# Patient Record
Sex: FEMALE | Race: WHITE | Employment: OTHER | URBAN - METROPOLITAN AREA
[De-identification: names, ages, dates, MRNs, and addresses within clinical notes are randomized per-mention and may not be internally consistent; named-entity substitution may affect disease eponyms.]

---

## 2017-01-12 RX ORDER — ALPRAZOLAM 0.5 MG/1
TABLET ORAL
Qty: 30 TABLET | Refills: 0 | Status: SHIPPED | OUTPATIENT
Start: 2017-01-12 | End: 2017-06-15

## 2017-01-12 NOTE — TELEPHONE ENCOUNTER
ALPRAZOLAM 0.5MG     The patient has an appt on 01/18/17.     Rx is pending for your approval.    Please print & sign, thank you

## 2017-01-18 ENCOUNTER — OFFICE VISIT (OUTPATIENT)
Dept: SURGERY | Facility: CLINIC | Age: 47
End: 2017-01-18

## 2017-01-18 VITALS
SYSTOLIC BLOOD PRESSURE: 98 MMHG | TEMPERATURE: 97 F | RESPIRATION RATE: 16 BRPM | WEIGHT: 122 LBS | DIASTOLIC BLOOD PRESSURE: 72 MMHG | HEART RATE: 64 BPM | BODY MASS INDEX: 20 KG/M2

## 2017-01-18 DIAGNOSIS — D05.11 DUCTAL CARCINOMA IN SITU (DCIS) OF RIGHT BREAST: Primary | ICD-10-CM

## 2017-01-18 DIAGNOSIS — D50.0 IRON DEFICIENCY ANEMIA DUE TO CHRONIC BLOOD LOSS: ICD-10-CM

## 2017-01-18 DIAGNOSIS — R53.83 FATIGUE, UNSPECIFIED TYPE: ICD-10-CM

## 2017-01-18 PROCEDURE — 99213 OFFICE O/P EST LOW 20 MIN: CPT | Performed by: SURGERY

## 2017-01-18 RX ORDER — FLUTICASONE FUROATE AND VILANTEROL TRIFENATATE 100; 25 UG/1; UG/1
POWDER RESPIRATORY (INHALATION)
Refills: 12 | COMMUNITY
Start: 2016-10-27 | End: 2017-01-18

## 2017-01-18 RX ORDER — ELECTROLYTES/DEXTROSE
SOLUTION, ORAL ORAL
COMMUNITY

## 2017-01-18 NOTE — PROGRESS NOTES
Follow Up Visit Note       Active Problems      1. Ductal carcinoma in situ (DCIS) of right breast    2. Iron deficiency anemia due to chronic blood loss    3.  Fatigue, unspecified type          Chief Complaint   Follow - Up    History of Present Illness The family history and social history have been reviewed by me today.     Family History   Problem Relation Age of Onset   • acute venous thrombosis of the deep vessels of the lower extremity[other] [OTHER] Mother    • Cancer Maternal Grandfather stool, abdominal distention and anal bleeding. Genitourinary: Negative for dysuria, urgency, frequency and difficulty urinating. Musculoskeletal: Negative for myalgias and arthralgias. Skin: Negative for color change and rash.    Neurological: Negativ to chronic blood loss  Fatigue, unspecified type    Plan   · Regarding her DCIS, I have asked her to undergo a bilateral mammogram.  I will call her with the results.   · She is interested in pursuing plastic surgery to correct the cosmetic defect of her ri

## 2017-01-25 ENCOUNTER — LAB ENCOUNTER (OUTPATIENT)
Dept: LAB | Facility: HOSPITAL | Age: 47
End: 2017-01-25
Attending: SURGERY
Payer: MEDICAID

## 2017-01-25 ENCOUNTER — HOSPITAL ENCOUNTER (OUTPATIENT)
Dept: MAMMOGRAPHY | Facility: HOSPITAL | Age: 47
Discharge: HOME | End: 2017-01-25
Attending: SURGERY
Payer: MEDICAID

## 2017-01-25 DIAGNOSIS — R53.83 FATIGUE, UNSPECIFIED TYPE: ICD-10-CM

## 2017-01-25 DIAGNOSIS — D05.11 DUCTAL CARCINOMA IN SITU (DCIS) OF RIGHT BREAST: Primary | ICD-10-CM

## 2017-01-25 DIAGNOSIS — D50.0 IRON DEFICIENCY ANEMIA DUE TO CHRONIC BLOOD LOSS: ICD-10-CM

## 2017-01-25 DIAGNOSIS — D05.11 DUCTAL CARCINOMA IN SITU (DCIS) OF RIGHT BREAST: ICD-10-CM

## 2017-01-25 LAB
ALBUMIN SERPL-MCNC: 3.9 G/DL (ref 3.5–4.8)
ALP LIVER SERPL-CCNC: 56 U/L (ref 37–98)
ALT SERPL-CCNC: 26 U/L (ref 14–54)
AST SERPL-CCNC: 22 U/L (ref 15–41)
BASOPHILS # BLD AUTO: 0.03 X10(3) UL (ref 0–0.1)
BASOPHILS NFR BLD AUTO: 0.6 %
BILIRUB SERPL-MCNC: 0.4 MG/DL (ref 0.1–2)
BUN BLD-MCNC: 12 MG/DL (ref 8–20)
CALCIUM BLD-MCNC: 8.6 MG/DL (ref 8.3–10.3)
CHLORIDE: 106 MMOL/L (ref 101–111)
CO2: 25 MMOL/L (ref 22–32)
CREAT BLD-MCNC: 0.64 MG/DL (ref 0.55–1.02)
EOSINOPHIL # BLD AUTO: 0.07 X10(3) UL (ref 0–0.3)
EOSINOPHIL NFR BLD AUTO: 1.5 %
ERYTHROCYTE [DISTWIDTH] IN BLOOD BY AUTOMATED COUNT: 13.2 % (ref 11.5–16)
GLUCOSE BLD-MCNC: 94 MG/DL (ref 70–99)
HCT VFR BLD AUTO: 36.6 % (ref 34–50)
HGB BLD-MCNC: 11.7 G/DL (ref 12–16)
IMMATURE GRANULOCYTE COUNT: 0 X10(3) UL (ref 0–1)
IMMATURE GRANULOCYTE RATIO %: 0 %
LYMPHOCYTES # BLD AUTO: 1.52 X10(3) UL (ref 0.9–4)
LYMPHOCYTES NFR BLD AUTO: 32.7 %
M PROTEIN MFR SERPL ELPH: 6.7 G/DL (ref 6.1–8.3)
MCH RBC QN AUTO: 26.5 PG (ref 27–33.2)
MCHC RBC AUTO-ENTMCNC: 32 G/DL (ref 31–37)
MCV RBC AUTO: 82.8 FL (ref 81–100)
MONOCYTES # BLD AUTO: 0.49 X10(3) UL (ref 0.1–0.6)
MONOCYTES NFR BLD AUTO: 10.5 %
NEUTROPHIL ABS PRELIM: 2.54 X10 (3) UL (ref 1.3–6.7)
NEUTROPHILS # BLD AUTO: 2.54 X10(3) UL (ref 1.3–6.7)
NEUTROPHILS NFR BLD AUTO: 54.7 %
PLATELET # BLD AUTO: 275 10(3)UL (ref 150–450)
POTASSIUM SERPL-SCNC: 3.9 MMOL/L (ref 3.6–5.1)
RBC # BLD AUTO: 4.42 X10(6)UL (ref 3.8–5.1)
RED CELL DISTRIBUTION WIDTH-SD: 40 FL (ref 35.1–46.3)
SODIUM SERPL-SCNC: 139 MMOL/L (ref 136–144)
WBC # BLD AUTO: 4.7 X10(3) UL (ref 4–13)

## 2017-01-25 PROCEDURE — 77062 BREAST TOMOSYNTHESIS BI: CPT

## 2017-01-25 PROCEDURE — 77066 DX MAMMO INCL CAD BI: CPT

## 2017-01-25 PROCEDURE — 85025 COMPLETE CBC W/AUTO DIFF WBC: CPT

## 2017-01-25 PROCEDURE — 80053 COMPREHEN METABOLIC PANEL: CPT

## 2017-01-25 PROCEDURE — 36415 COLL VENOUS BLD VENIPUNCTURE: CPT

## 2017-01-25 NOTE — PROGRESS NOTES
Quick Note:    OK to call patient with result. (normal) No explanation for her fatigue.  She should follow up with her PCP.  ______

## 2017-01-25 NOTE — PROGRESS NOTES
Quick Note:    OK to call patient with result. (normal) She will need a 6 month R diagnostic mammogram and follow up exam. Please put her in the recall system.   ______

## 2017-01-26 DIAGNOSIS — D05.11 DUCTAL CARCINOMA IN SITU (DCIS) OF RIGHT BREAST: Primary | ICD-10-CM

## 2017-01-26 NOTE — PROGRESS NOTES
Quick Note:    Notified  of test results and recall in 6 months. Order for mammogram and recall put in system.   ______

## 2017-01-26 NOTE — PROGRESS NOTES
Quick Note:    Notified  of test results. Advised to follow up with PCP.   voiced understanding.  ______

## 2017-02-14 ENCOUNTER — OFFICE VISIT (OUTPATIENT)
Dept: SURGERY | Facility: CLINIC | Age: 47
End: 2017-02-14

## 2017-02-14 VITALS
SYSTOLIC BLOOD PRESSURE: 105 MMHG | BODY MASS INDEX: 20.26 KG/M2 | HEART RATE: 90 BPM | TEMPERATURE: 99 F | HEIGHT: 65 IN | DIASTOLIC BLOOD PRESSURE: 66 MMHG | WEIGHT: 121.63 LBS

## 2017-02-14 DIAGNOSIS — D05.11 DUCTAL CARCINOMA IN SITU (DCIS) OF RIGHT BREAST: Primary | ICD-10-CM

## 2017-02-14 PROCEDURE — 99202 OFFICE O/P NEW SF 15 MIN: CPT | Performed by: SURGERY

## 2017-02-14 NOTE — CONSULTS
New Patient Consultation    This is the first visit for this 42year old who presents for evaluation of breast deformity following lumpectomy. History of Present Illness:    The patient is a 42year old referred by Dr. Lorenzo Robert for evaluation of her breast Vitamins-Minerals (MULTIVITAMIN ADULT) Oral Tab Take by mouth.  Disp:  Rfl:    ALPRAZOLAM 0.5 MG Oral Tab TAKE 1 TABLET BY MOUTH DAILY AS NEEDED, Disp: 30 tablet Rfl: 0   Venlafaxine HCl ER 75 MG Oral Capsule SR 24 Hr Take 1 capsule (75 mg total) by mouth d vomiting, dark/ bloody stools, diarrhea, constipation,  change in bowel habits, or abdominal pain.      Genitourinary:  The patient denies frequent urination, needing to get up at night to urinate, urinary hesitancy or retaining urine, painful urination, ur fold scar. Mild descent of the left breast is noted. A mild waterfall deformity is noted. There are no palpable left breast masses. There is no left axillary lymphadenopathy.   Left breast measurements are sternal notch nipple of 22 cm, base diameter of

## 2017-02-28 ENCOUNTER — OFFICE VISIT (OUTPATIENT)
Dept: SURGERY | Facility: CLINIC | Age: 47
End: 2017-02-28

## 2017-02-28 VITALS
HEIGHT: 65 IN | WEIGHT: 121.63 LBS | DIASTOLIC BLOOD PRESSURE: 73 MMHG | HEART RATE: 91 BPM | TEMPERATURE: 99 F | SYSTOLIC BLOOD PRESSURE: 105 MMHG | BODY MASS INDEX: 20.26 KG/M2

## 2017-02-28 DIAGNOSIS — D05.11 DUCTAL CARCINOMA IN SITU (DCIS) OF RIGHT BREAST: Primary | ICD-10-CM

## 2017-02-28 PROCEDURE — 99214 OFFICE O/P EST MOD 30 MIN: CPT | Performed by: SURGERY

## 2017-02-28 NOTE — PROGRESS NOTES
Carly Banks is a 42year old female who presents today for a follow-up. Since her last visit, the patient's old medical records were reviewed.   They revealed that subpectoral breast augmentation was performed with smooth round moderate plus profile sa Representative illustrations of photographs are demonstrated. Tissue expanders were demonstrated to the patient. We discussed the process has been office expansion as well as need for drains, activity limitation, and compression.   We discussed the need f

## 2017-03-21 ENCOUNTER — TELEPHONE (OUTPATIENT)
Dept: FAMILY MEDICINE CLINIC | Facility: CLINIC | Age: 47
End: 2017-03-21

## 2017-03-21 DIAGNOSIS — D05.11 DUCTAL CARCINOMA IN SITU (DCIS) OF RIGHT BREAST: Primary | ICD-10-CM

## 2017-03-21 NOTE — TELEPHONE ENCOUNTER
Message received from [facility] today: Please see message below and advise if okay to refer.     Reason for the order/referral:   PCP:  Dr Alex Donahue   Refer to Provider (first and last name):Dionisio Carty   Specialty: Surgeon   Patient I

## 2017-04-10 ENCOUNTER — OFFICE VISIT (OUTPATIENT)
Dept: SURGERY | Facility: CLINIC | Age: 47
End: 2017-04-10

## 2017-04-10 VITALS — HEIGHT: 64 IN | BODY MASS INDEX: 19.29 KG/M2 | WEIGHT: 113 LBS

## 2017-04-10 DIAGNOSIS — D05.11 DUCTAL CARCINOMA IN SITU (DCIS) OF RIGHT BREAST: Primary | ICD-10-CM

## 2017-04-10 DIAGNOSIS — Z85.3 HISTORY OF BREAST CANCER: ICD-10-CM

## 2017-04-10 PROCEDURE — 99212 OFFICE O/P EST SF 10 MIN: CPT | Performed by: SURGERY

## 2017-04-10 PROCEDURE — 99245 OFF/OP CONSLTJ NEW/EST HI 55: CPT | Performed by: SURGERY

## 2017-04-11 NOTE — H&P
History and Physical      Lexy Wilks is a 42year old female. HPI   Patient presents with:  Breast Follow-up: Pt states here for 2nd opinion. Pt states she had right breast lumpectomy and cont. to have 3 month f/u w/ Dr. Francia Girard.   Pt does SBE and • Ductal carcinoma in situ of breast 2015   • Depression        SURGICAL HISTORY      Past Surgical History    APPENDECTOMY  1989    ENLARGE BREAST WITH IMPLANT  12/04/06    D & C  2009    Comment SAB    BIOPSY OF BREAST, NEEDLE CORE Right 7/8/15    RPEM of systems was completed. Pertinent positives and negatives noted in the the HPI. PHYSICAL EXAM   Body mass index is 19.39 kg/(m^2). Patient's last menstrual period was 04/03/2017.   Constitutional: appears well hydrated alert and responsive no acu 18% 5 year and 27% 10 year rates by [facility] nomogram, [employer], 2010). I encouraged her to revisit this discussion with medical and radiation oncology specialists.   Further reconstructive surgery does offer another opportunity to r/o persistent or recurrent dz,

## 2017-04-21 ENCOUNTER — TELEPHONE (OUTPATIENT)
Dept: SURGERY | Facility: CLINIC | Age: 47
End: 2017-04-21

## 2017-04-21 NOTE — TELEPHONE ENCOUNTER
States orders for MRI was written wrong. Order state its a MRI w/ IV, should states MRI Bilateral w and w/o contrast. Please advise. Thank you.

## 2017-04-28 ENCOUNTER — TELEPHONE (OUTPATIENT)
Dept: SURGERY | Facility: CLINIC | Age: 47
End: 2017-04-28

## 2017-04-28 NOTE — TELEPHONE ENCOUNTER
[address] [facility] Ins[employer] Verifier calling to inform that prior Terryann Rinks is needed for MRI Breast sched on Mon 5/1. [facility].

## 2017-04-28 NOTE — TELEPHONE ENCOUNTER
Started request for prior authorization for MRI breast scheduled 05/01/2017 via [facility]. Tracking number [id number]. Request was forwarded to clinical review for further evaluation, awaiting further notification/instruction from Jemal Shore  In

## 2017-05-02 NOTE — TELEPHONE ENCOUNTER
Received authorization number via fax from [employer]. Authorization for Breast MRI is [id number] from 04/28/2017 to 05/28/2017. Documents placed in bin to scan, also LM to inform Haylee in insurance verification.

## 2017-05-23 ENCOUNTER — HOSPITAL ENCOUNTER (OUTPATIENT)
Dept: MRI IMAGING | Facility: HOSPITAL | Age: 47
Discharge: HOME | End: 2017-05-23
Attending: SURGERY
Payer: MEDICAID

## 2017-05-23 DIAGNOSIS — D05.11 DUCTAL CARCINOMA IN SITU (DCIS) OF RIGHT BREAST: ICD-10-CM

## 2017-05-23 PROCEDURE — 0159T MRI BREAST (W+WO) W/CAD BILAT (CPT=77059/0159T): CPT | Performed by: SURGERY

## 2017-05-23 PROCEDURE — A9575 INJ GADOTERATE MEGLUMI 0.1ML: HCPCS | Performed by: SURGERY

## 2017-05-23 PROCEDURE — 82565 ASSAY OF CREATININE: CPT

## 2017-05-23 PROCEDURE — 77059 MRI BREAST (W+WO) W/CAD BILAT (CPT=77059/0159T): CPT | Performed by: SURGERY

## 2017-05-31 ENCOUNTER — TELEPHONE (OUTPATIENT)
Dept: SURGERY | Facility: CLINIC | Age: 47
End: 2017-05-31

## 2017-05-31 NOTE — TELEPHONE ENCOUNTER
Contacted patient. Dr. Pramod Zaidi will review results tomorrow, we will call patient back tomorrow 06/01/2017 after results are reviewed with results and his recommendations. Patient verbalized understanding and denied further complaints or concerns at this time.

## 2017-06-01 NOTE — TELEPHONE ENCOUNTER
Per Dr. Ivan Berry, he needs to discuss results with the plastic surgeon. Will hopefully have results and recommendations ready tomorrow, 06/02/2017. Contacted patient and notified her of above. She verbalized understanding.

## 2017-06-02 NOTE — TELEPHONE ENCOUNTER
Per Dr. Cary Baltazar, no signs of cancer or precancer on MRI breasts, however Dr. Cary Baltazar did discuss patient's case with Dr. Leroy Sullivan plastic surgeon and stressed importance of patient coming in for follow up office visit next week.  Appt scheduled 06/06/2017 at 12:45 PM

## 2017-06-06 ENCOUNTER — OFFICE VISIT (OUTPATIENT)
Dept: SURGERY | Facility: CLINIC | Age: 47
End: 2017-06-06

## 2017-06-06 DIAGNOSIS — Z86.000 HISTORY OF DUCTAL CARCINOMA IN SITU (DCIS) OF BREAST: Primary | ICD-10-CM

## 2017-06-06 PROCEDURE — 99214 OFFICE O/P EST MOD 30 MIN: CPT | Performed by: SURGERY

## 2017-06-06 PROCEDURE — 99212 OFFICE O/P EST SF 10 MIN: CPT | Performed by: SURGERY

## 2017-06-07 ENCOUNTER — TELEPHONE (OUTPATIENT)
Dept: SURGERY | Facility: CLINIC | Age: 47
End: 2017-06-07

## 2017-06-07 NOTE — TELEPHONE ENCOUNTER
[facility] requesting to speak to ELIZABETH Alanis, would like to know if pt has been scheduled for sx, also has other questions. Pls call thank you.

## 2017-06-07 NOTE — TELEPHONE ENCOUNTER
Danielle Blakely' office that pt was seen yesterday by Dr. Jhoanna Ramesh but not schedule for surgery.

## 2017-06-08 NOTE — PROGRESS NOTES
Established Patient Follow-up      6/7/2017    Brianna Logan 41year old      HPI  Patient presents with:  Breast Follow-up: Pt here to discuss MRI results. Pt denies any new findings.     No new breast complaints, interested in moving ahead with breast r

## 2017-06-12 ENCOUNTER — TELEPHONE (OUTPATIENT)
Dept: FAMILY MEDICINE CLINIC | Facility: CLINIC | Age: 47
End: 2017-06-12

## 2017-06-12 NOTE — TELEPHONE ENCOUNTER
Patient called regarding scheduling their pre-op exam.  Advised patient to call their surgeon office and request that the information regarding their surgery (and any required testing)  be faxed to our office at [phone].  Advised patient that our offi

## 2017-06-13 ENCOUNTER — TELEPHONE (OUTPATIENT)
Dept: FAMILY MEDICINE CLINIC | Facility: CLINIC | Age: 47
End: 2017-06-13

## 2017-06-13 DIAGNOSIS — Z01.812 PRE-OPERATIVE LABORATORY EXAMINATION: Primary | ICD-10-CM

## 2017-06-13 NOTE — TELEPHONE ENCOUNTER
Paperwork received from [facility] Dr Shonda Rosado [address], [address] ,  [place] [phone].   Pt needs to get CBC, BMP, PT/PTT and EKG ( if has cardiac history or on dialysis or over 6

## 2017-06-13 NOTE — TELEPHONE ENCOUNTER
I can see  her Thursday this week at 11: 15 for 30 minutes. Please open my schedule for that appointment. Thank you she can get a blood work before that visit. We will decide about EKG when she is here in the office. Thanks.

## 2017-06-15 ENCOUNTER — LAB ENCOUNTER (OUTPATIENT)
Dept: LAB | Age: 47
End: 2017-06-15
Attending: FAMILY MEDICINE
Payer: MEDICAID

## 2017-06-15 DIAGNOSIS — Z01.812 PRE-OPERATIVE LABORATORY EXAMINATION: ICD-10-CM

## 2017-06-15 PROCEDURE — 36415 COLL VENOUS BLD VENIPUNCTURE: CPT | Performed by: FAMILY MEDICINE

## 2017-06-15 PROCEDURE — 85025 COMPLETE CBC W/AUTO DIFF WBC: CPT | Performed by: FAMILY MEDICINE

## 2017-06-15 PROCEDURE — 85610 PROTHROMBIN TIME: CPT | Performed by: FAMILY MEDICINE

## 2017-06-15 PROCEDURE — 80048 BASIC METABOLIC PNL TOTAL CA: CPT | Performed by: FAMILY MEDICINE

## 2017-06-15 PROCEDURE — 85730 THROMBOPLASTIN TIME PARTIAL: CPT | Performed by: FAMILY MEDICINE

## 2017-06-15 NOTE — PROGRESS NOTES
Teresa Duarte is a 41year old female who presented initially for a pre-operative physical exam. Patient was preparing to  have breast reconstructive surgery on June 20, 2017 by Dr. Cat Medley at  [facility].    During prep evaluation yamilet infection due to Clostridium difficile    • Multinodular goiter    • Vaginitis and vulvovaginitis, unspecified       Past Surgical History:  1989: APPENDECTOMY  7/8/15: BIOPSY OF BREAST, NEEDLE CORE Right  7/31/15: BREAST LUMPECTOMY Right      Comment:  murmur  GI: good BS's,no masses, HSM or tenderness  : deferred  RECTAL: deffered  MUSCULOSKELETAL: back is not tender,FROM of the back  EXTREMITIES: no cyanosis, clubbing or edema  NEURO: Oriented times three,cranial nerves are intact,motor and sensory a

## 2017-06-15 NOTE — PATIENT INSTRUCTIONS
Continue current meds. Keep good hydration. DO NOT take Aspirin, Advil, Ibuprofen, Aleve , Motrin for 1 week prior surgery. Take Tylenol  as needed for pain.

## 2017-06-19 ENCOUNTER — OFFICE VISIT (OUTPATIENT)
Dept: HEMATOLOGY/ONCOLOGY | Facility: HOSPITAL | Age: 47
End: 2017-06-19
Attending: INTERNAL MEDICINE
Payer: MEDICAID

## 2017-06-19 ENCOUNTER — TELEPHONE (OUTPATIENT)
Dept: FAMILY MEDICINE CLINIC | Facility: CLINIC | Age: 47
End: 2017-06-19

## 2017-06-19 VITALS
SYSTOLIC BLOOD PRESSURE: 102 MMHG | TEMPERATURE: 99 F | BODY MASS INDEX: 20.49 KG/M2 | RESPIRATION RATE: 18 BRPM | WEIGHT: 120 LBS | OXYGEN SATURATION: 100 % | HEART RATE: 83 BPM | HEIGHT: 64 IN | DIASTOLIC BLOOD PRESSURE: 65 MMHG

## 2017-06-19 DIAGNOSIS — D72.819 CHRONIC LEUKOPENIA: ICD-10-CM

## 2017-06-19 DIAGNOSIS — D50.0 IRON DEFICIENCY ANEMIA DUE TO CHRONIC BLOOD LOSS: ICD-10-CM

## 2017-06-19 DIAGNOSIS — D64.9 NORMOCYTIC ANEMIA: ICD-10-CM

## 2017-06-19 DIAGNOSIS — D05.11 DUCTAL CARCINOMA IN SITU (DCIS) OF RIGHT BREAST: Primary | ICD-10-CM

## 2017-06-19 DIAGNOSIS — D70.9 CHRONIC NEUTROPENIA (HCC): ICD-10-CM

## 2017-06-19 PROCEDURE — 99214 OFFICE O/P EST MOD 30 MIN: CPT | Performed by: INTERNAL MEDICINE

## 2017-06-19 NOTE — TELEPHONE ENCOUNTER
Nurse from Dr. Chris Pena office called. They were wanting clarification from the cover letter that was faxed to their office on 06/16/2017. I advised them the pt.s surgery was cancelled due to her low  WBC count and her being Neutropenic.  Dr. Re Aguirre

## 2017-06-19 NOTE — TELEPHONE ENCOUNTER
Patient aware of surgery cancellation due to abnormal labs per Dr. Valeri Ruano, [facility] notified.

## 2017-06-19 NOTE — TELEPHONE ENCOUNTER
Eliud Rene from Dr. Chris Pena' office called ([phone]). She is asking if our office notified pt that his surgery is cancelled tomorrow. Pls advise Kelly.

## 2017-06-19 NOTE — PROGRESS NOTES
Cancer Center Progress Note    Problem List:      Patient Active Problem List:     Dysmenorrhea     Chest pain, unspecified     Insomnia, unspecified     Acute pharyngitis     Lumbago     Headache(784.0)     Leukocytopenia, unspecified     Umbilical hernia ADULT) Oral Tab Take by mouth. Disp:  Rfl:        Performance Status:  ECOG 0: Fully active, able to carry on all pre-disease performance without restriction     Physical Examination:    General: Patient is alert and oriented x 3, not in acute distress.   V her iron stores. Transient neutropenia with folate deficiency:    Her repeat WBC is normal. She canela tart folate 1 mg daily. We will have her repeat her labs in three months. She can proceed with her plastic surgery.  There is no hematologic contrain

## 2017-06-20 RX ORDER — FOLIC ACID 1 MG/1
1 TABLET ORAL DAILY
Qty: 30 TABLET | Refills: 11 | Status: SHIPPED | OUTPATIENT
Start: 2017-06-20 | End: 2017-09-27 | Stop reason: ALTCHOICE

## 2017-06-21 ENCOUNTER — TELEPHONE (OUTPATIENT)
Dept: HEMATOLOGY/ONCOLOGY | Facility: HOSPITAL | Age: 47
End: 2017-06-21

## 2017-06-26 ENCOUNTER — APPOINTMENT (OUTPATIENT)
Dept: HEMATOLOGY/ONCOLOGY | Facility: HOSPITAL | Age: 47
End: 2017-06-26
Attending: INTERNAL MEDICINE
Payer: MEDICAID

## 2017-06-27 ENCOUNTER — OFFICE VISIT (OUTPATIENT)
Dept: HEMATOLOGY/ONCOLOGY | Facility: HOSPITAL | Age: 47
End: 2017-06-27
Attending: INTERNAL MEDICINE
Payer: MEDICAID

## 2017-06-27 VITALS
RESPIRATION RATE: 16 BRPM | OXYGEN SATURATION: 99 % | BODY MASS INDEX: 20 KG/M2 | SYSTOLIC BLOOD PRESSURE: 96 MMHG | HEART RATE: 83 BPM | WEIGHT: 119 LBS | DIASTOLIC BLOOD PRESSURE: 64 MMHG | TEMPERATURE: 99 F

## 2017-06-27 DIAGNOSIS — D50.0 IRON DEFICIENCY ANEMIA DUE TO CHRONIC BLOOD LOSS: Primary | ICD-10-CM

## 2017-06-27 PROCEDURE — 96365 THER/PROPH/DIAG IV INF INIT: CPT

## 2017-06-27 RX ORDER — DIPHENHYDRAMINE HYDROCHLORIDE 50 MG/ML
25 INJECTION INTRAMUSCULAR; INTRAVENOUS ONCE
Status: CANCELLED | OUTPATIENT
Start: 2017-06-27

## 2017-06-27 NOTE — PROGRESS NOTES
Per Parvez piedra/ Dr. Hamilton Payor, patient to take Folic Acid daily as ordered and return in 3 months for repeat labs. Patient confirms taking Folic Acid.     Education Record    Learner:  Patient    Disease / Diagnosis:    Barriers / Limitations:  None   Comments:

## 2017-06-29 PROBLEM — D70.9 NEUTROPENIA: Status: ACTIVE | Noted: 2017-06-29

## 2017-06-29 PROBLEM — D70.9 NEUTROPENIA (HCC): Status: ACTIVE | Noted: 2017-06-29

## 2017-08-03 ENCOUNTER — OFFICE VISIT (OUTPATIENT)
Dept: FAMILY MEDICINE CLINIC | Facility: CLINIC | Age: 47
End: 2017-08-03

## 2017-08-03 VITALS
SYSTOLIC BLOOD PRESSURE: 100 MMHG | RESPIRATION RATE: 16 BRPM | HEIGHT: 63.5 IN | WEIGHT: 117.25 LBS | TEMPERATURE: 98 F | DIASTOLIC BLOOD PRESSURE: 60 MMHG | OXYGEN SATURATION: 99 % | HEART RATE: 78 BPM | BODY MASS INDEX: 20.52 KG/M2

## 2017-08-03 DIAGNOSIS — D72.819 LEUKOPENIA, UNSPECIFIED TYPE: Primary | ICD-10-CM

## 2017-08-03 DIAGNOSIS — D70.9 NEUTROPENIA, UNSPECIFIED TYPE (HCC): ICD-10-CM

## 2017-08-03 DIAGNOSIS — R89.9 ABNORMAL LABORATORY TEST RESULT: ICD-10-CM

## 2017-08-03 DIAGNOSIS — D64.9 NORMOCYTIC ANEMIA: ICD-10-CM

## 2017-08-03 PROCEDURE — 99214 OFFICE O/P EST MOD 30 MIN: CPT | Performed by: FAMILY MEDICINE

## 2017-08-03 NOTE — PATIENT INSTRUCTIONS
Schedule follow-up with pulmonologist.  Follow-up with Dr. Vicky Lopez in 2 months. Keep good hydration. Healthy diet.

## 2017-08-03 NOTE — PROGRESS NOTES
Zuleika Rogel is a 41year old female. cc leukopenia/neutropenia, discuss test results. HPI:   Patient is coming to discuss test results she had done the night of the 20th. She had positive IgG antibodies to cytomegalovirus and Ryan-Barr virus.   IgM a Smokeless tobacco: Never Used                      Alcohol use: Yes           0.0 oz/week       REVIEW OF SYSTEMS:   GENERAL HEALTH: feels well otherwise  SKIN: denies any unusual skin lesions or rashes  HEENT no congestion no runny nose no sore throat Time spent was 30 min, more than 50% was spent on counseling regarding medical conditions and treatment.

## 2017-09-19 ENCOUNTER — NURSE ONLY (OUTPATIENT)
Dept: HEMATOLOGY/ONCOLOGY | Facility: HOSPITAL | Age: 47
End: 2017-09-19
Attending: INTERNAL MEDICINE
Payer: COMMERCIAL

## 2017-09-19 DIAGNOSIS — D72.819 CHRONIC LEUKOPENIA: ICD-10-CM

## 2017-09-19 DIAGNOSIS — D05.11 DUCTAL CARCINOMA IN SITU (DCIS) OF RIGHT BREAST: ICD-10-CM

## 2017-09-19 DIAGNOSIS — D64.9 NORMOCYTIC ANEMIA: ICD-10-CM

## 2017-09-19 DIAGNOSIS — D50.0 IRON DEFICIENCY ANEMIA DUE TO CHRONIC BLOOD LOSS: ICD-10-CM

## 2017-09-19 DIAGNOSIS — D70.9 CHRONIC NEUTROPENIA (HCC): ICD-10-CM

## 2017-09-19 LAB
BASOPHILS # BLD AUTO: 0.04 X10(3) UL (ref 0–0.1)
BASOPHILS NFR BLD AUTO: 0.9 %
DEPRECATED HBV CORE AB SER IA-ACNC: 113.7 NG/ML (ref 10–291)
EOSINOPHIL # BLD AUTO: 0.1 X10(3) UL (ref 0–0.3)
EOSINOPHIL NFR BLD AUTO: 2.3 %
ERYTHROCYTE [DISTWIDTH] IN BLOOD BY AUTOMATED COUNT: 13.5 % (ref 11.5–16)
HAV AB SERPL IA-ACNC: 346 PG/ML (ref 193–986)
HCT VFR BLD AUTO: 41.4 % (ref 34–50)
HGB BLD-MCNC: 13.9 G/DL (ref 12–16)
IMMATURE GRANULOCYTE COUNT: 0 X10(3) UL (ref 0–1)
IMMATURE GRANULOCYTE RATIO %: 0 %
LYMPHOCYTES # BLD AUTO: 1.54 X10(3) UL (ref 0.9–4)
LYMPHOCYTES NFR BLD AUTO: 35.6 %
MCH RBC QN AUTO: 28.5 PG (ref 27–33.2)
MCHC RBC AUTO-ENTMCNC: 33.6 G/DL (ref 31–37)
MCV RBC AUTO: 84.8 FL (ref 81–100)
MONOCYTES # BLD AUTO: 0.37 X10(3) UL (ref 0.1–0.6)
MONOCYTES NFR BLD AUTO: 8.6 %
NEUTROPHIL ABS PRELIM: 2.27 X10 (3) UL (ref 1.3–6.7)
NEUTROPHILS # BLD AUTO: 2.27 X10(3) UL (ref 1.3–6.7)
NEUTROPHILS NFR BLD AUTO: 52.6 %
PLATELET # BLD AUTO: 271 10(3)UL (ref 150–450)
RBC # BLD AUTO: 4.88 X10(6)UL (ref 3.8–5.1)
RED CELL DISTRIBUTION WIDTH-SD: 42 FL (ref 35.1–46.3)
WBC # BLD AUTO: 4.3 X10(3) UL (ref 4–13)

## 2017-09-19 PROCEDURE — 85025 COMPLETE CBC W/AUTO DIFF WBC: CPT

## 2017-09-19 PROCEDURE — 82728 ASSAY OF FERRITIN: CPT

## 2017-09-19 PROCEDURE — 82607 VITAMIN B-12: CPT

## 2017-09-19 PROCEDURE — 36415 COLL VENOUS BLD VENIPUNCTURE: CPT

## 2017-09-22 ENCOUNTER — TELEPHONE (OUTPATIENT)
Dept: HEMATOLOGY/ONCOLOGY | Facility: HOSPITAL | Age: 47
End: 2017-09-22

## 2017-09-22 NOTE — TELEPHONE ENCOUNTER
Pt calling for lab results from [phone]. Lab results WNL. Pt verbalized understanding.

## 2017-09-25 ENCOUNTER — APPOINTMENT (OUTPATIENT)
Dept: HEMATOLOGY/ONCOLOGY | Facility: HOSPITAL | Age: 47
End: 2017-09-25
Attending: INTERNAL MEDICINE
Payer: COMMERCIAL

## 2017-09-27 ENCOUNTER — OFFICE VISIT (OUTPATIENT)
Dept: HEMATOLOGY/ONCOLOGY | Facility: HOSPITAL | Age: 47
End: 2017-09-27
Attending: INTERNAL MEDICINE
Payer: COMMERCIAL

## 2017-09-27 VITALS
HEIGHT: 64.02 IN | SYSTOLIC BLOOD PRESSURE: 108 MMHG | HEART RATE: 76 BPM | WEIGHT: 120.5 LBS | DIASTOLIC BLOOD PRESSURE: 63 MMHG | BODY MASS INDEX: 20.57 KG/M2 | OXYGEN SATURATION: 99 % | TEMPERATURE: 98 F | RESPIRATION RATE: 18 BRPM

## 2017-09-27 DIAGNOSIS — D05.11 DUCTAL CARCINOMA IN SITU (DCIS) OF RIGHT BREAST: Primary | ICD-10-CM

## 2017-09-27 NOTE — PROGRESS NOTES
Cancer Center Progress Note    Problem List:      Patient Active Problem List:     Dysmenorrhea     Chest pain, unspecified     Insomnia, unspecified     Acute pharyngitis     Lumbago     Headache(784.0)     Leukocytopenia     Umbilical hernia without ment 30 tablet Rfl: 1   Multiple Vitamins-Minerals (MULTIVITAMIN ADULT) Oral Tab Take by mouth.  Disp:  Rfl:        Performance Status:  ECOG 0: Fully active, able to carry on all pre-disease performance without restriction     Physical Examination:    General: due to constipation and GI upset. I will give her a dose of Infed to replace her iron stores. Transient neutropenia with folate deficiency:    Her repeat WBC is normal. She canela tart folate 1 mg daily. We will have her repeat her labs in three months.

## 2017-11-13 ENCOUNTER — TELEPHONE (OUTPATIENT)
Dept: FAMILY MEDICINE CLINIC | Facility: CLINIC | Age: 47
End: 2017-11-13

## 2017-11-13 NOTE — TELEPHONE ENCOUNTER
Low back pain x 1 month hurst when walking and urinating x 1 month. Pt has been having symptoms more often than at the start. Tried taking ibuprofen, did not help. In pelvis right sided pain, low back is bilateral pain. Increased urgency for urination.

## 2017-11-13 NOTE — TELEPHONE ENCOUNTER
Pt calling saying she has been having lower back pain, right side, for about a month; hurts when she urinating and walking. Call transferred to triage.

## 2017-11-13 NOTE — TELEPHONE ENCOUNTER
Patient has [employer]   I am not sure if she is still having the same insurance. If she is [employer]  we will not be able to see her here. I can see at 8:45 AM on Thursday. If there is other openings we can call her to move her up sooner.

## 2017-11-13 NOTE — TELEPHONE ENCOUNTER
Asked pt if she is still covered by [facility], she stated that she is now covered by [facility]

## 2017-11-16 ENCOUNTER — TELEPHONE (OUTPATIENT)
Dept: FAMILY MEDICINE CLINIC | Facility: CLINIC | Age: 47
End: 2017-11-16

## 2017-11-16 ENCOUNTER — OFFICE VISIT (OUTPATIENT)
Dept: FAMILY MEDICINE CLINIC | Facility: CLINIC | Age: 47
End: 2017-11-16

## 2017-11-16 ENCOUNTER — HOSPITAL ENCOUNTER (OUTPATIENT)
Dept: CT IMAGING | Age: 47
Discharge: HOME | End: 2017-11-16
Attending: FAMILY MEDICINE
Payer: MEDICAID

## 2017-11-16 ENCOUNTER — LAB ENCOUNTER (OUTPATIENT)
Dept: LAB | Age: 47
End: 2017-11-16
Attending: FAMILY MEDICINE
Payer: MEDICAID

## 2017-11-16 VITALS
SYSTOLIC BLOOD PRESSURE: 90 MMHG | HEIGHT: 64 IN | RESPIRATION RATE: 16 BRPM | TEMPERATURE: 97 F | BODY MASS INDEX: 20.49 KG/M2 | HEART RATE: 74 BPM | WEIGHT: 120 LBS | DIASTOLIC BLOOD PRESSURE: 48 MMHG

## 2017-11-16 DIAGNOSIS — E55.9 VITAMIN D DEFICIENCY: ICD-10-CM

## 2017-11-16 DIAGNOSIS — R35.0 URINE FREQUENCY: ICD-10-CM

## 2017-11-16 DIAGNOSIS — R10.30 LOWER ABDOMINAL PAIN: Primary | ICD-10-CM

## 2017-11-16 DIAGNOSIS — R10.30 LOWER ABDOMINAL PAIN: ICD-10-CM

## 2017-11-16 DIAGNOSIS — K59.00 CONSTIPATION, UNSPECIFIED CONSTIPATION TYPE: ICD-10-CM

## 2017-11-16 DIAGNOSIS — D25.9 UTERINE LEIOMYOMA, UNSPECIFIED LOCATION: ICD-10-CM

## 2017-11-16 PROCEDURE — 80053 COMPREHEN METABOLIC PANEL: CPT | Performed by: FAMILY MEDICINE

## 2017-11-16 PROCEDURE — 82306 VITAMIN D 25 HYDROXY: CPT | Performed by: FAMILY MEDICINE

## 2017-11-16 PROCEDURE — 87086 URINE CULTURE/COLONY COUNT: CPT | Performed by: FAMILY MEDICINE

## 2017-11-16 PROCEDURE — 83690 ASSAY OF LIPASE: CPT | Performed by: FAMILY MEDICINE

## 2017-11-16 PROCEDURE — 74177 CT ABD & PELVIS W/CONTRAST: CPT | Performed by: FAMILY MEDICINE

## 2017-11-16 PROCEDURE — 99214 OFFICE O/P EST MOD 30 MIN: CPT | Performed by: FAMILY MEDICINE

## 2017-11-16 PROCEDURE — 81025 URINE PREGNANCY TEST: CPT | Performed by: FAMILY MEDICINE

## 2017-11-16 PROCEDURE — 81003 URINALYSIS AUTO W/O SCOPE: CPT | Performed by: FAMILY MEDICINE

## 2017-11-16 PROCEDURE — 36415 COLL VENOUS BLD VENIPUNCTURE: CPT | Performed by: FAMILY MEDICINE

## 2017-11-16 NOTE — PROGRESS NOTES
Teresa Duarte is a 41year old female. cc low back pain, low abd pain. HPI:   Patient complains of having lower abdominal pain and back pain. started  last month. The pain is in the lower back slowly moving to the front.   It is fluctuating between 4 an pain and denies heartburn  NEURO: denies headaches musculoskeletal lower back pain seems to be inside, no weakness of extremities  Psych normal mood.       EXAM:   BP 90/48 (BP Location: Right arm, Patient Position: Sitting, Cuff Size: adult)   Pulse 74   T location  Constipation, unspecified constipation type      Orders Placed This Encounter      Urine Dip, auto without Micro      CBC W DIFFERENTIAL W PLATELET      COMP METABOLIC PANEL      Lipase [E]      VITAMIN D, 25-HYDROXY      Urine Preg Test      URI cortex that is stable and most consistent with a small cyst. No additional renal lesions. ADRENALS:  No mass or enlargement. AORTA/VASCULAR:  No aneurysm or dissection. RETROPERITONEUM:  No mass or adenopathy.     BOWEL/MESENTERY:  Stomach, duodenal weeks.

## 2017-11-16 NOTE — TELEPHONE ENCOUNTER
Msg received from [facility]. [id number] needed for CT of Abdomen    Good Morning,         CPT 44690 is pending  can do a Peer to Peer by calling  .                Naeem Ceballos

## 2017-11-19 ENCOUNTER — HOSPITAL ENCOUNTER (OUTPATIENT)
Dept: ULTRASOUND IMAGING | Age: 47
Discharge: HOME | End: 2017-11-19
Attending: FAMILY MEDICINE
Payer: MEDICAID

## 2017-11-19 DIAGNOSIS — R10.30 LOWER ABDOMINAL PAIN: ICD-10-CM

## 2017-11-19 PROCEDURE — 76830 TRANSVAGINAL US NON-OB: CPT | Performed by: FAMILY MEDICINE

## 2017-11-19 PROCEDURE — 76856 US EXAM PELVIC COMPLETE: CPT | Performed by: FAMILY MEDICINE

## 2017-11-19 PROCEDURE — 93975 VASCULAR STUDY: CPT | Performed by: FAMILY MEDICINE

## 2017-11-21 ENCOUNTER — TELEPHONE (OUTPATIENT)
Dept: FAMILY MEDICINE CLINIC | Facility: CLINIC | Age: 47
End: 2017-11-21

## 2017-11-21 RX ORDER — DICYCLOMINE HYDROCHLORIDE 10 MG/1
CAPSULE ORAL
Qty: 30 CAPSULE | Refills: 0 | Status: SHIPPED | OUTPATIENT
Start: 2017-11-21 | End: 2017-12-13 | Stop reason: ALTCHOICE

## 2017-11-21 NOTE — TELEPHONE ENCOUNTER
Ultrasound shows uterine fibroids. Was unremarkable. She is seeing OB/GYN and recheck on the fibroids could be done by them.

## 2017-11-21 NOTE — TELEPHONE ENCOUNTER
We could try Bentyl 10 mg 1-2 tablets 3 times per day and at bedtime as needed this is to help with crampy abdominal pain. #30 tablets.

## 2017-11-21 NOTE — TELEPHONE ENCOUNTER
Left msg to pt VM   Pt  (Tena/rachel) called and made aware of provider's response/recommendation    How about pain control?    reported pt taking Tylenol for pain but with no relief     Pls advise  Thank you

## 2017-12-13 ENCOUNTER — OFFICE VISIT (OUTPATIENT)
Dept: FAMILY MEDICINE CLINIC | Facility: CLINIC | Age: 47
End: 2017-12-13

## 2017-12-13 ENCOUNTER — HOSPITAL ENCOUNTER (OUTPATIENT)
Dept: GENERAL RADIOLOGY | Age: 47
Discharge: HOME | End: 2017-12-13
Attending: FAMILY MEDICINE
Payer: MEDICAID

## 2017-12-13 VITALS
HEIGHT: 63.5 IN | WEIGHT: 121 LBS | HEART RATE: 87 BPM | OXYGEN SATURATION: 97 % | TEMPERATURE: 98 F | DIASTOLIC BLOOD PRESSURE: 52 MMHG | BODY MASS INDEX: 21.17 KG/M2 | SYSTOLIC BLOOD PRESSURE: 96 MMHG | RESPIRATION RATE: 16 BRPM

## 2017-12-13 DIAGNOSIS — R06.2 WHEEZES: ICD-10-CM

## 2017-12-13 DIAGNOSIS — R05.9 COUGH: ICD-10-CM

## 2017-12-13 DIAGNOSIS — R09.89 CHEST CONGESTION: ICD-10-CM

## 2017-12-13 DIAGNOSIS — R05.9 COUGH: Primary | ICD-10-CM

## 2017-12-13 DIAGNOSIS — R35.0 URINARY FREQUENCY: ICD-10-CM

## 2017-12-13 PROCEDURE — 99214 OFFICE O/P EST MOD 30 MIN: CPT | Performed by: FAMILY MEDICINE

## 2017-12-13 PROCEDURE — 87086 URINE CULTURE/COLONY COUNT: CPT | Performed by: FAMILY MEDICINE

## 2017-12-13 PROCEDURE — 81003 URINALYSIS AUTO W/O SCOPE: CPT | Performed by: FAMILY MEDICINE

## 2017-12-13 PROCEDURE — 71020 XR CHEST PA + LAT CHEST (CPT=71020): CPT | Performed by: FAMILY MEDICINE

## 2017-12-13 RX ORDER — CODEINE PHOSPHATE AND GUAIFENESIN 10; 100 MG/5ML; MG/5ML
10 SOLUTION ORAL NIGHTLY PRN
Qty: 180 ML | Refills: 0 | Status: SHIPPED | OUTPATIENT
Start: 2017-12-13 | End: 2018-01-18 | Stop reason: ALTCHOICE

## 2017-12-13 RX ORDER — ACYCLOVIR 50 MG/G
CREAM TOPICAL
Qty: 5 G | Refills: 0 | Status: SHIPPED | OUTPATIENT
Start: 2017-12-13 | End: 2018-06-07 | Stop reason: ALTCHOICE

## 2017-12-13 RX ORDER — AZITHROMYCIN 250 MG/1
TABLET, FILM COATED ORAL
Qty: 6 TABLET | Refills: 0 | Status: SHIPPED | OUTPATIENT
Start: 2017-12-13 | End: 2018-01-18 | Stop reason: ALTCHOICE

## 2017-12-13 RX ORDER — BENZONATATE 100 MG/1
100 CAPSULE ORAL 3 TIMES DAILY PRN
Qty: 30 CAPSULE | Refills: 0 | Status: SHIPPED | OUTPATIENT
Start: 2017-12-13 | End: 2018-01-18 | Stop reason: ALTCHOICE

## 2017-12-13 NOTE — PROGRESS NOTES
Cheryl Ramon is a 41year old female. cc cough, chest congestion, urinary frequency  HPI:   Pt is  not feeling well for 3 days. She initially had a sore throat. Then she started to have a postnasal drip stuffiness headache tiredness fatigue.   Having sor • Intestinal infection due to Clostridium difficile    • Multinodular goiter    • Vaginitis and vulvovaginitis, unspecified       Social History:  Smoking status: Never Smoker                                                              Smokeless tobacco DIPSTICK) neg Negative/Trace mg/dL   UROBILINOGEN,SEMI-QN 0.2 0.0 - 1.9 mg/dL   NITRITE, URINE neg Negative   LEUKOCYTES neg Negative   APPEARANCE clear Clear   URINE-COLOR yellow Yellow   Multistix Lot# [id number] Numeric   Multistix Expiration Date 02*25*56 Other specified symptoms and signs involving the circulatory and respiratory systems R05 Cough     COMPARISON:  [facility] SHERIFF CHEST PA + LAT CHEST (CPT=71020), 5/16/2016, 13:55. TECHNIQUE:  PA and lateral chest radiographs were obtained.      PATIENT STATE

## 2017-12-13 NOTE — PATIENT INSTRUCTIONS
Start antibiotic today per directions. Inhaler 1 puff twice a day for 2 weeks then 1 puff once a day for 3 - 4 days and stop  Tessalon perles 100 mg -  1 every 8 hours as needed for cough. Robitussin with codeine use at bedtime for cough as needed.   Take

## 2018-01-18 ENCOUNTER — OFFICE VISIT (OUTPATIENT)
Dept: FAMILY MEDICINE CLINIC | Facility: CLINIC | Age: 48
End: 2018-01-18

## 2018-01-18 VITALS
BODY MASS INDEX: 21.17 KG/M2 | OXYGEN SATURATION: 99 % | WEIGHT: 121 LBS | RESPIRATION RATE: 18 BRPM | HEIGHT: 63.5 IN | HEART RATE: 85 BPM | SYSTOLIC BLOOD PRESSURE: 96 MMHG | TEMPERATURE: 99 F | DIASTOLIC BLOOD PRESSURE: 64 MMHG

## 2018-01-18 DIAGNOSIS — G47.9 SLEEP DIFFICULTIES: ICD-10-CM

## 2018-01-18 DIAGNOSIS — J98.01 BRONCHOSPASM: ICD-10-CM

## 2018-01-18 DIAGNOSIS — R09.82 POSTNASAL DRIP: ICD-10-CM

## 2018-01-18 DIAGNOSIS — R05.9 COUGH: Primary | ICD-10-CM

## 2018-01-18 PROCEDURE — 99214 OFFICE O/P EST MOD 30 MIN: CPT | Performed by: FAMILY MEDICINE

## 2018-01-18 RX ORDER — CEFDINIR 300 MG/1
300 CAPSULE ORAL 2 TIMES DAILY
Qty: 20 CAPSULE | Refills: 0 | Status: SHIPPED | OUTPATIENT
Start: 2018-01-18 | End: 2018-08-27 | Stop reason: ALTCHOICE

## 2018-01-18 RX ORDER — ZOLPIDEM TARTRATE 5 MG/1
5 TABLET ORAL NIGHTLY PRN
Qty: 30 TABLET | Refills: 0 | Status: SHIPPED | OUTPATIENT
Start: 2018-01-18 | End: 2018-08-27 | Stop reason: ALTCHOICE

## 2018-01-18 RX ORDER — FLUTICASONE PROPIONATE 50 MCG
2 SPRAY, SUSPENSION (ML) NASAL DAILY
Qty: 1 BOTTLE | Refills: 0 | Status: SHIPPED | OUTPATIENT
Start: 2018-01-18 | End: 2018-08-27 | Stop reason: ALTCHOICE

## 2018-01-18 NOTE — PROGRESS NOTES
Melissa Robin is a 41year old female. cc cough, postnasal drip, sleeping problems   HPI:   patients come to the office complaining of having cough. She was seen on December 14. Chest x-ray was negative for pneumonia.   She says that after this she went t Anxiety state, unspecified    • Depression    • DJD (degenerative joint disease), lumbar    • Ductal carcinoma in situ of breast 2015   • Headache(784.0)    • Hiatal hernia    • Insomnia, unspecified    • Intestinal infection due to Clostridium difficile total) by mouth 2 (two) times daily. Fluticasone Propionate (FLONASE) 50 MCG/ACT Nasal Suspension 1 Bottle 0      Si sprays by Each Nare route daily.       Zolpidem Tartrate (AMBIEN) 5 MG Oral Tab 30 tablet 0      Sig: Take 1 tablet (5 mg total) by

## 2018-01-18 NOTE — PATIENT INSTRUCTIONS
Start antibiotic today per directions. Dulera 1 puff twice a day until next visit  Fluticasone nasal spray 2 sprays each nostril once a day. Try zolpidem at bedtime for sleeping.   Take probiotic over-the-counter daily like organic yogurt while taking ant

## 2018-01-22 DIAGNOSIS — D05.11 DUCTAL CARCINOMA IN SITU (DCIS) OF RIGHT BREAST: Primary | ICD-10-CM

## 2018-01-31 ENCOUNTER — HOSPITAL ENCOUNTER (OUTPATIENT)
Dept: MAMMOGRAPHY | Facility: HOSPITAL | Age: 48
Discharge: HOME | End: 2018-01-31
Attending: SURGERY
Payer: MEDICAID

## 2018-01-31 DIAGNOSIS — D05.11 DUCTAL CARCINOMA IN SITU (DCIS) OF RIGHT BREAST: ICD-10-CM

## 2018-01-31 PROCEDURE — 77062 BREAST TOMOSYNTHESIS BI: CPT | Performed by: SURGERY

## 2018-01-31 PROCEDURE — 77066 DX MAMMO INCL CAD BI: CPT | Performed by: SURGERY

## 2018-02-14 DIAGNOSIS — F41.9 ANXIETY: ICD-10-CM

## 2018-02-15 RX ORDER — ALPRAZOLAM 0.5 MG/1
TABLET ORAL
Qty: 30 TABLET | Refills: 0 | Status: SHIPPED | OUTPATIENT
Start: 2018-02-15 | End: 2019-02-19

## 2018-02-15 NOTE — TELEPHONE ENCOUNTER
ALPRAZOLAM 0.5MG TABLETS    Not a per protocol medication.     Rx is pending for your approval.    Please print & sign,     Thank you

## 2018-04-12 ENCOUNTER — TELEPHONE (OUTPATIENT)
Dept: FAMILY MEDICINE CLINIC | Facility: CLINIC | Age: 48
End: 2018-04-12

## 2018-04-12 DIAGNOSIS — N81.10 FEMALE BLADDER PROLAPSE: Primary | ICD-10-CM

## 2018-04-12 NOTE — TELEPHONE ENCOUNTER
Msg received from [facility]. Referral to Ricardo Sawyer Urogynecologist (INTERNAL)  (PT CONFIRMED W/DR. LINO THAT THEY ACCEPT HER INSURANCE)  PT HAS APPT TOMORROW.     Reason for the order/referral:  patient saw OB/GYN needs order to see speci

## 2018-04-17 ENCOUNTER — TELEPHONE (OUTPATIENT)
Dept: FAMILY MEDICINE CLINIC | Facility: CLINIC | Age: 48
End: 2018-04-17

## 2018-04-17 NOTE — TELEPHONE ENCOUNTER
Pt completed one of our Medical Records Release of Information Forms requesting for her Medical Records from our office and Arlene Sanabria from 2004 to present to be Release to Pt.   Pt's paper chart was ordered back from storage (Recall/Iron [employer]); but paper c

## 2018-06-05 ENCOUNTER — TELEPHONE (OUTPATIENT)
Dept: FAMILY MEDICINE CLINIC | Facility: CLINIC | Age: 48
End: 2018-06-05

## 2018-06-05 DIAGNOSIS — Z01.810 PRE-OPERATIVE CARDIOVASCULAR EXAMINATION: Primary | ICD-10-CM

## 2018-06-05 NOTE — TELEPHONE ENCOUNTER
Received paperwork from Edwina López., Dr Monserrat Santana that pt needs to have blood work ( CBC, BMP, PT/PTT ICD-10 Z01.810) EKG and chest x-ray are recommended if pt is on dialysis, has cardiac history or i

## 2018-06-06 DIAGNOSIS — Z01.818 PRE-OP TESTING: Primary | ICD-10-CM

## 2018-06-06 NOTE — TELEPHONE ENCOUNTER
I spoke with patient, appointment scheduled 6/7/2018. Lab orders entered, patient will have done day of appointment.

## 2018-06-07 ENCOUNTER — LAB ENCOUNTER (OUTPATIENT)
Dept: LAB | Age: 48
End: 2018-06-07
Attending: FAMILY MEDICINE
Payer: MEDICAID

## 2018-06-07 ENCOUNTER — OFFICE VISIT (OUTPATIENT)
Dept: FAMILY MEDICINE CLINIC | Facility: CLINIC | Age: 48
End: 2018-06-07

## 2018-06-07 VITALS
DIASTOLIC BLOOD PRESSURE: 68 MMHG | BODY MASS INDEX: 20 KG/M2 | RESPIRATION RATE: 16 BRPM | TEMPERATURE: 98 F | HEART RATE: 73 BPM | WEIGHT: 115 LBS | SYSTOLIC BLOOD PRESSURE: 112 MMHG

## 2018-06-07 DIAGNOSIS — Z01.818 PRE-OP TESTING: ICD-10-CM

## 2018-06-07 DIAGNOSIS — K44.9 HIATAL HERNIA: ICD-10-CM

## 2018-06-07 DIAGNOSIS — Z85.3 HISTORY OF RIGHT BREAST CANCER: ICD-10-CM

## 2018-06-07 DIAGNOSIS — F41.9 ANXIETY: ICD-10-CM

## 2018-06-07 DIAGNOSIS — Z01.818 PREOP EXAMINATION: Primary | ICD-10-CM

## 2018-06-07 DIAGNOSIS — D70.9 NEUTROPENIA, UNSPECIFIED TYPE (HCC): ICD-10-CM

## 2018-06-07 PROCEDURE — 85025 COMPLETE CBC W/AUTO DIFF WBC: CPT

## 2018-06-07 PROCEDURE — 36415 COLL VENOUS BLD VENIPUNCTURE: CPT

## 2018-06-07 PROCEDURE — 85610 PROTHROMBIN TIME: CPT

## 2018-06-07 PROCEDURE — 80048 BASIC METABOLIC PNL TOTAL CA: CPT

## 2018-06-07 PROCEDURE — 85730 THROMBOPLASTIN TIME PARTIAL: CPT

## 2018-06-07 PROCEDURE — 99244 OFF/OP CNSLTJ NEW/EST MOD 40: CPT | Performed by: FAMILY MEDICINE

## 2018-06-07 NOTE — PROGRESS NOTES
Ny Ferguson is a 57year old female who presents for a pre-operative physical exam. Patient is to have a prolonged exchange with local tissue advancement flap to right lower breast pole with use of UP drains and pain pumps to be done by Dr. Barron Colon Depression    • DJD (degenerative joint disease), lumbar    • Ductal carcinoma in situ of breast 2015   • Headache(784.0)    • Hiatal hernia    • Insomnia, unspecified    • Intestinal infection due to Clostridium difficile    • Multinodular goiter    • Vag lesions  HEENT: atraumatic, normocephalic,ears and throat are clear  EYES:PERRLA, EOMI, conjunctiva are clear  NECK: supple,no adenopathy,  CHEST: no chest tenderness  BREAST: deffered  LUNGS: clear to auscultation  CARDIO: RRR without murmur  GI: good BS'

## 2018-06-20 ENCOUNTER — TELEPHONE (OUTPATIENT)
Dept: FAMILY MEDICINE CLINIC | Facility: CLINIC | Age: 48
End: 2018-06-20

## 2018-08-14 ENCOUNTER — TELEPHONE (OUTPATIENT)
Dept: FAMILY MEDICINE CLINIC | Facility: CLINIC | Age: 48
End: 2018-08-14

## 2018-08-14 DIAGNOSIS — E07.9 DISORDER OF THYROID GLAND: Primary | ICD-10-CM

## 2018-08-14 NOTE — TELEPHONE ENCOUNTER
Referral for ENDO with Gary Payne (INTERNAL called to verify)  1970    MAY NEED CLINICAL NOTES ADDED     Reason for the order/referral: office visit   PCP:  Dr Camila Calderon,   Refer to Provider (first and last name): Dr Gary Payne   Specialty: Endo

## 2018-08-20 ENCOUNTER — TELEPHONE (OUTPATIENT)
Dept: FAMILY MEDICINE CLINIC | Facility: CLINIC | Age: 48
End: 2018-08-20

## 2018-08-20 NOTE — TELEPHONE ENCOUNTER
Left a message for the pt RE: who the appt is for. Not sure if it is for his wife Brianna or her ,  Apparently there was a bot of confusion when he was speaking on the   Phone with Tod Lizama.     Dr. Teja Mon has an opening on Wednesday, however not mack

## 2018-08-27 ENCOUNTER — LAB ENCOUNTER (OUTPATIENT)
Dept: LAB | Facility: HOSPITAL | Age: 48
End: 2018-08-27
Attending: FAMILY MEDICINE
Payer: MEDICAID

## 2018-08-27 ENCOUNTER — HOSPITAL ENCOUNTER (OUTPATIENT)
Dept: GENERAL RADIOLOGY | Facility: HOSPITAL | Age: 48
Discharge: HOME | End: 2018-08-27
Attending: FAMILY MEDICINE
Payer: MEDICAID

## 2018-08-27 ENCOUNTER — OFFICE VISIT (OUTPATIENT)
Dept: FAMILY MEDICINE CLINIC | Facility: CLINIC | Age: 48
End: 2018-08-27
Payer: MEDICAID

## 2018-08-27 VITALS
HEART RATE: 95 BPM | RESPIRATION RATE: 16 BRPM | DIASTOLIC BLOOD PRESSURE: 58 MMHG | WEIGHT: 115 LBS | HEIGHT: 63.5 IN | BODY MASS INDEX: 20.12 KG/M2 | SYSTOLIC BLOOD PRESSURE: 88 MMHG | TEMPERATURE: 99 F

## 2018-08-27 DIAGNOSIS — Z85.3 HISTORY OF RIGHT BREAST CANCER: ICD-10-CM

## 2018-08-27 DIAGNOSIS — M79.605 LOW BACK PAIN RADIATING TO LEFT LEG: ICD-10-CM

## 2018-08-27 DIAGNOSIS — R07.9 CHEST PAIN AT REST: ICD-10-CM

## 2018-08-27 DIAGNOSIS — F41.9 ANXIETY: ICD-10-CM

## 2018-08-27 DIAGNOSIS — M54.50 LOW BACK PAIN RADIATING TO LEFT LEG: ICD-10-CM

## 2018-08-27 DIAGNOSIS — M79.605 LEG PAIN, LEFT: ICD-10-CM

## 2018-08-27 DIAGNOSIS — R06.02 SHORTNESS OF BREATH: ICD-10-CM

## 2018-08-27 DIAGNOSIS — R25.2 MUSCLE CRAMPS: ICD-10-CM

## 2018-08-27 DIAGNOSIS — R07.9 CHEST PAIN AT REST: Primary | ICD-10-CM

## 2018-08-27 DIAGNOSIS — R07.1 CHEST PAIN ON BREATHING: ICD-10-CM

## 2018-08-27 LAB
ALBUMIN SERPL-MCNC: 3.9 G/DL (ref 3.5–4.8)
ALBUMIN/GLOB SERPL: 1.2 {RATIO} (ref 1–2)
ALP LIVER SERPL-CCNC: 72 U/L (ref 39–100)
ALT SERPL-CCNC: 40 U/L (ref 14–54)
ANION GAP SERPL CALC-SCNC: 9 MMOL/L (ref 0–18)
AST SERPL-CCNC: 26 U/L (ref 15–41)
BASOPHILS # BLD AUTO: 0.05 X10(3) UL (ref 0–0.1)
BASOPHILS NFR BLD AUTO: 1 %
BILIRUB SERPL-MCNC: 0.2 MG/DL (ref 0.1–2)
BUN BLD-MCNC: 12 MG/DL (ref 8–20)
BUN/CREAT SERPL: 14.1 (ref 10–20)
CALCIUM BLD-MCNC: 9.1 MG/DL (ref 8.3–10.3)
CHLORIDE SERPL-SCNC: 106 MMOL/L (ref 101–111)
CO2 SERPL-SCNC: 25 MMOL/L (ref 22–32)
CREAT BLD-MCNC: 0.85 MG/DL (ref 0.55–1.02)
CRP SERPL-MCNC: <0.29 MG/DL (ref ?–1)
D-DIMER: <0.27 UG/ML FEU (ref 0–0.49)
EOSINOPHIL # BLD AUTO: 0.1 X10(3) UL (ref 0–0.3)
EOSINOPHIL NFR BLD AUTO: 2 %
ERYTHROCYTE [DISTWIDTH] IN BLOOD BY AUTOMATED COUNT: 12.5 % (ref 11.5–16)
GLOBULIN PLAS-MCNC: 3.2 G/DL (ref 2.5–4)
GLUCOSE BLD-MCNC: 101 MG/DL (ref 70–99)
HAV IGM SER QL: 2.1 MG/DL (ref 1.8–2.5)
HCT VFR BLD AUTO: 41.2 % (ref 34–50)
HGB BLD-MCNC: 13.6 G/DL (ref 12–16)
IMMATURE GRANULOCYTE COUNT: 0.01 X10(3) UL (ref 0–1)
IMMATURE GRANULOCYTE RATIO %: 0.2 %
LYMPHOCYTES # BLD AUTO: 1.96 X10(3) UL (ref 0.9–4)
LYMPHOCYTES NFR BLD AUTO: 39.1 %
M PROTEIN MFR SERPL ELPH: 7.1 G/DL (ref 6.1–8.3)
MCH RBC QN AUTO: 28.4 PG (ref 27–33.2)
MCHC RBC AUTO-ENTMCNC: 33 G/DL (ref 31–37)
MCV RBC AUTO: 86 FL (ref 81–100)
MONOCYTES # BLD AUTO: 0.55 X10(3) UL (ref 0.1–1)
MONOCYTES NFR BLD AUTO: 11 %
NEUTROPHIL ABS PRELIM: 2.34 X10 (3) UL (ref 1.3–6.7)
NEUTROPHILS # BLD AUTO: 2.34 X10(3) UL (ref 1.3–6.7)
NEUTROPHILS NFR BLD AUTO: 46.7 %
OSMOLALITY SERPL CALC.SUM OF ELEC: 290 MOSM/KG (ref 275–295)
PLATELET # BLD AUTO: 276 10(3)UL (ref 150–450)
POTASSIUM SERPL-SCNC: 4 MMOL/L (ref 3.6–5.1)
RBC # BLD AUTO: 4.79 X10(6)UL (ref 3.8–5.1)
RED CELL DISTRIBUTION WIDTH-SD: 39.2 FL (ref 35.1–46.3)
SED RATE-ML: 6 MM/HR (ref 0–25)
SODIUM SERPL-SCNC: 140 MMOL/L (ref 136–144)
TSI SER-ACNC: 1.5 MIU/ML (ref 0.35–5.5)
WBC # BLD AUTO: 5 X10(3) UL (ref 4–13)

## 2018-08-27 PROCEDURE — 85378 FIBRIN DEGRADE SEMIQUANT: CPT

## 2018-08-27 PROCEDURE — 86140 C-REACTIVE PROTEIN: CPT

## 2018-08-27 PROCEDURE — 85652 RBC SED RATE AUTOMATED: CPT

## 2018-08-27 PROCEDURE — 36415 COLL VENOUS BLD VENIPUNCTURE: CPT

## 2018-08-27 PROCEDURE — 71046 X-RAY EXAM CHEST 2 VIEWS: CPT | Performed by: FAMILY MEDICINE

## 2018-08-27 PROCEDURE — 84443 ASSAY THYROID STIM HORMONE: CPT

## 2018-08-27 PROCEDURE — 85025 COMPLETE CBC W/AUTO DIFF WBC: CPT

## 2018-08-27 PROCEDURE — 93000 ELECTROCARDIOGRAM COMPLETE: CPT | Performed by: FAMILY MEDICINE

## 2018-08-27 PROCEDURE — 99215 OFFICE O/P EST HI 40 MIN: CPT | Performed by: FAMILY MEDICINE

## 2018-08-27 PROCEDURE — 80053 COMPREHEN METABOLIC PANEL: CPT

## 2018-08-27 PROCEDURE — 83735 ASSAY OF MAGNESIUM: CPT

## 2018-08-27 NOTE — PATIENT INSTRUCTIONS
Do blood work today. Do chest x-ray today. Keep good hydration. We will call you with test results when they are back. Call [phone] to schedule MRI lumbar spine.

## 2018-08-27 NOTE — PROGRESS NOTES
Carly Banks is a 57year old female. cc chest pain, feeling short of breath, left lower extremity pain  HPI:   Patient is coming to the office complaining of having chest pain on the left side of the chest wall. Started to have it 2 months ago.   The eric aspect no history of injury. Some tenderness in the neck but she will return to go over those symptoms at later time. Current Outpatient Prescriptions:  ALPRAZOLAM 0.5 MG Oral Tab TAKE 1 TABLET BY MOUTH DAILY AS NEEDED.  Disp: 30 tablet Rfl: 0   Mul GENERAL: well developed, well nourished,in no apparent distress, numbness with her  who is helping to explain patient's symptoms due to language  barrier.   SKIN: no rashes,no suspicious lesions  HEENT: atraumatic, normocephalic,ears and throat are back.  Call [phone] to schedule MRI lumbar spine. Imaging & Consults:  ELECTROCARDIOGRAM, COMPLETE  EKG 12-LEAD  MRI SPINE LUMBAR (CPT=72148)  CT CHEST(CONTRAST ONLY) (CPT=71260)     X-ray was reviewed.   Since patient complains of having pain on t

## 2018-09-02 ENCOUNTER — HOSPITAL ENCOUNTER (EMERGENCY)
Facility: HOSPITAL | Age: 48
Discharge: HOME | End: 2018-09-02
Attending: EMERGENCY MEDICINE
Payer: MEDICAID

## 2018-09-02 ENCOUNTER — APPOINTMENT (OUTPATIENT)
Dept: CT IMAGING | Facility: HOSPITAL | Age: 48
End: 2018-09-02
Attending: EMERGENCY MEDICINE
Payer: MEDICAID

## 2018-09-02 VITALS
BODY MASS INDEX: 19.29 KG/M2 | HEIGHT: 64 IN | DIASTOLIC BLOOD PRESSURE: 73 MMHG | HEART RATE: 72 BPM | SYSTOLIC BLOOD PRESSURE: 98 MMHG | RESPIRATION RATE: 16 BRPM | OXYGEN SATURATION: 98 % | WEIGHT: 113 LBS | TEMPERATURE: 99 F

## 2018-09-02 DIAGNOSIS — M54.12 CERVICAL RADICULOPATHY: ICD-10-CM

## 2018-09-02 DIAGNOSIS — R09.1 PLEURISY: Primary | ICD-10-CM

## 2018-09-02 LAB
ALBUMIN SERPL-MCNC: 3.7 G/DL (ref 3.5–4.8)
ALBUMIN/GLOB SERPL: 1.2 {RATIO} (ref 1–2)
ALP LIVER SERPL-CCNC: 52 U/L (ref 39–100)
ALT SERPL-CCNC: 33 U/L (ref 14–54)
ANION GAP SERPL CALC-SCNC: 8 MMOL/L (ref 0–18)
AST SERPL-CCNC: 21 U/L (ref 15–41)
BASOPHILS # BLD AUTO: 0.04 X10(3) UL (ref 0–0.1)
BASOPHILS NFR BLD AUTO: 1 %
BILIRUB SERPL-MCNC: 0.4 MG/DL (ref 0.1–2)
BUN BLD-MCNC: 8 MG/DL (ref 8–20)
BUN/CREAT SERPL: 15.1 (ref 10–20)
CALCIUM BLD-MCNC: 8.9 MG/DL (ref 8.3–10.3)
CHLORIDE SERPL-SCNC: 109 MMOL/L (ref 101–111)
CO2 SERPL-SCNC: 23 MMOL/L (ref 22–32)
CREAT BLD-MCNC: 0.53 MG/DL (ref 0.55–1.02)
EOSINOPHIL # BLD AUTO: 0.1 X10(3) UL (ref 0–0.3)
EOSINOPHIL NFR BLD AUTO: 2.6 %
ERYTHROCYTE [DISTWIDTH] IN BLOOD BY AUTOMATED COUNT: 12.3 % (ref 11.5–16)
GLOBULIN PLAS-MCNC: 3 G/DL (ref 2.5–4)
GLUCOSE BLD-MCNC: 83 MG/DL (ref 70–99)
HCT VFR BLD AUTO: 39.9 % (ref 34–50)
HGB BLD-MCNC: 13.2 G/DL (ref 12–16)
IMMATURE GRANULOCYTE COUNT: 0.01 X10(3) UL (ref 0–1)
IMMATURE GRANULOCYTE RATIO %: 0.3 %
LYMPHOCYTES # BLD AUTO: 1.78 X10(3) UL (ref 0.9–4)
LYMPHOCYTES NFR BLD AUTO: 46 %
M PROTEIN MFR SERPL ELPH: 6.7 G/DL (ref 6.1–8.3)
MCH RBC QN AUTO: 28.4 PG (ref 27–33.2)
MCHC RBC AUTO-ENTMCNC: 33.1 G/DL (ref 31–37)
MCV RBC AUTO: 85.8 FL (ref 81–100)
MONOCYTES # BLD AUTO: 0.5 X10(3) UL (ref 0.1–1)
MONOCYTES NFR BLD AUTO: 12.9 %
NEUTROPHIL ABS PRELIM: 1.44 X10 (3) UL (ref 1.3–6.7)
NEUTROPHILS # BLD AUTO: 1.44 X10(3) UL (ref 1.3–6.7)
NEUTROPHILS NFR BLD AUTO: 37.2 %
OSMOLALITY SERPL CALC.SUM OF ELEC: 287 MOSM/KG (ref 275–295)
PLATELET # BLD AUTO: 269 10(3)UL (ref 150–450)
POTASSIUM SERPL-SCNC: 3.7 MMOL/L (ref 3.6–5.1)
RBC # BLD AUTO: 4.65 X10(6)UL (ref 3.8–5.1)
RED CELL DISTRIBUTION WIDTH-SD: 38.8 FL (ref 35.1–46.3)
SODIUM SERPL-SCNC: 140 MMOL/L (ref 136–144)
TROPONIN I SERPL-MCNC: <0.046 NG/ML (ref ?–0.05)
WBC # BLD AUTO: 3.9 X10(3) UL (ref 4–13)

## 2018-09-02 PROCEDURE — 72125 CT NECK SPINE W/O DYE: CPT | Performed by: EMERGENCY MEDICINE

## 2018-09-02 PROCEDURE — 99285 EMERGENCY DEPT VISIT HI MDM: CPT

## 2018-09-02 PROCEDURE — 80053 COMPREHEN METABOLIC PANEL: CPT | Performed by: EMERGENCY MEDICINE

## 2018-09-02 PROCEDURE — 96374 THER/PROPH/DIAG INJ IV PUSH: CPT

## 2018-09-02 PROCEDURE — 85025 COMPLETE CBC W/AUTO DIFF WBC: CPT | Performed by: EMERGENCY MEDICINE

## 2018-09-02 PROCEDURE — 93005 ELECTROCARDIOGRAM TRACING: CPT

## 2018-09-02 PROCEDURE — 80053 COMPREHEN METABOLIC PANEL: CPT

## 2018-09-02 PROCEDURE — 93010 ELECTROCARDIOGRAM REPORT: CPT

## 2018-09-02 PROCEDURE — 71275 CT ANGIOGRAPHY CHEST: CPT | Performed by: EMERGENCY MEDICINE

## 2018-09-02 PROCEDURE — 84484 ASSAY OF TROPONIN QUANT: CPT

## 2018-09-02 PROCEDURE — 84484 ASSAY OF TROPONIN QUANT: CPT | Performed by: EMERGENCY MEDICINE

## 2018-09-02 PROCEDURE — 85025 COMPLETE CBC W/AUTO DIFF WBC: CPT

## 2018-09-02 RX ORDER — CYCLOBENZAPRINE HCL 10 MG
10 TABLET ORAL 3 TIMES DAILY PRN
Qty: 20 TABLET | Refills: 0 | Status: SHIPPED | OUTPATIENT
Start: 2018-09-02 | End: 2018-09-09

## 2018-09-02 RX ORDER — NAPROXEN 500 MG/1
500 TABLET ORAL 2 TIMES DAILY PRN
Qty: 20 TABLET | Refills: 0 | Status: SHIPPED | OUTPATIENT
Start: 2018-09-02 | End: 2018-09-09

## 2018-09-02 RX ORDER — MORPHINE SULFATE 4 MG/ML
4 INJECTION, SOLUTION INTRAMUSCULAR; INTRAVENOUS ONCE
Status: COMPLETED | OUTPATIENT
Start: 2018-09-02 | End: 2018-09-02

## 2018-09-02 RX ORDER — PREDNISONE 20 MG/1
60 TABLET ORAL DAILY
Qty: 15 TABLET | Refills: 0 | Status: SHIPPED | OUTPATIENT
Start: 2018-09-02 | End: 2018-09-07

## 2018-09-02 NOTE — ED PROVIDER NOTES
Patient Seen in: [facility] Emergency Department    History   Patient presents with:  Chest Pain Angina (cardiovascular)    Stated Complaint: Chest Pain    HPI    Patient is a 61-year-old female with history of anxiety and remote history of breast c Smokeless tobacco: Never Used                      Alcohol use: Yes           0.0 oz/week      Review of Systems    Positive for stated complaint: Chest Pain  Other systems are as noted in HPI.   Co Abnormal; Notable for the following:     WBC 3.9 (*)     All other components within normal limits   TROPONIN I - Normal   CBC WITH DIFFERENTIAL WITH PLATELET    Narrative:      The following orders were created for panel order CBC WITH DIFFERENTIAL WITH PL more severe in the last 2 weeks. Also for the last 2 weeks she has some neck pain, radiating to the trapezius along with some paresthesias in her left pinky, ring, index fingers.   She saw her primary care physician, had a normal chest x-ray and a negative

## 2018-09-02 NOTE — ED INITIAL ASSESSMENT (HPI)
Pt c/o left sided chest pain radiating to left scapula with pain increased with inspiration x 2 weeks. Pt saw her PMD on Thursday, had CXR and was told she needed a CT scan of her chest to r/o pleuritic chest pain.   Pt states cannot wait until Friday to h

## 2018-09-04 ENCOUNTER — TELEPHONE (OUTPATIENT)
Dept: FAMILY MEDICINE CLINIC | Facility: CLINIC | Age: 48
End: 2018-09-04

## 2018-09-04 LAB
ATRIAL RATE: 77 BPM
P AXIS: 65 DEGREES
P-R INTERVAL: 150 MS
Q-T INTERVAL: 370 MS
QRS DURATION: 72 MS
QTC CALCULATION (BEZET): 418 MS
R AXIS: 86 DEGREES
T AXIS: 64 DEGREES
VENTRICULAR RATE: 77 BPM

## 2018-09-04 NOTE — TELEPHONE ENCOUNTER
MRI spine denied - [id number] can be done      Denial reason your records show that you have back and/or neck pain. One of the following was not supported      1.  Failed improvement after 6 weeks of conservative treatment in past 3 months with physician

## 2018-09-05 NOTE — TELEPHONE ENCOUNTER
Called set up peer to peer for 9/6 @ noon     advised doctor that they will do peer to peer however if appeal from is not filled out it will not change anything until form is filled out.

## 2018-09-17 ENCOUNTER — TELEPHONE (OUTPATIENT)
Dept: FAMILY MEDICINE CLINIC | Facility: CLINIC | Age: 48
End: 2018-09-17

## 2018-09-17 RX ORDER — DICLOFENAC SODIUM 75 MG/1
75 TABLET, DELAYED RELEASE ORAL 2 TIMES DAILY
Qty: 30 TABLET | Refills: 0 | Status: SHIPPED | OUTPATIENT
Start: 2018-09-17 | End: 2019-05-29 | Stop reason: ALTCHOICE

## 2018-09-17 NOTE — TELEPHONE ENCOUNTER
Call to pt-advised of dr's recommendation for diclofenac instead of naproxen, advil or any other otc anti inflammatory meds. Reinforced not to take any of those meds if starting diclofenac. Pt sts would like to proceed w prescription.  Instructed in sig a

## 2018-09-17 NOTE — TELEPHONE ENCOUNTER
Per dr Kashif Estrada if pt can not make appt tomorrow, advise her to call back tomorrow to sched appt for Thursday-by then can use hosp follow up slots.    In the meantime, if she prefers to use ibuprofen instead of naproxen, can take ibuprofen 600 mg klaus

## 2018-09-17 NOTE — TELEPHONE ENCOUNTER
1. What are your symptoms? Pain in the heart area and back area        2. How long have you been having these symptoms? 4 weeks    3. Have you done anything already to treat your symptoms?      Saw Dr. Amelia Clemons and has gone to the ER      ADDITIONAL

## 2018-09-17 NOTE — TELEPHONE ENCOUNTER
Pt reports left sided chest/left neck/shoulder pain and  tingling in her left pinky ring and middle fingers. No hand or arm weakness or numbness. Still sl short of breath. Denies wheezing.  sts all symptoms are the same as in ER 9/2/18-no worse- and \"medi

## 2018-09-17 NOTE — TELEPHONE ENCOUNTER
Could she come Wed at 2 PM?   I would like Patient Notes with Pulmonologist Sent to [address] [place] [address], so I know what was his plan.

## 2018-09-20 NOTE — PATIENT INSTRUCTIONS
Start venlafaxine 37.5 mg daily for 5-7 days then increase to 2 capsules daily. Use alprazolam as needed for stress/anxiety. Schedule appointment to see neurosurgeon Dr. Esme Llamas for evaluation of your symptoms.   Schedule appointment with neurologist

## 2018-09-20 NOTE — PROGRESS NOTES
Quin Oliva is a 57year old female. cc chest pain, feeling short of breath, left lower extremity pain  HPI:   Patient is coming to the office complaining of having chest pain on the left side of the chest wall. Started to have it 2 months ago.   The eric aspect no history of injury. Some tenderness in the neck but she will return to go over those symptoms at later time.   Patient is feeling nervous anxious worrying about things trouble relaxing rest less annoyed or irritated afraid if something awful might aspect of the left calf, patient complains of having some cramps in her left leg at that area also having some numbness and tingling lateral aspect of the left forearm  Musculoskeletal - having pain and cramps and numbness and tingling in the lateral aspec encounter diagnosis)  History of right breast cancer  Neck pain  Numbness and tingling of left upper extremity  Leg pain, left  Chronic left-sided low back pain with sciatica, sciatica laterality unspecified    No orders of the defined types were placed in Approved by: Gui Nelson MD              The patient indicates understanding of these issues and agrees to the plan. The patient is asked to return in after test results.     Time spent was 40min, more than 50% was spent on counseling regarding me

## 2018-09-20 NOTE — PROGRESS NOTES
Gerald Ahmadi is a 57year old female. cc anxiety, low back pain, numbness and tingling in the left upper extremity chest pain,  left lower extremity pain, neck pain, history of breast cancer  HPI:   She is coming to the office for follow-up.   She has epis request an appeal of denial.  At this point patient will be referred to see neurosurgeon for further evaluation. Checked during last visit came back unremarkable.     Patient also noticed to have some numbness and tingling at the left upper extremity at th any unusual skin lesions or rashes  HEENT no congestion no runny nose no sore throat  Neck no neck pain  RESPIRATORY: denies shortness of breath with exertion  CARDIOVASCULAR: denies chest pain on exertion  GI: denies abdominal pain and denies heartburn  N hand  Psychiatric - alert  and oriented x3, normal mood     EKG reviewed in office during last visit -  normal sinus rhythm heart rate 86, normal intervals no acute ST changes    Results for orders placed or performed during the hospital encounter of 09/02 09/02/18  2:27 PM   Result Value Ref Range    WBC 3.9 (L) 4.0 - 13.0 x10(3) uL    RBC 4.65 3.80 - 5.10 x10(6)uL    HGB 13.2 12.0 - 16.0 g/dL    HCT 39.9 34.0 - 50.0 %    .0 150.0 - 450.0 10(3)uL    MCV 85.8 81.0 - 100.0 fL    MCH 28.4 27.0 - 33.2 pg neurologist Dr. Deandre Saucedo for evaluation of your symptoms. Healthy diet. Keep good hydration. Follow-up in the office after 4-6 weeks for reevaluation and check on venlafaxine.     Was feeling sweaty in the office  and warm -  temperature however in the o

## 2018-09-24 ENCOUNTER — TELEPHONE (OUTPATIENT)
Dept: FAMILY MEDICINE CLINIC | Facility: CLINIC | Age: 48
End: 2018-09-24

## 2018-09-24 RX ORDER — VENLAFAXINE HYDROCHLORIDE 75 MG/1
75 CAPSULE, EXTENDED RELEASE ORAL DAILY
Qty: 30 CAPSULE | Refills: 1 | Status: SHIPPED | OUTPATIENT
Start: 2018-09-24 | End: 2019-01-21

## 2018-09-24 NOTE — TELEPHONE ENCOUNTER
Silviano asked pt to call our office for refill of Venlafaxine HCl ER 37.5 MG Oral Capsule SR 24 Hr. Silviano gave pt 5 pills to hold him over; he only has one pill left.

## 2018-09-24 NOTE — TELEPHONE ENCOUNTER
[facility] called. The insurance company will not fill the venlafaxine for 37.5 mg 2 daily. They dispensed #5 for the pt. But now they need a prescription for venlafaxine SR 75 mg 1 daily.  Verbal authorization given for # 30 with 1 refill  Based on the

## 2018-10-11 ENCOUNTER — OFFICE VISIT (OUTPATIENT)
Dept: SURGERY | Facility: CLINIC | Age: 48
End: 2018-10-11
Payer: MEDICAID

## 2018-10-11 VITALS
HEART RATE: 94 BPM | SYSTOLIC BLOOD PRESSURE: 100 MMHG | BODY MASS INDEX: 18.78 KG/M2 | WEIGHT: 110 LBS | HEIGHT: 64 IN | DIASTOLIC BLOOD PRESSURE: 70 MMHG

## 2018-10-11 DIAGNOSIS — R07.9 CHEST PAIN OF UNCERTAIN ETIOLOGY: ICD-10-CM

## 2018-10-11 DIAGNOSIS — R20.2 NUMBNESS AND TINGLING IN LEFT ARM: ICD-10-CM

## 2018-10-11 DIAGNOSIS — R20.0 NUMBNESS AND TINGLING IN LEFT ARM: ICD-10-CM

## 2018-10-11 DIAGNOSIS — R51.9 LEFT-SIDED HEADACHE: ICD-10-CM

## 2018-10-11 DIAGNOSIS — M54.16 LUMBAR RADICULOPATHY: ICD-10-CM

## 2018-10-11 DIAGNOSIS — M54.2 NECK PAIN ON LEFT SIDE: Primary | ICD-10-CM

## 2018-10-11 PROCEDURE — 99244 OFF/OP CNSLTJ NEW/EST MOD 40: CPT | Performed by: PHYSICIAN ASSISTANT

## 2018-10-11 RX ORDER — CYCLOBENZAPRINE HCL 10 MG
10 TABLET ORAL 3 TIMES DAILY PRN
Qty: 60 TABLET | Refills: 1 | Status: SHIPPED | OUTPATIENT
Start: 2018-10-11 | End: 2018-11-01 | Stop reason: ALTCHOICE

## 2018-10-11 RX ORDER — GABAPENTIN 100 MG/1
100 CAPSULE ORAL 3 TIMES DAILY
Qty: 60 CAPSULE | Refills: 0 | Status: SHIPPED | OUTPATIENT
Start: 2018-10-11 | End: 2018-11-01 | Stop reason: ALTCHOICE

## 2018-10-11 RX ORDER — DIAZEPAM 5 MG/1
5 TABLET ORAL ONCE AS NEEDED
Qty: 2 TABLET | Refills: 0 | Status: SHIPPED | OUTPATIENT
Start: 2018-10-11 | End: 2018-10-11

## 2018-10-11 NOTE — PROGRESS NOTES
Location of Pain: Pt states that she has left sided neck pain. Pt states that she has headaches on the left side. Pt states that she has radiating left arm pain. Pt states that she has numbness and tingling in the left arm and left hand.  Pt states that she

## 2018-10-11 NOTE — H&P
Neurosurgery Clinic Visit  10/11/2018    Rosita Silva PCP:  Jeanie Kong MD    1970 MRN [id number]       CHIEF COMPLAINT:  Patient presents with:  New Patient: Neck Pain       HISTORY OF PRESENT ILLNESS:  Rosita Silva is a(n) 57year old note, she has a history of right sided breast cancer which she underwent surgical resection for, but she did not have any chemotherapy or RT against recommendations. She was started on an antidepressant which does not help.    Diclofenac does not help eith • Cancer Maternal Grandfather         rectal cancer   • Other (Other) Father 54        choked on food   • Breast Cancer Self 39     Brianna  reports that  has never smoked. she has never used smokeless tobacco. She reports that she drinks alcohol.  She re Dorsi- flexion EHL   Left 4/5 5/5 5/5 5/5 5/5 5/5   Right 5/5 5/5 5/5 5/5 5/5 5/5     DTRs:   Biceps Triceps Brachioradialis Patellar Achilles   Left 3+ 2+ 2+ 2+ 2+   Right 3+ 2+ 2+ 2+ 2+     Clonus:  Absent. Santa's:  Absent. Romberg:  Negative.   Pron care.  Treasure Tapia of education / counseling: Pathology, treatment options, and expected outcomes. Maude Kahn M.S., PA-C  [facility]  [address] [facility], [facility] [address], [address] [place] [address]  [phone]  10/11/2018  8:42 AM

## 2018-10-11 NOTE — PATIENT INSTRUCTIONS
Refill policies:    • Allow 2-3 business days for refills; controlled substances may take longer.   • Contact your pharmacy at least 5 days prior to running out of medication and have them send an electronic request or submit request through the “request re entire amount billed. Precertification and Prior Authorizations: If your physician has recommended that you have a procedure or additional testing performed.   [facility]) will contact your insurance carrier to obtain pre-certi

## 2018-10-22 ENCOUNTER — OFFICE VISIT (OUTPATIENT)
Dept: NEUROLOGY | Facility: CLINIC | Age: 48
End: 2018-10-22
Payer: MEDICAID

## 2018-10-22 VITALS
DIASTOLIC BLOOD PRESSURE: 70 MMHG | HEART RATE: 72 BPM | WEIGHT: 107 LBS | SYSTOLIC BLOOD PRESSURE: 118 MMHG | BODY MASS INDEX: 18 KG/M2 | RESPIRATION RATE: 16 BRPM

## 2018-10-22 DIAGNOSIS — R51.9 LEFT-SIDED HEADACHE: ICD-10-CM

## 2018-10-22 DIAGNOSIS — M54.16 LUMBAR RADICULOPATHY: ICD-10-CM

## 2018-10-22 DIAGNOSIS — M62.838 TRAPEZIUS MUSCLE SPASM: Primary | ICD-10-CM

## 2018-10-22 DIAGNOSIS — R20.2 PARESTHESIA OF LEFT ARM: ICD-10-CM

## 2018-10-22 DIAGNOSIS — G44.209 TENSION HEADACHE: ICD-10-CM

## 2018-10-22 PROCEDURE — 99214 OFFICE O/P EST MOD 30 MIN: CPT | Performed by: OTHER

## 2018-10-22 RX ORDER — DIAZEPAM 5 MG/1
5 TABLET ORAL AS NEEDED
Refills: 0 | COMMUNITY
Start: 2018-10-11 | End: 2019-02-19 | Stop reason: ALTCHOICE

## 2018-10-22 NOTE — PROGRESS NOTES
[facility]   Neurology- INITIAL CLINIC VISIT  10/22/2018, 10:30 AM     Patty Keating Patient Status:  No patient class for patient encounter    1970 MRN [id number]   Location ED [facility], [address] [place] [address] change at C4-5 and C5-6.        Past Medical History:  Past Medical History:   Diagnosis Date   • Anxiety state, unspecified    • Breast CA ([facility] [address]    • Depression    • DJD (degenerative joint disease), lumbar    • Ductal carcinoma in situ of breast 2015   • capsule, Rfl: 1  •  Diclofenac Sodium 75 MG Oral Tab EC, Take 1 tablet (75 mg total) by mouth 2 (two) times daily. , Disp: 30 tablet, Rfl: 0  •  ALPRAZOLAM 0.5 MG Oral Tab, TAKE 1 TABLET BY MOUTH DAILY AS NEEDED., Disp: 30 tablet, Rfl: 0  •  Multiple Vitami without any difficulty. Negative tinels at elbow     ASSESSMENT/ACTIVE PROBLEM LIST:   1. Trapezius muscle spasm  - OP REFERRAL TO [facility]    2. Left-sided headache    3. Lumbar radiculopathy  - EMG; Future    4.  Tension headache were addressed.      Manisha Joyce, DO  Neuromuscular and General Neurology  [facility]

## 2018-10-22 NOTE — PATIENT INSTRUCTIONS
Refill policies:    • Allow 2-3 business days for refills; controlled substances may take longer.   • Contact your pharmacy at least 5 days prior to running out of medication and have them send an electronic request or submit request through the “request re entire amount billed. Precertification and Prior Authorizations: If your physician has recommended that you have a procedure or additional testing performed.   [facility]) will contact your insurance carrier to obtain pre-certi spine and on the arms and legs. 2. Please keep the arm or leg to be tested WARM and DRY. Especially in the winter months, keep them well wrapped.   Cold extremities will affect the results of the conduction studies, even though the limbs may be warmed a

## 2018-10-23 ENCOUNTER — HOSPITAL ENCOUNTER (OUTPATIENT)
Dept: MRI IMAGING | Age: 48
Discharge: HOME | End: 2018-10-23
Attending: PHYSICIAN ASSISTANT
Payer: MEDICAID

## 2018-10-23 ENCOUNTER — PROCEDURE VISIT (OUTPATIENT)
Dept: NEUROLOGY | Facility: CLINIC | Age: 48
End: 2018-10-23
Payer: MEDICAID

## 2018-10-23 DIAGNOSIS — R20.0 NUMBNESS AND TINGLING IN LEFT ARM: ICD-10-CM

## 2018-10-23 DIAGNOSIS — M54.2 NECK PAIN ON LEFT SIDE: ICD-10-CM

## 2018-10-23 DIAGNOSIS — R51.9 LEFT-SIDED HEADACHE: ICD-10-CM

## 2018-10-23 DIAGNOSIS — R20.2 PARESTHESIA OF LEFT ARM: Primary | ICD-10-CM

## 2018-10-23 DIAGNOSIS — R07.9 CHEST PAIN OF UNCERTAIN ETIOLOGY: ICD-10-CM

## 2018-10-23 DIAGNOSIS — M54.16 LUMBAR RADICULOPATHY: ICD-10-CM

## 2018-10-23 DIAGNOSIS — R20.2 NUMBNESS AND TINGLING IN LEFT ARM: ICD-10-CM

## 2018-10-23 PROCEDURE — 95887 MUSC TST DONE W/N TST NONEXT: CPT | Performed by: OTHER

## 2018-10-23 PROCEDURE — 72141 MRI NECK SPINE W/O DYE: CPT | Performed by: PHYSICIAN ASSISTANT

## 2018-10-23 PROCEDURE — 72146 MRI CHEST SPINE W/O DYE: CPT | Performed by: PHYSICIAN ASSISTANT

## 2018-10-23 PROCEDURE — 95886 MUSC TEST DONE W/N TEST COMP: CPT | Performed by: OTHER

## 2018-10-23 PROCEDURE — 95885 MUSC TST DONE W/NERV TST LIM: CPT | Performed by: OTHER

## 2018-10-23 PROCEDURE — 72148 MRI LUMBAR SPINE W/O DYE: CPT | Performed by: PHYSICIAN ASSISTANT

## 2018-10-23 PROCEDURE — 95911 NRV CNDJ TEST 9-10 STUDIES: CPT | Performed by: OTHER

## 2018-10-23 PROCEDURE — 70551 MRI BRAIN STEM W/O DYE: CPT | Performed by: PHYSICIAN ASSISTANT

## 2018-10-23 NOTE — PROCEDURES
[facility] [employer]  Nerve Conduction & Electromyography Report            Patient: Ny Ferguson  Patient ID: [id number]  YOB: 1970  Age: 57 Years 5 Months  Referring MPILAR: Yanet Franklin M.D.: DO CHRISTIAN Weller EMG Summary Table     Spontaneous MUAP Recruitment    IA Fib PSW Fasc H.F. Amp Dur. PPP Pattern   L.  VAST LATERALIS N None None None None N N N N   L. TIB ANTERIOR N None None None None N N N N   L. GASTROCN (MED) N None None None None N N N N   L. PER

## 2018-10-23 NOTE — PATIENT INSTRUCTIONS
Refill policies:    • Allow 2-3 business days for refills; controlled substances may take longer.   • Contact your pharmacy at least 5 days prior to running out of medication and have them send an electronic request or submit request through the “request re entire amount billed. Precertification and Prior Authorizations: If your physician has recommended that you have a procedure or additional testing performed.   [facility]) will contact your insurance carrier to obtain pre-certi

## 2018-10-29 ENCOUNTER — TELEPHONE (OUTPATIENT)
Dept: NEUROLOGY | Facility: CLINIC | Age: 48
End: 2018-10-29

## 2018-10-29 NOTE — TELEPHONE ENCOUNTER
Per last OV notes, imaging would be reviewed at appointment.  Will have front staff contact patient for an appointment with Dr. Bernardo KELLER

## 2018-10-30 NOTE — TELEPHONE ENCOUNTER
For the leg, the EMG showed a mid level ongoing left lumbar radiculopathy (mid level being near L4/L5). For the left shoulder/neck, the EMG did not show nerve compression or irritation.  From a neurologic standpoint, for the left shoulder/neck, first conser

## 2018-11-01 ENCOUNTER — OFFICE VISIT (OUTPATIENT)
Dept: SURGERY | Facility: CLINIC | Age: 48
End: 2018-11-01
Payer: MEDICAID

## 2018-11-01 VITALS — HEART RATE: 92 BPM | DIASTOLIC BLOOD PRESSURE: 74 MMHG | SYSTOLIC BLOOD PRESSURE: 115 MMHG

## 2018-11-01 DIAGNOSIS — M54.16 LUMBAR RADICULOPATHY: ICD-10-CM

## 2018-11-01 DIAGNOSIS — R07.9 CHEST PAIN OF UNCERTAIN ETIOLOGY: ICD-10-CM

## 2018-11-01 DIAGNOSIS — M62.838 TRAPEZIUS MUSCLE SPASM: Primary | ICD-10-CM

## 2018-11-01 PROCEDURE — 99213 OFFICE O/P EST LOW 20 MIN: CPT | Performed by: PHYSICIAN ASSISTANT

## 2018-11-01 RX ORDER — ORPHENADRINE CITRATE 100 MG/1
100 TABLET, EXTENDED RELEASE ORAL 2 TIMES DAILY PRN
Qty: 60 TABLET | Refills: 1 | Status: SHIPPED | OUTPATIENT
Start: 2018-11-01 | End: 2019-05-29 | Stop reason: ALTCHOICE

## 2018-11-01 RX ORDER — PREGABALIN 75 MG/1
75 CAPSULE ORAL 2 TIMES DAILY
Qty: 60 CAPSULE | Refills: 1 | Status: SHIPPED | OUTPATIENT
Start: 2018-11-01 | End: 2019-03-06

## 2018-11-01 NOTE — PROGRESS NOTES
Pt is here for follow up to review imaging MRI of the brain, cervical, thoracic and lumbar. Pt states that she is worse since the last time we seen her, Pt states that she is having headaches daily.  Pt states that the headaches are located in the front and

## 2018-11-01 NOTE — PROGRESS NOTES
Neurosurgery Clinic Visit  2018    Radha Adam PCP:  Juan Quiels MD    1970 MRN [id number]     HISTORY OF PRESENT ILLNESS:  Radha Adam is a(n) 57year old female who is here for follow-up for multiple issues.   She continues to ha . Recommend she return to neurology for management of her headaches. Recommend PT for her neck and back - referral given. Will stop gabapentin and flexeril and switch to norflex and lyrica - Rxs given.   We discussed trigger point injections for h

## 2018-11-06 ENCOUNTER — TELEPHONE (OUTPATIENT)
Dept: SURGERY | Facility: CLINIC | Age: 48
End: 2018-11-06

## 2018-11-06 DIAGNOSIS — R07.9 CHEST PAIN OF UNCERTAIN ETIOLOGY: ICD-10-CM

## 2018-11-06 DIAGNOSIS — M54.16 LUMBAR RADICULOPATHY: ICD-10-CM

## 2018-11-06 DIAGNOSIS — M62.838 TRAPEZIUS MUSCLE SPASM: Primary | ICD-10-CM

## 2019-01-21 NOTE — TELEPHONE ENCOUNTER
Please call pt for follow up Anxiety with Dr. Qiana Canela prior to refill. Pt was supposed to follow up in 4-6 weeks after start of medication. Thanks.

## 2019-01-29 RX ORDER — VENLAFAXINE HYDROCHLORIDE 75 MG/1
CAPSULE, EXTENDED RELEASE ORAL
Qty: 30 CAPSULE | Refills: 0 | Status: SHIPPED | OUTPATIENT
Start: 2019-01-29 | End: 2019-03-02

## 2019-02-19 ENCOUNTER — LAB ENCOUNTER (OUTPATIENT)
Dept: LAB | Age: 49
End: 2019-02-19
Attending: FAMILY MEDICINE
Payer: MEDICAID

## 2019-02-19 ENCOUNTER — OFFICE VISIT (OUTPATIENT)
Dept: FAMILY MEDICINE CLINIC | Facility: CLINIC | Age: 49
End: 2019-02-19
Payer: MEDICAID

## 2019-02-19 VITALS
SYSTOLIC BLOOD PRESSURE: 98 MMHG | WEIGHT: 110 LBS | RESPIRATION RATE: 16 BRPM | BODY MASS INDEX: 18.78 KG/M2 | TEMPERATURE: 98 F | HEIGHT: 64 IN | DIASTOLIC BLOOD PRESSURE: 58 MMHG | HEART RATE: 97 BPM

## 2019-02-19 DIAGNOSIS — Z98.890 S/P LUMPECTOMY, RIGHT BREAST: ICD-10-CM

## 2019-02-19 DIAGNOSIS — D05.11 DUCTAL CARCINOMA IN SITU (DCIS) OF RIGHT BREAST: ICD-10-CM

## 2019-02-19 DIAGNOSIS — F51.01 PRIMARY INSOMNIA: ICD-10-CM

## 2019-02-19 DIAGNOSIS — F41.9 ANXIETY: ICD-10-CM

## 2019-02-19 DIAGNOSIS — D72.819 LEUKOPENIA, UNSPECIFIED TYPE: Primary | ICD-10-CM

## 2019-02-19 DIAGNOSIS — D72.819 LEUKOPENIA, UNSPECIFIED TYPE: ICD-10-CM

## 2019-02-19 DIAGNOSIS — Z12.31 ENCOUNTER FOR SCREENING MAMMOGRAM FOR BREAST CANCER: ICD-10-CM

## 2019-02-19 LAB
BASOPHILS # BLD AUTO: 0.04 X10(3) UL (ref 0–0.2)
BASOPHILS NFR BLD AUTO: 1.1 %
DEPRECATED RDW RBC AUTO: 41.1 FL (ref 35.1–46.3)
EOSINOPHIL # BLD AUTO: 0.05 X10(3) UL (ref 0–0.7)
EOSINOPHIL NFR BLD AUTO: 1.4 %
ERYTHROCYTE [DISTWIDTH] IN BLOOD BY AUTOMATED COUNT: 12.6 % (ref 11–15)
HCT VFR BLD AUTO: 41.4 % (ref 35–48)
HGB BLD-MCNC: 13.2 G/DL (ref 12–16)
IMM GRANULOCYTES # BLD AUTO: 0 X10(3) UL (ref 0–1)
IMM GRANULOCYTES NFR BLD: 0 %
LYMPHOCYTES # BLD AUTO: 1.77 X10(3) UL (ref 1–4)
LYMPHOCYTES NFR BLD AUTO: 48.2 %
MCH RBC QN AUTO: 28.3 PG (ref 26–34)
MCHC RBC AUTO-ENTMCNC: 31.9 G/DL (ref 31–37)
MCV RBC AUTO: 88.8 FL (ref 80–100)
MONOCYTES # BLD AUTO: 0.5 X10(3) UL (ref 0.1–1)
MONOCYTES NFR BLD AUTO: 13.6 %
NEUTROPHILS # BLD AUTO: 1.31 X10 (3) UL (ref 1.5–7.7)
NEUTROPHILS # BLD AUTO: 1.31 X10(3) UL (ref 1.5–7.7)
NEUTROPHILS NFR BLD AUTO: 35.7 %
PLATELET # BLD AUTO: 322 10(3)UL (ref 150–450)
RBC # BLD AUTO: 4.66 X10(6)UL (ref 3.8–5.3)
WBC # BLD AUTO: 3.7 X10(3) UL (ref 4–11)

## 2019-02-19 PROCEDURE — 85025 COMPLETE CBC W/AUTO DIFF WBC: CPT

## 2019-02-19 PROCEDURE — 36415 COLL VENOUS BLD VENIPUNCTURE: CPT

## 2019-02-19 PROCEDURE — 99214 OFFICE O/P EST MOD 30 MIN: CPT | Performed by: FAMILY MEDICINE

## 2019-02-19 RX ORDER — ZOLPIDEM TARTRATE 5 MG/1
5 TABLET ORAL NIGHTLY PRN
Qty: 30 TABLET | Refills: 0 | Status: SHIPPED | OUTPATIENT
Start: 2019-02-19 | End: 2021-03-02

## 2019-02-19 RX ORDER — ALPRAZOLAM 0.5 MG/1
TABLET ORAL
Qty: 30 TABLET | Refills: 2 | Status: SHIPPED | OUTPATIENT
Start: 2019-02-19 | End: 2019-10-07

## 2019-02-19 NOTE — PROGRESS NOTES
Tavia Ramirez is a 57year old female. cc anxiety, insomnia, leukopenia, DCIS status post lumpectomy breast  HPI:   Patient is coming to discuss anxiety she is using Xanax 0.5 mg only as needed. She would like prescription of this medication to continue. 2015   • Headache(784.0)    • Hiatal hernia    • Insomnia, unspecified    • Intestinal infection due to Clostridium difficile    • Multinodular goiter    • Vaginitis and vulvovaginitis, unspecified       Social History:  Social History    Tobacco Use ALPRAZolam 0.5 MG Oral Tab 30 tablet 2     Sig: TAKE 1 TABLET BY MOUTH DAILY AS NEEDED. Call [phone] to schedule Mammogram.  Schedule appointment with Dr. Rudi Moran for evaluation. Schedule appointment with Dr. Mimi Landaverde.   We will call you blood test res

## 2019-02-19 NOTE — PATIENT INSTRUCTIONS
Call [phone] to schedule Mammogram.  Schedule appointment with Dr. Melvin Hernandez for evaluation. Schedule appointment with Dr. Henrique Bhatti. We will call you blood test results when those are back. Use Ambien as needed for sleep at night.   Use alprazolam as neede

## 2019-03-04 RX ORDER — VENLAFAXINE HYDROCHLORIDE 75 MG/1
CAPSULE, EXTENDED RELEASE ORAL
Qty: 30 CAPSULE | Refills: 0 | Status: SHIPPED | OUTPATIENT
Start: 2019-03-04 | End: 2019-04-09

## 2019-03-04 NOTE — TELEPHONE ENCOUNTER
VENLAFAXINE ER 75MG CAPSULES    Non protocol medication. Please see pended medications. Please sign if appropriate. Thank you    The pt last refill was sent on 01/29/2019 for 30 tabs and 0 refills. The pt has an upcoming appt on 03/20/2019.

## 2019-03-06 ENCOUNTER — OFFICE VISIT (OUTPATIENT)
Dept: HEMATOLOGY/ONCOLOGY | Facility: HOSPITAL | Age: 49
End: 2019-03-06
Attending: INTERNAL MEDICINE
Payer: MEDICAID

## 2019-03-06 ENCOUNTER — HOSPITAL ENCOUNTER (OUTPATIENT)
Dept: MAMMOGRAPHY | Facility: HOSPITAL | Age: 49
Discharge: HOME | End: 2019-03-06
Attending: FAMILY MEDICINE
Payer: MEDICAID

## 2019-03-06 VITALS
HEIGHT: 64.02 IN | BODY MASS INDEX: 19.12 KG/M2 | SYSTOLIC BLOOD PRESSURE: 106 MMHG | HEART RATE: 88 BPM | OXYGEN SATURATION: 99 % | RESPIRATION RATE: 18 BRPM | DIASTOLIC BLOOD PRESSURE: 69 MMHG | TEMPERATURE: 98 F | WEIGHT: 112 LBS

## 2019-03-06 DIAGNOSIS — D70.8 OTHER NEUTROPENIA (HCC): ICD-10-CM

## 2019-03-06 DIAGNOSIS — D05.11 DUCTAL CARCINOMA IN SITU (DCIS) OF RIGHT BREAST: ICD-10-CM

## 2019-03-06 DIAGNOSIS — Z98.890 S/P LUMPECTOMY, RIGHT BREAST: ICD-10-CM

## 2019-03-06 DIAGNOSIS — N92.1 MENORRHAGIA WITH IRREGULAR CYCLE: ICD-10-CM

## 2019-03-06 DIAGNOSIS — Z12.31 ENCOUNTER FOR SCREENING MAMMOGRAM FOR BREAST CANCER: ICD-10-CM

## 2019-03-06 DIAGNOSIS — D05.11 DUCTAL CARCINOMA IN SITU (DCIS) OF RIGHT BREAST: Primary | ICD-10-CM

## 2019-03-06 LAB
DEPRECATED HBV CORE AB SER IA-ACNC: 5.9 NG/ML (ref 12–240)
FOLATE SERPL-MCNC: 6.1 NG/ML (ref 8.7–?)
IRON SATURATION: 15 % (ref 15–50)
IRON SERPL-MCNC: 60 UG/DL (ref 50–170)
TOTAL IRON BINDING CAPACITY: 410 UG/DL (ref 240–450)
TRANSFERRIN SERPL-MCNC: 275 MG/DL (ref 200–360)

## 2019-03-06 PROCEDURE — 77062 BREAST TOMOSYNTHESIS BI: CPT | Performed by: FAMILY MEDICINE

## 2019-03-06 PROCEDURE — 76641 ULTRASOUND BREAST COMPLETE: CPT | Performed by: FAMILY MEDICINE

## 2019-03-06 PROCEDURE — 99215 OFFICE O/P EST HI 40 MIN: CPT | Performed by: INTERNAL MEDICINE

## 2019-03-06 PROCEDURE — 77066 DX MAMMO INCL CAD BI: CPT | Performed by: FAMILY MEDICINE

## 2019-03-06 NOTE — PROGRESS NOTES
Cancer Center Progress Note    Problem List:      Patient Active Problem List:     Dysmenorrhea     Chest pain, unspecified     Insomnia, unspecified     Acute pharyngitis     Lumbago     Headache(784.0)     Leukocytopenia     Umbilical hernia without ment for Sleep. Disp: 30 tablet Rfl: 0   ALPRAZolam 0.5 MG Oral Tab TAKE 1 TABLET BY MOUTH DAILY AS NEEDED. Disp: 30 tablet Rfl: 2   Orphenadrine Citrate  MG Oral Tablet 12 Hr Take 100 mg by mouth 2 (two) times daily as needed.  Disp: 60 tablet Rfl: 1   Mu 09/02/2018    ALB 3.7 09/02/2018    TP 6.7 09/02/2018       Ref Range & Units 3/6/19 1552   Ferritin 12.0 - 240.0 ng/mL 5.9 Abnormally low       Ref Range & Units 3/6/19 8006   Folate (Folic Acid) >=9.0 ng/mL 6.1 Abnormally low         Impression:     Duct

## 2019-03-07 PROBLEM — E53.8 FOLIC ACID DEFICIENCY: Status: ACTIVE | Noted: 2019-03-07

## 2019-03-07 LAB — VIT B12 SERPL-MCNC: 315 PG/ML (ref 193–986)

## 2019-03-07 RX ORDER — FOLIC ACID 1 MG/1
1 TABLET ORAL DAILY
Qty: 90 TABLET | Refills: 3 | Status: SHIPPED | OUTPATIENT
Start: 2019-03-07 | End: 2021-03-02

## 2019-03-13 ENCOUNTER — OFFICE VISIT (OUTPATIENT)
Dept: HEMATOLOGY/ONCOLOGY | Facility: HOSPITAL | Age: 49
End: 2019-03-13
Attending: INTERNAL MEDICINE
Payer: MEDICAID

## 2019-03-13 VITALS
OXYGEN SATURATION: 99 % | WEIGHT: 111.19 LBS | RESPIRATION RATE: 16 BRPM | BODY MASS INDEX: 18.98 KG/M2 | HEIGHT: 64.02 IN | SYSTOLIC BLOOD PRESSURE: 100 MMHG | HEART RATE: 74 BPM | DIASTOLIC BLOOD PRESSURE: 65 MMHG | TEMPERATURE: 99 F

## 2019-03-13 DIAGNOSIS — E53.8 FOLIC ACID DEFICIENCY: ICD-10-CM

## 2019-03-13 DIAGNOSIS — D05.11 DUCTAL CARCINOMA IN SITU (DCIS) OF RIGHT BREAST: Primary | ICD-10-CM

## 2019-03-13 DIAGNOSIS — D50.0 IRON DEFICIENCY ANEMIA DUE TO CHRONIC BLOOD LOSS: ICD-10-CM

## 2019-03-13 PROCEDURE — 96365 THER/PROPH/DIAG IV INF INIT: CPT

## 2019-03-13 NOTE — PROGRESS NOTES
Education Record    Learner:  Patient    Disease / Conchis Lam deficiency    Barriers / Limitations:  None   Comments:    Method:  Discussion   Comments:    General Topics:  Medication, Procedure, Side effects and symptom management and Plan of care rev

## 2019-03-20 ENCOUNTER — OFFICE VISIT (OUTPATIENT)
Dept: SURGERY | Facility: CLINIC | Age: 49
End: 2019-03-20
Payer: MEDICAID

## 2019-03-20 VITALS
SYSTOLIC BLOOD PRESSURE: 103 MMHG | BODY MASS INDEX: 18.61 KG/M2 | TEMPERATURE: 98 F | HEART RATE: 92 BPM | WEIGHT: 109 LBS | DIASTOLIC BLOOD PRESSURE: 77 MMHG | HEIGHT: 64 IN

## 2019-03-20 DIAGNOSIS — D05.11 DUCTAL CARCINOMA IN SITU (DCIS) OF RIGHT BREAST: Primary | ICD-10-CM

## 2019-03-20 PROCEDURE — 99213 OFFICE O/P EST LOW 20 MIN: CPT | Performed by: SURGERY

## 2019-03-20 NOTE — PROGRESS NOTES
Follow Up Visit Note       Active Problems      1. Ductal carcinoma in situ (DCIS) of right breast          Chief Complaint   Patient presents with:   Follow Up To Mammogram: pt had mammogram and ultrasound done 3/6/19        History of Present Illness  The Social History    Socioeconomic History      Marital status:       Spouse name: Not on file      Number of children: 1      Years of education: Not on file      Highest education level: Not on file    Occupational History      Occupation: harist nosebleeds, sore throat and trouble swallowing. Respiratory: Negative for apnea, cough, shortness of breath and wheezing. Cardiovascular: Negative for chest pain, palpitations and leg swelling.    Gastrointestinal: Negative for abdominal distention, a Skin: Skin is intact. She is not diaphoretic. Psychiatric: She has a normal mood and affect. Her speech is normal and behavior is normal.   Nursing note and vitals reviewed.       MAMMOGRAM   I personally viewed the films and agree with the radiologist' DIAGNOSTIC JT  BILAT (ONQ=[id number])    Follow Up  Return in about 1 year (around 3/20/2020).     Steven Dye MD

## 2019-04-12 RX ORDER — VENLAFAXINE HYDROCHLORIDE 75 MG/1
CAPSULE, EXTENDED RELEASE ORAL
Qty: 30 CAPSULE | Refills: 1 | Status: SHIPPED | OUTPATIENT
Start: 2019-04-12 | End: 2019-06-08

## 2019-04-12 NOTE — TELEPHONE ENCOUNTER
VENLAFAXINE ER 75MG CAPSULES    Non protocol medication. Please see pended medications. Please sign if appropriate. Thank you    Her last OV was on 02/19/2019.

## 2019-05-29 ENCOUNTER — OFFICE VISIT (OUTPATIENT)
Dept: FAMILY MEDICINE CLINIC | Facility: CLINIC | Age: 49
End: 2019-05-29
Payer: MEDICAID

## 2019-05-29 VITALS
DIASTOLIC BLOOD PRESSURE: 52 MMHG | TEMPERATURE: 98 F | HEIGHT: 64 IN | OXYGEN SATURATION: 99 % | RESPIRATION RATE: 16 BRPM | HEART RATE: 81 BPM | SYSTOLIC BLOOD PRESSURE: 108 MMHG | WEIGHT: 115 LBS | BODY MASS INDEX: 19.63 KG/M2

## 2019-05-29 DIAGNOSIS — Z12.4 SCREENING FOR MALIGNANT NEOPLASM OF CERVIX: ICD-10-CM

## 2019-05-29 DIAGNOSIS — Z00.00 PHYSICAL EXAM, ANNUAL: Primary | ICD-10-CM

## 2019-05-29 DIAGNOSIS — E55.9 VITAMIN D DEFICIENCY: ICD-10-CM

## 2019-05-29 DIAGNOSIS — Z13.89 SCREENING FOR GENITOURINARY CONDITION: ICD-10-CM

## 2019-05-29 DIAGNOSIS — Z00.00 LABORATORY EXAMINATION ORDERED AS PART OF A ROUTINE GENERAL MEDICAL EXAMINATION: ICD-10-CM

## 2019-05-29 PROCEDURE — 90471 IMMUNIZATION ADMIN: CPT | Performed by: FAMILY MEDICINE

## 2019-05-29 PROCEDURE — 90715 TDAP VACCINE 7 YRS/> IM: CPT | Performed by: FAMILY MEDICINE

## 2019-05-29 PROCEDURE — 87624 HPV HI-RISK TYP POOLED RSLT: CPT | Performed by: FAMILY MEDICINE

## 2019-05-29 PROCEDURE — 99396 PREV VISIT EST AGE 40-64: CPT | Performed by: FAMILY MEDICINE

## 2019-05-29 PROCEDURE — 88175 CYTOPATH C/V AUTO FLUID REDO: CPT | Performed by: FAMILY MEDICINE

## 2019-05-29 PROCEDURE — 81003 URINALYSIS AUTO W/O SCOPE: CPT | Performed by: FAMILY MEDICINE

## 2019-05-30 NOTE — PROGRESS NOTES
HPI:   Dina Monte is a 54year old female who presents for a complete physical exam. Symptoms: denies discharge, itching, burning or dysuria.  Patient complains of having some problems with her breast after lumpectomy for breast cancer she is looking fo disease), lumbar    • Ductal carcinoma in situ of breast 2015   • Headache(784.0)    • Hiatal hernia    • Insomnia, unspecified    • Intestinal infection due to Clostridium difficile    • Multinodular goiter    • Vaginitis and vulvovaginitis, unspecified Sitting, Cuff Size: adult)   Pulse 81   Temp 98.3 °F (36.8 °C) (Oral)   Resp 16   Ht 64\"   Wt 115 lb   LMP 05/07/2019 (Approximate)   SpO2 99%   Breastfeeding? No   BMI 19.74 kg/m²   Body mass index is 19.74 kg/m².    GENERAL: well developed, well nourishe mammogram and dexascan. Self breast exam explained. Health maintenance, will check fasting Lipids, CMP, and CBC. Pt will be at 50 referred for screening colonoscopy. Pt' s weight is Body mass index is 19.74 kg/m². , recommended low carb diet and aerobic exe

## 2019-06-04 ENCOUNTER — LAB ENCOUNTER (OUTPATIENT)
Dept: LAB | Age: 49
End: 2019-06-04
Attending: FAMILY MEDICINE
Payer: MEDICAID

## 2019-06-04 DIAGNOSIS — D05.11 DUCTAL CARCINOMA IN SITU (DCIS) OF RIGHT BREAST: ICD-10-CM

## 2019-06-04 DIAGNOSIS — Z00.00 LABORATORY EXAMINATION ORDERED AS PART OF A ROUTINE GENERAL MEDICAL EXAMINATION: ICD-10-CM

## 2019-06-04 DIAGNOSIS — Z13.89 SCREENING FOR GENITOURINARY CONDITION: ICD-10-CM

## 2019-06-04 DIAGNOSIS — E55.9 VITAMIN D DEFICIENCY: ICD-10-CM

## 2019-06-04 DIAGNOSIS — D70.8 OTHER NEUTROPENIA (HCC): ICD-10-CM

## 2019-06-04 DIAGNOSIS — N92.1 MENORRHAGIA WITH IRREGULAR CYCLE: ICD-10-CM

## 2019-06-04 PROCEDURE — 82607 VITAMIN B-12: CPT

## 2019-06-04 PROCEDURE — 85025 COMPLETE CBC W/AUTO DIFF WBC: CPT

## 2019-06-04 PROCEDURE — 84443 ASSAY THYROID STIM HORMONE: CPT

## 2019-06-04 PROCEDURE — 36415 COLL VENOUS BLD VENIPUNCTURE: CPT

## 2019-06-04 PROCEDURE — 80053 COMPREHEN METABOLIC PANEL: CPT

## 2019-06-04 PROCEDURE — 82728 ASSAY OF FERRITIN: CPT

## 2019-06-04 PROCEDURE — 82746 ASSAY OF FOLIC ACID SERUM: CPT

## 2019-06-04 PROCEDURE — 80061 LIPID PANEL: CPT

## 2019-06-04 PROCEDURE — 81003 URINALYSIS AUTO W/O SCOPE: CPT

## 2019-06-04 PROCEDURE — 82306 VITAMIN D 25 HYDROXY: CPT

## 2019-06-05 ENCOUNTER — TELEPHONE (OUTPATIENT)
Dept: HEMATOLOGY/ONCOLOGY | Facility: HOSPITAL | Age: 49
End: 2019-06-05

## 2019-06-05 NOTE — TELEPHONE ENCOUNTER
Patient was informed that folate and iron studies were improving and she still has neutropenia. She was instructed to make an appointment to see Dr Zoran Pelaez.  Pt verbalizes understanding

## 2019-06-10 NOTE — TELEPHONE ENCOUNTER
VENLAFAXINE ER 75MG CAPSULES    Non protocol medication. Please see pended medications. Please sign if appropriate. Thank you    Her last OV was on 02/19/2019. Which was a medication follow up.

## 2019-06-11 RX ORDER — VENLAFAXINE HYDROCHLORIDE 75 MG/1
CAPSULE, EXTENDED RELEASE ORAL
Qty: 30 CAPSULE | Refills: 1 | Status: SHIPPED | OUTPATIENT
Start: 2019-06-11 | End: 2020-01-23

## 2019-06-24 RX ORDER — VENLAFAXINE HYDROCHLORIDE 75 MG/1
CAPSULE, EXTENDED RELEASE ORAL
Qty: 30 CAPSULE | Refills: 0 | OUTPATIENT
Start: 2019-06-24

## 2019-07-10 RX ORDER — VENLAFAXINE HYDROCHLORIDE 75 MG/1
CAPSULE, EXTENDED RELEASE ORAL
Qty: 30 CAPSULE | Refills: 2 | Status: SHIPPED | OUTPATIENT
Start: 2019-07-10 | End: 2019-11-12

## 2019-07-10 NOTE — TELEPHONE ENCOUNTER
VENLAFAXINE ER 75MG CAPSULES    Non protocol medication. Please see pended medications. Please sign If appropriate. Thank you    The pt last Medication OV was on 02/19/19.

## 2019-09-13 ENCOUNTER — TELEPHONE (OUTPATIENT)
Dept: SURGERY | Facility: CLINIC | Age: 49
End: 2019-09-13

## 2019-09-13 NOTE — TELEPHONE ENCOUNTER
[facility] medical records request for a complete medical file. I completed a form and put in the SCAN STAT bag.

## 2019-10-03 ENCOUNTER — TELEPHONE (OUTPATIENT)
Dept: FAMILY MEDICINE CLINIC | Facility: CLINIC | Age: 49
End: 2019-10-03

## 2019-10-03 NOTE — TELEPHONE ENCOUNTER
Medical Records Request received from [facility] for records from 1/1/14 to present. Records to be sent to:     Attn: [address]  [address], [address]  [facility], [facility]     Release sent to [employer] on 10/3/19.   Release

## 2019-10-07 DIAGNOSIS — F41.9 ANXIETY: ICD-10-CM

## 2019-10-08 NOTE — Clinical Note
[facility], [facility]  [address] [facility]  [place], [address]       04/10/2017        Patient: Lindsay Sargent   YOB: 1970   Date of Visit: 4/10/2017       Dear  Dr. Ashley Berry MD,      Thank you for referring Lindsay Sargent t Patient call and would like to know the results of the biopsy.   They understand that she may best be served by continued care from the medical team that knows her best.  Await MRI. Will discuss with Plastics thereafter.             Sincerely,   Wade Tenorio MD   [facility]

## 2019-10-09 RX ORDER — ALPRAZOLAM 0.5 MG/1
TABLET ORAL
Qty: 30 TABLET | Refills: 0 | Status: SHIPPED | OUTPATIENT
Start: 2019-10-09 | End: 2021-03-02

## 2019-10-09 NOTE — TELEPHONE ENCOUNTER
Not protocol medication  LOV: 5/29/19 physical asked to return yearly    Alprazolam  Last refill: 2/19/19, 30 tabs 2 refills    Please refill if appropriate. Thank you.

## 2019-10-12 ENCOUNTER — HOSPITAL ENCOUNTER (OUTPATIENT)
Age: 49
Discharge: HOME | End: 2019-10-12
Attending: FAMILY MEDICINE
Payer: MEDICAID

## 2019-10-12 VITALS
HEIGHT: 64 IN | BODY MASS INDEX: 19.63 KG/M2 | WEIGHT: 115 LBS | SYSTOLIC BLOOD PRESSURE: 116 MMHG | RESPIRATION RATE: 18 BRPM | HEART RATE: 92 BPM | DIASTOLIC BLOOD PRESSURE: 87 MMHG | OXYGEN SATURATION: 100 %

## 2019-10-12 DIAGNOSIS — J30.2 SEASONAL ALLERGIC RHINITIS, UNSPECIFIED TRIGGER: ICD-10-CM

## 2019-10-12 DIAGNOSIS — R51.9 SINUS HEADACHE: Primary | ICD-10-CM

## 2019-10-12 DIAGNOSIS — H69.83 ACUTE DYSFUNCTION OF EUSTACHIAN TUBE, BILATERAL: ICD-10-CM

## 2019-10-12 PROCEDURE — 99204 OFFICE O/P NEW MOD 45 MIN: CPT

## 2019-10-12 PROCEDURE — 99214 OFFICE O/P EST MOD 30 MIN: CPT

## 2019-10-12 PROCEDURE — 96372 THER/PROPH/DIAG INJ SC/IM: CPT

## 2019-10-12 RX ORDER — FLUTICASONE PROPIONATE 50 MCG
SPRAY, SUSPENSION (ML) NASAL
Qty: 1 INHALER | Refills: 0 | Status: SHIPPED | OUTPATIENT
Start: 2019-10-12 | End: 2021-03-02 | Stop reason: ALTCHOICE

## 2019-10-12 RX ORDER — KETOROLAC TROMETHAMINE 30 MG/ML
60 INJECTION, SOLUTION INTRAMUSCULAR; INTRAVENOUS ONCE
Status: COMPLETED | OUTPATIENT
Start: 2019-10-12 | End: 2019-10-12

## 2019-10-12 NOTE — ED PROVIDER NOTES
Patient Seen in: [facility]      History   Patient presents with:  Headache (neurologic)    Stated Complaint: HP/RINGING IN EARS X 2 DAYS    HPI    This 73-year-old female presents to the office with complaint of headache last 3 HP/RINGING IN EARS X 2 DAYS  Other systems are as noted in HPI. Constitutional and vital signs reviewed. All other systems reviewed and negative except as noted above.     Physical Exam     ED Triage Vitals [10/12/19 1347]   /87   Pulse 92   Res reviewed. She should go the emergency room for any worsening symptoms. She is to follow-up with her primary doctor in 3 to 5 days for any new symptoms.               Disposition and Plan     Clinical Impression:  Sinus headache  (primary encounter diagnos

## 2019-10-15 ENCOUNTER — TELEPHONE (OUTPATIENT)
Dept: FAMILY MEDICINE CLINIC | Facility: CLINIC | Age: 49
End: 2019-10-15

## 2019-10-15 NOTE — TELEPHONE ENCOUNTER
Dr. Yoo Spine notified. Per Dr. Yoo Spine if headcahe is not better, patient to go to ER for evaluation, no medication in office to headaches. Information and recommendations discussed with patient, voiced understanidng, agreed with plan.

## 2019-10-15 NOTE — TELEPHONE ENCOUNTER
Pt called and stated she was seen at urgent care on 10/12/19 being seen for a headache.  Pt stated she was given an injection and prescribed     Fluticasone Propionate 50 MCG/ACT Nasal Suspension 1 Inhaler     She was told to follow up with pcp if not getti

## 2019-10-16 ENCOUNTER — MED REC SCAN ONLY (OUTPATIENT)
Dept: FAMILY MEDICINE CLINIC | Facility: CLINIC | Age: 49
End: 2019-10-16

## 2019-11-13 RX ORDER — VENLAFAXINE HYDROCHLORIDE 75 MG/1
75 CAPSULE, EXTENDED RELEASE ORAL DAILY
Qty: 30 CAPSULE | Refills: 0 | Status: SHIPPED | OUTPATIENT
Start: 2019-11-13 | End: 2019-12-16

## 2019-11-26 ENCOUNTER — MED REC SCAN ONLY (OUTPATIENT)
Dept: FAMILY MEDICINE CLINIC | Facility: CLINIC | Age: 49
End: 2019-11-26

## 2019-12-06 ENCOUNTER — APPOINTMENT (OUTPATIENT)
Dept: HEMATOLOGY/ONCOLOGY | Facility: HOSPITAL | Age: 49
End: 2019-12-06
Attending: SURGERY
Payer: MEDICAID

## 2019-12-09 ENCOUNTER — NURSE ONLY (OUTPATIENT)
Dept: HEMATOLOGY/ONCOLOGY | Facility: HOSPITAL | Age: 49
End: 2019-12-09
Attending: INTERNAL MEDICINE
Payer: MEDICAID

## 2019-12-09 DIAGNOSIS — D50.0 IRON DEFICIENCY ANEMIA DUE TO CHRONIC BLOOD LOSS: Primary | ICD-10-CM

## 2019-12-09 PROCEDURE — 36415 COLL VENOUS BLD VENIPUNCTURE: CPT

## 2019-12-09 PROCEDURE — 85025 COMPLETE CBC W/AUTO DIFF WBC: CPT

## 2019-12-09 PROCEDURE — 82728 ASSAY OF FERRITIN: CPT

## 2019-12-16 ENCOUNTER — OFFICE VISIT (OUTPATIENT)
Dept: HEMATOLOGY/ONCOLOGY | Facility: HOSPITAL | Age: 49
End: 2019-12-16
Attending: INTERNAL MEDICINE
Payer: MEDICAID

## 2019-12-16 VITALS
OXYGEN SATURATION: 97 % | HEART RATE: 91 BPM | WEIGHT: 117 LBS | SYSTOLIC BLOOD PRESSURE: 113 MMHG | RESPIRATION RATE: 16 BRPM | BODY MASS INDEX: 19.97 KG/M2 | TEMPERATURE: 99 F | DIASTOLIC BLOOD PRESSURE: 68 MMHG | HEIGHT: 64 IN

## 2019-12-16 DIAGNOSIS — D50.0 IRON DEFICIENCY ANEMIA DUE TO CHRONIC BLOOD LOSS: Primary | ICD-10-CM

## 2019-12-16 DIAGNOSIS — D05.11 DUCTAL CARCINOMA IN SITU (DCIS) OF RIGHT BREAST: ICD-10-CM

## 2019-12-16 PROCEDURE — 99213 OFFICE O/P EST LOW 20 MIN: CPT | Performed by: INTERNAL MEDICINE

## 2019-12-16 RX ORDER — VENLAFAXINE HYDROCHLORIDE 75 MG/1
CAPSULE, EXTENDED RELEASE ORAL
Qty: 30 CAPSULE | Refills: 0 | Status: SHIPPED | OUTPATIENT
Start: 2019-12-16 | End: 2020-01-16

## 2019-12-16 NOTE — PROGRESS NOTES
Cancer Center Progress Note    Problem List:      Patient Active Problem List:     Dysmenorrhea     Chest pain, unspecified     Insomnia, unspecified     Acute pharyngitis     Lumbago     Headache(784.0)     Leukocytopenia     Umbilical hernia without ment pain.  Integument/breast: Negative for rash, skin lesions, and pruritus. Hematologic/lymphatic: Negative for easy bruising, bleeding, and lymphadenopathy. Musculoskeletal: Negative for myalgias, arthralgias, muscle weakness.   Genitourinary: Negative for DAILY AS NEEDED.   Please schedule medication check., Disp: 30 tablet, Rfl: 0  VENLAFAXINE HCL ER 75 MG Oral Capsule SR 24 Hr, TAKE 1 CAPSULE(75 MG) BY MOUTH DAILY, Disp: 30 capsule, Rfl: 1  folic acid 1 MG Oral Tab, Take 1 tablet (1 mg total) by mouth claudia 10.1 (H) 08/19/2011     Lab Results   Component Value Date     06/04/2019    K 3.8 06/04/2019     06/04/2019    CO2 26.0 06/04/2019    BUN 8 06/04/2019    CREATSERUM 0.61 06/04/2019    GLU 74 06/04/2019    CA 9.2 06/04/2019    ALKPHO 50 06/04/2 menorrhagia which is likely the source of her blood loss. I will see her in six months with labs.     Transient neutropenia with folate deficiency:     Her repeat WBC is normal. Her folate level remains low. The B12 is normal. Continue folate 1 mg daily.

## 2019-12-16 NOTE — TELEPHONE ENCOUNTER
VENLAFAXINE ER 75MG CAPSULES    Non protocol medication. The pt scheduled an upcoming appt for 01/08/2020. Please see pended medications. Please sign if appropriate.       Thank you

## 2020-01-16 ENCOUNTER — APPOINTMENT (OUTPATIENT)
Dept: CT IMAGING | Facility: HOSPITAL | Age: 50
End: 2020-01-16
Attending: EMERGENCY MEDICINE
Payer: MEDICAID

## 2020-01-16 ENCOUNTER — APPOINTMENT (OUTPATIENT)
Dept: GENERAL RADIOLOGY | Facility: HOSPITAL | Age: 50
End: 2020-01-16
Attending: EMERGENCY MEDICINE
Payer: MEDICAID

## 2020-01-16 ENCOUNTER — HOSPITAL ENCOUNTER (EMERGENCY)
Facility: HOSPITAL | Age: 50
Discharge: HOME | End: 2020-01-16
Attending: EMERGENCY MEDICINE
Payer: MEDICAID

## 2020-01-16 VITALS
RESPIRATION RATE: 20 BRPM | TEMPERATURE: 98 F | SYSTOLIC BLOOD PRESSURE: 104 MMHG | DIASTOLIC BLOOD PRESSURE: 79 MMHG | OXYGEN SATURATION: 100 % | HEART RATE: 77 BPM | HEIGHT: 64 IN | WEIGHT: 115 LBS | BODY MASS INDEX: 19.63 KG/M2

## 2020-01-16 DIAGNOSIS — J06.9 VIRAL URI: ICD-10-CM

## 2020-01-16 DIAGNOSIS — R07.89 CHEST PAIN, ATYPICAL: Primary | ICD-10-CM

## 2020-01-16 LAB
ALBUMIN SERPL-MCNC: 3.7 G/DL (ref 3.4–5)
ALBUMIN/GLOB SERPL: 1 {RATIO} (ref 1–2)
ALP LIVER SERPL-CCNC: 54 U/L (ref 39–100)
ALT SERPL-CCNC: 24 U/L (ref 13–56)
ANION GAP SERPL CALC-SCNC: 6 MMOL/L (ref 0–18)
APTT PPP: 29.3 SECONDS (ref 25.4–36.1)
AST SERPL-CCNC: 18 U/L (ref 15–37)
BASOPHILS # BLD AUTO: 0.03 X10(3) UL (ref 0–0.2)
BASOPHILS NFR BLD AUTO: 0.8 %
BILIRUB SERPL-MCNC: 0.4 MG/DL (ref 0.1–2)
BILIRUB UR QL STRIP.AUTO: NEGATIVE
BUN BLD-MCNC: 8 MG/DL (ref 7–18)
BUN/CREAT SERPL: 13.6 (ref 10–20)
CALCIUM BLD-MCNC: 9 MG/DL (ref 8.5–10.1)
CHLORIDE SERPL-SCNC: 104 MMOL/L (ref 98–112)
CLARITY UR REFRACT.AUTO: CLEAR
CO2 SERPL-SCNC: 28 MMOL/L (ref 21–32)
CREAT BLD-MCNC: 0.59 MG/DL (ref 0.55–1.02)
DEPRECATED RDW RBC AUTO: 40.4 FL (ref 35.1–46.3)
EOSINOPHIL # BLD AUTO: 0.1 X10(3) UL (ref 0–0.7)
EOSINOPHIL NFR BLD AUTO: 2.8 %
ERYTHROCYTE [DISTWIDTH] IN BLOOD BY AUTOMATED COUNT: 12.2 % (ref 11–15)
GLOBULIN PLAS-MCNC: 3.7 G/DL (ref 2.8–4.4)
GLUCOSE BLD-MCNC: 99 MG/DL (ref 70–99)
GLUCOSE UR STRIP.AUTO-MCNC: NEGATIVE MG/DL
HCT VFR BLD AUTO: 42.4 % (ref 35–48)
HGB BLD-MCNC: 14 G/DL (ref 12–16)
IMM GRANULOCYTES # BLD AUTO: 0.01 X10(3) UL (ref 0–1)
IMM GRANULOCYTES NFR BLD: 0.3 %
INR BLD: 0.96 (ref 0.9–1.1)
KETONES UR STRIP.AUTO-MCNC: NEGATIVE MG/DL
LEUKOCYTE ESTERASE UR QL STRIP.AUTO: NEGATIVE
LYMPHOCYTES # BLD AUTO: 1.84 X10(3) UL (ref 1–4)
LYMPHOCYTES NFR BLD AUTO: 50.7 %
M PROTEIN MFR SERPL ELPH: 7.4 G/DL (ref 6.4–8.2)
MCH RBC QN AUTO: 29.5 PG (ref 26–34)
MCHC RBC AUTO-ENTMCNC: 33 G/DL (ref 31–37)
MCV RBC AUTO: 89.5 FL (ref 80–100)
MONOCYTES # BLD AUTO: 0.4 X10(3) UL (ref 0.1–1)
MONOCYTES NFR BLD AUTO: 11 %
NEUTROPHILS # BLD AUTO: 1.25 X10 (3) UL (ref 1.5–7.7)
NEUTROPHILS # BLD AUTO: 1.25 X10(3) UL (ref 1.5–7.7)
NEUTROPHILS NFR BLD AUTO: 34.4 %
NITRITE UR QL STRIP.AUTO: NEGATIVE
NT-PROBNP SERPL-MCNC: 43 PG/ML (ref ?–125)
OSMOLALITY SERPL CALC.SUM OF ELEC: 284 MOSM/KG (ref 275–295)
PH UR STRIP.AUTO: 9 [PH] (ref 4.5–8)
PLATELET # BLD AUTO: 297 10(3)UL (ref 150–450)
POCT LOT NUMBER: NORMAL
POCT URINE PREGNANCY: NEGATIVE
POTASSIUM SERPL-SCNC: 3.8 MMOL/L (ref 3.5–5.1)
PROT UR STRIP.AUTO-MCNC: NEGATIVE MG/DL
PSA SERPL DL<=0.01 NG/ML-MCNC: 13.2 SECONDS (ref 12.5–14.7)
RBC # BLD AUTO: 4.74 X10(6)UL (ref 3.8–5.3)
RBC UR QL AUTO: NEGATIVE
SODIUM SERPL-SCNC: 138 MMOL/L (ref 136–145)
SP GR UR STRIP.AUTO: <1.005 (ref 1–1.03)
TROPONIN I SERPL-MCNC: <0.045 NG/ML (ref ?–0.04)
TROPONIN I SERPL-MCNC: <0.045 NG/ML (ref ?–0.04)
UROBILINOGEN UR STRIP.AUTO-MCNC: <2 MG/DL
WBC # BLD AUTO: 3.6 X10(3) UL (ref 4–11)

## 2020-01-16 PROCEDURE — 93005 ELECTROCARDIOGRAM TRACING: CPT

## 2020-01-16 PROCEDURE — 81025 URINE PREGNANCY TEST: CPT

## 2020-01-16 PROCEDURE — 81003 URINALYSIS AUTO W/O SCOPE: CPT | Performed by: EMERGENCY MEDICINE

## 2020-01-16 PROCEDURE — 84484 ASSAY OF TROPONIN QUANT: CPT | Performed by: EMERGENCY MEDICINE

## 2020-01-16 PROCEDURE — 71045 X-RAY EXAM CHEST 1 VIEW: CPT | Performed by: EMERGENCY MEDICINE

## 2020-01-16 PROCEDURE — 80053 COMPREHEN METABOLIC PANEL: CPT | Performed by: EMERGENCY MEDICINE

## 2020-01-16 PROCEDURE — 85025 COMPLETE CBC W/AUTO DIFF WBC: CPT | Performed by: EMERGENCY MEDICINE

## 2020-01-16 PROCEDURE — 85730 THROMBOPLASTIN TIME PARTIAL: CPT | Performed by: EMERGENCY MEDICINE

## 2020-01-16 PROCEDURE — 99285 EMERGENCY DEPT VISIT HI MDM: CPT

## 2020-01-16 PROCEDURE — 83880 ASSAY OF NATRIURETIC PEPTIDE: CPT | Performed by: EMERGENCY MEDICINE

## 2020-01-16 PROCEDURE — 71275 CT ANGIOGRAPHY CHEST: CPT | Performed by: EMERGENCY MEDICINE

## 2020-01-16 PROCEDURE — 96374 THER/PROPH/DIAG INJ IV PUSH: CPT

## 2020-01-16 PROCEDURE — 85610 PROTHROMBIN TIME: CPT | Performed by: EMERGENCY MEDICINE

## 2020-01-16 PROCEDURE — 93010 ELECTROCARDIOGRAM REPORT: CPT

## 2020-01-16 RX ORDER — KETOROLAC TROMETHAMINE 30 MG/ML
15 INJECTION, SOLUTION INTRAMUSCULAR; INTRAVENOUS ONCE
Status: COMPLETED | OUTPATIENT
Start: 2020-01-16 | End: 2020-01-16

## 2020-01-16 RX ORDER — ACETAMINOPHEN 500 MG
1000 TABLET ORAL ONCE
Status: COMPLETED | OUTPATIENT
Start: 2020-01-16 | End: 2020-01-16

## 2020-01-16 RX ORDER — VENLAFAXINE HYDROCHLORIDE 75 MG/1
CAPSULE, EXTENDED RELEASE ORAL
Qty: 30 CAPSULE | Refills: 0 | Status: SHIPPED | OUTPATIENT
Start: 2020-01-16 | End: 2020-01-23

## 2020-01-16 RX ORDER — ASPIRIN 81 MG/1
324 TABLET, CHEWABLE ORAL ONCE
Status: COMPLETED | OUTPATIENT
Start: 2020-01-16 | End: 2020-01-16

## 2020-01-16 NOTE — ED PROVIDER NOTES
Patient Seen in: [facility] Emergency Department      History   Patient presents with:  Chest Pain Angina    Stated Complaint: chest pain    HPI    79-year-old female presents emergency room for evaluation of chest discomfort.   Patient states sympto oropharynx is clear, uvula midline. Scalp is atraumatic. NECK: Neck is supple, there is no nuchal rigidity. No carotid bruits. No masses. Trachea midline. No cervical lymphadenopathy. HEART: Regular rate and rhythm, no murmurs.   LUNGS: Clear to auscu MDM     Patient IV established, placed on cardiac monitor and pulse ox. Patient chest x-ray performed, no acute cardiopulmonary process.   CT angiogram of the chest performed as patient did have pleuritic chest pain, history of breast cancer

## 2020-01-16 NOTE — ED INITIAL ASSESSMENT (HPI)
Pt here with chest pain that started 1 week ago mid [place] that radiates to back; pain comes and goes. Pt has no cardiac history.   She states she has had recent \"flu\" and has lingering headache and also feels SOB with exertion

## 2020-01-17 ENCOUNTER — OFFICE VISIT (OUTPATIENT)
Dept: FAMILY MEDICINE CLINIC | Facility: CLINIC | Age: 50
End: 2020-01-17
Payer: MEDICAID

## 2020-01-17 ENCOUNTER — TELEPHONE (OUTPATIENT)
Dept: FAMILY MEDICINE CLINIC | Facility: CLINIC | Age: 50
End: 2020-01-17

## 2020-01-17 VITALS
RESPIRATION RATE: 20 BRPM | WEIGHT: 115 LBS | BODY MASS INDEX: 19.63 KG/M2 | SYSTOLIC BLOOD PRESSURE: 118 MMHG | HEART RATE: 92 BPM | DIASTOLIC BLOOD PRESSURE: 86 MMHG | HEIGHT: 64 IN

## 2020-01-17 DIAGNOSIS — G44.52 NEW DAILY PERSISTENT HEADACHE: Primary | ICD-10-CM

## 2020-01-17 DIAGNOSIS — R09.81 SINUS CONGESTION: ICD-10-CM

## 2020-01-17 DIAGNOSIS — R07.81 PLEURITIC CHEST PAIN: ICD-10-CM

## 2020-01-17 DIAGNOSIS — H69.83 EUSTACHIAN TUBE DYSFUNCTION, BILATERAL: ICD-10-CM

## 2020-01-17 LAB
ATRIAL RATE: 87 BPM
P AXIS: 57 DEGREES
P-R INTERVAL: 146 MS
Q-T INTERVAL: 356 MS
QRS DURATION: 70 MS
QTC CALCULATION (BEZET): 428 MS
R AXIS: 82 DEGREES
T AXIS: 53 DEGREES
VENTRICULAR RATE: 87 BPM

## 2020-01-17 PROCEDURE — 99214 OFFICE O/P EST MOD 30 MIN: CPT | Performed by: FAMILY MEDICINE

## 2020-01-17 RX ORDER — CEFDINIR 300 MG/1
300 CAPSULE ORAL 2 TIMES DAILY
Qty: 20 CAPSULE | Refills: 0 | Status: SHIPPED | OUTPATIENT
Start: 2020-01-17 | End: 2020-03-05

## 2020-01-17 RX ORDER — ATENOLOL 25 MG/1
25 TABLET ORAL DAILY
Qty: 30 TABLET | Refills: 0 | Status: SHIPPED | OUTPATIENT
Start: 2020-01-17 | End: 2020-03-05

## 2020-01-17 RX ORDER — METHYLPREDNISOLONE 4 MG/1
TABLET ORAL
Qty: 1 KIT | Refills: 0 | Status: SHIPPED | OUTPATIENT
Start: 2020-01-17 | End: 2020-01-23 | Stop reason: ALTCHOICE

## 2020-01-17 NOTE — TELEPHONE ENCOUNTER
[address] [facility] [place] [address] Caitlin Sánchez requests call to pt-sts pt was seen in ER yesterday for chest pain-had complete work up including EKG, chest CTA, extensive labs-all wnl. Alva to possibly be viral syndrome.   Get condition update from pt-if stable or improved do not fee

## 2020-01-17 NOTE — PROGRESS NOTES
Jana Spann is a 54year old female. cc headache, congestion, chest pain   HPI:   Patient is coming to the office for concern of headache. Started to have the symptoms week ago.   She was coming back from [place] on the plane after this started to have c Propionate 50 MCG/ACT Nasal Suspension Use 2 sprays each nostril once daily after shower. Stop if you develop nose bleeds. 1 Inhaler 0   • ALPRAZolam 0.5 MG Oral Tab TAKE 1 TABLET BY MOUTH DAILY AS NEEDED. Please schedule medication check.  30 tablet 0   • nourished,in no apparent distress, tired congested  SKIN: no rashes,no suspicious lesions  HEENT: atraumatic, normocephalic,ears -tympanic membranes bilaterally irritation of the posterior throat and irritation of the nasal mucosa   NECK: supple,no adenopa NATRIURETIC PEPTIDE   Result Value Ref Range    Pro-Beta Natriuretic Peptide 43 <125 pg/mL   PTT, ACTIVATED   Result Value Ref Range    PTT 29.3 25.4 - 36.1 seconds   PROTHROMBIN TIME (PT)   Result Value Ref Range    PT 13.2 12.5 - 14.7 seconds    INR 0.96 rhythm  Normal ECG  When compared with ECG of 02-SEP-2018 13:30,  No significant change was found  Confirmed by Magalie Alvarez M.D., GERTRUDIS ([id number]) on 1/17/2020 8:44:51 AM    PROCEDURE:  CT ANGIOGRAPHY, CHEST (CPT=71275)     COMPARISON:  [facility] , CT, CT ANGIOGRAPHY, LADY Prescriptions     Signed Prescriptions Disp Refills   • cefdinir 300 MG Oral Cap 20 capsule 0     Sig: Take 1 capsule (300 mg total) by mouth 2 (two) times daily. • methylPREDNISolone (MEDROL) 4 MG Oral Tablet Therapy Pack 1 kit 0     Sig: As directed.

## 2020-01-23 ENCOUNTER — OFFICE VISIT (OUTPATIENT)
Dept: FAMILY MEDICINE CLINIC | Facility: CLINIC | Age: 50
End: 2020-01-23
Payer: MEDICAID

## 2020-01-23 VITALS
SYSTOLIC BLOOD PRESSURE: 104 MMHG | BODY MASS INDEX: 19.63 KG/M2 | HEART RATE: 96 BPM | WEIGHT: 115 LBS | OXYGEN SATURATION: 96 % | TEMPERATURE: 98 F | RESPIRATION RATE: 16 BRPM | HEIGHT: 64 IN | DIASTOLIC BLOOD PRESSURE: 74 MMHG

## 2020-01-23 DIAGNOSIS — B37.3 YEAST VAGINITIS: ICD-10-CM

## 2020-01-23 DIAGNOSIS — F41.9 ANXIETY: ICD-10-CM

## 2020-01-23 DIAGNOSIS — R07.81 PLEURITIC CHEST PAIN: Primary | ICD-10-CM

## 2020-01-23 DIAGNOSIS — R09.81 SINUS CONGESTION: ICD-10-CM

## 2020-01-23 DIAGNOSIS — R03.0 ELEVATED BLOOD PRESSURE READING IN OFFICE WITHOUT DIAGNOSIS OF HYPERTENSION: ICD-10-CM

## 2020-01-23 PROBLEM — B37.31 YEAST VAGINITIS: Status: ACTIVE | Noted: 2020-01-23

## 2020-01-23 PROCEDURE — 99214 OFFICE O/P EST MOD 30 MIN: CPT | Performed by: FAMILY MEDICINE

## 2020-01-23 RX ORDER — FLUCONAZOLE 150 MG/1
150 TABLET ORAL ONCE
Qty: 1 TABLET | Refills: 0 | Status: SHIPPED | OUTPATIENT
Start: 2020-01-23 | End: 2020-01-23

## 2020-01-23 RX ORDER — VENLAFAXINE HYDROCHLORIDE 75 MG/1
75 CAPSULE, EXTENDED RELEASE ORAL DAILY
Qty: 30 CAPSULE | Refills: 3 | Status: SHIPPED | OUTPATIENT
Start: 2020-01-23 | End: 2020-02-20

## 2020-01-23 NOTE — PATIENT INSTRUCTIONS
Finish course of antibiotic. Diflucan 150 mg 1 tablet once orally. Probiotic daily. Good hydration. Monitor blood pressure at home periodically. Continue venlafaxine at the current dose. Healthy diet.

## 2020-01-23 NOTE — PROGRESS NOTES
Quin Oliva is a 54year old female. cc sinus congestion pleuritic chest pain elevated blood pressure anxiety  HPI:   Patient is coming for follow-up of sinus congestion she is on Ceftin ear has few more days to finish antibiotic doing well.   She thinks needed for Sleep. 30 tablet 0   • Multiple Vitamins-Minerals (MULTIVITAMIN ADULT) Oral Tab Take by mouth.         Past Medical History:   Diagnosis Date   • Anxiety state, unspecified    • Breast CA ([facility] [address]    • Depression    • DJD (degenerative joint disease vaginitis    No orders of the defined types were placed in this encounter.       Meds & Refills for this Visit:  Requested Prescriptions     Signed Prescriptions Disp Refills   • Venlafaxine HCl ER 75 MG Oral Capsule SR 24 Hr 30 capsule 3     Sig: Take 1 ca

## 2020-02-17 ENCOUNTER — TELEPHONE (OUTPATIENT)
Dept: FAMILY MEDICINE CLINIC | Facility: CLINIC | Age: 50
End: 2020-02-17

## 2020-02-17 DIAGNOSIS — Z01.818 PRE-OP TESTING: ICD-10-CM

## 2020-02-17 DIAGNOSIS — D50.0 IRON DEFICIENCY ANEMIA DUE TO CHRONIC BLOOD LOSS: Primary | ICD-10-CM

## 2020-02-17 NOTE — TELEPHONE ENCOUNTER
Received request for medical clearance H&P, EKG (within 6 months of surgery), CBC and BMP to be completed for pt's Breast Augmentation on 3/17/2020 with Dr Shannan Littlejohn at the [facility]. Pt has EKG from 1/16/2020. Orders entered.   Called to pt to s

## 2020-02-20 RX ORDER — VENLAFAXINE HYDROCHLORIDE 75 MG/1
CAPSULE, EXTENDED RELEASE ORAL
Qty: 30 CAPSULE | Refills: 3 | Status: SHIPPED | OUTPATIENT
Start: 2020-02-20 | End: 2020-07-27

## 2020-02-20 NOTE — TELEPHONE ENCOUNTER
VENLAFAXINE ER 75MG CAPSULES    Non protocol medication. Please see pended medications. Please sign if appropriate.       Thank you    Last OV: 01/23/2020    Last refill: 01/23/2020

## 2020-03-02 ENCOUNTER — NURSE ONLY (OUTPATIENT)
Dept: HEMATOLOGY/ONCOLOGY | Facility: HOSPITAL | Age: 50
End: 2020-03-02
Attending: INTERNAL MEDICINE
Payer: MEDICAID

## 2020-03-02 DIAGNOSIS — D50.0 IRON DEFICIENCY ANEMIA DUE TO CHRONIC BLOOD LOSS: ICD-10-CM

## 2020-03-02 DIAGNOSIS — D05.11 DUCTAL CARCINOMA IN SITU (DCIS) OF RIGHT BREAST: ICD-10-CM

## 2020-03-02 LAB
BASOPHILS # BLD AUTO: 0.04 X10(3) UL (ref 0–0.2)
BASOPHILS NFR BLD AUTO: 1.1 %
DEPRECATED HBV CORE AB SER IA-ACNC: 21.5 NG/ML (ref 12–240)
DEPRECATED RDW RBC AUTO: 41 FL (ref 35.1–46.3)
EOSINOPHIL # BLD AUTO: 0.1 X10(3) UL (ref 0–0.7)
EOSINOPHIL NFR BLD AUTO: 2.8 %
ERYTHROCYTE [DISTWIDTH] IN BLOOD BY AUTOMATED COUNT: 12.4 % (ref 11–15)
HCT VFR BLD AUTO: 43.2 % (ref 35–48)
HGB BLD-MCNC: 14.3 G/DL (ref 12–16)
IMM GRANULOCYTES # BLD AUTO: 0.01 X10(3) UL (ref 0–1)
IMM GRANULOCYTES NFR BLD: 0.3 %
LYMPHOCYTES # BLD AUTO: 1.52 X10(3) UL (ref 1–4)
LYMPHOCYTES NFR BLD AUTO: 42.3 %
MCH RBC QN AUTO: 29.6 PG (ref 26–34)
MCHC RBC AUTO-ENTMCNC: 33.1 G/DL (ref 31–37)
MCV RBC AUTO: 89.4 FL (ref 80–100)
MONOCYTES # BLD AUTO: 0.4 X10(3) UL (ref 0.1–1)
MONOCYTES NFR BLD AUTO: 11.1 %
NEUTROPHILS # BLD AUTO: 1.52 X10 (3) UL (ref 1.5–7.7)
NEUTROPHILS # BLD AUTO: 1.52 X10(3) UL (ref 1.5–7.7)
NEUTROPHILS NFR BLD AUTO: 42.4 %
PLATELET # BLD AUTO: 306 10(3)UL (ref 150–450)
RBC # BLD AUTO: 4.83 X10(6)UL (ref 3.8–5.3)
WBC # BLD AUTO: 3.6 X10(3) UL (ref 4–11)

## 2020-03-02 PROCEDURE — 36415 COLL VENOUS BLD VENIPUNCTURE: CPT

## 2020-03-02 PROCEDURE — 85025 COMPLETE CBC W/AUTO DIFF WBC: CPT

## 2020-03-02 PROCEDURE — 82728 ASSAY OF FERRITIN: CPT

## 2020-03-05 ENCOUNTER — OFFICE VISIT (OUTPATIENT)
Dept: FAMILY MEDICINE CLINIC | Facility: CLINIC | Age: 50
End: 2020-03-05
Payer: MEDICAID

## 2020-03-05 VITALS
TEMPERATURE: 99 F | BODY MASS INDEX: 19.63 KG/M2 | WEIGHT: 115 LBS | DIASTOLIC BLOOD PRESSURE: 52 MMHG | HEIGHT: 64 IN | OXYGEN SATURATION: 99 % | SYSTOLIC BLOOD PRESSURE: 98 MMHG | HEART RATE: 78 BPM | RESPIRATION RATE: 16 BRPM

## 2020-03-05 DIAGNOSIS — Z86.000 HISTORY OF DUCTAL CARCINOMA IN SITU (DCIS) OF BREAST: ICD-10-CM

## 2020-03-05 DIAGNOSIS — K44.9 HIATAL HERNIA: ICD-10-CM

## 2020-03-05 DIAGNOSIS — Z98.82 HISTORY OF BREAST AUGMENTATION: ICD-10-CM

## 2020-03-05 DIAGNOSIS — D72.819 LEUKOPENIA, UNSPECIFIED TYPE: ICD-10-CM

## 2020-03-05 DIAGNOSIS — F41.9 ANXIETY: ICD-10-CM

## 2020-03-05 DIAGNOSIS — Z01.818 PRE-OP EXAMINATION: Primary | ICD-10-CM

## 2020-03-05 DIAGNOSIS — F51.01 PRIMARY INSOMNIA: ICD-10-CM

## 2020-03-05 PROCEDURE — 99243 OFF/OP CNSLTJ NEW/EST LOW 30: CPT | Performed by: FAMILY MEDICINE

## 2020-03-05 NOTE — PROGRESS NOTES
Ny Ferguson is a 54year old female who presents for a pre-operative physical exam. Patient is to have breast augmentation at [facility] on March 17, 2020 by Dr. Rayna Duarte, to be done by Katja Guerrero.       HPI:   Pt complains of having irregula Allergies: No Known Allergies   Past Medical History:   Diagnosis Date   • Anxiety state, unspecified    • Breast CA ([facility] [address]    • Depression    • DJD (degenerative joint disease), lumbar    • Ductal carcinoma in situ of breast 2015   • Headache(784.0)    • asthma    EXAM:   BP 98/52 (BP Location: Left arm, Patient Position: Sitting, Cuff Size: adult)   Pulse 78   Temp 98.7 °F (37.1 °C) (Oral)   Resp 16   Ht 64\"   Wt 115 lb (52.2 kg)   SpO2 99%   Breastfeeding No   BMI 19.74 kg/m²   GENERAL: well developed, normal. Copy of the results attached.     ASSESSMENT AND PLAN:   Rosita iSlva is a 54year old female who presents for a pre-operative physical exam.  Pre-op examination  (primary encounter diagnosis)  History of breast augmentation  History of ductal car

## 2020-03-07 ENCOUNTER — LAB ENCOUNTER (OUTPATIENT)
Dept: LAB | Facility: HOSPITAL | Age: 50
End: 2020-03-07
Attending: FAMILY MEDICINE
Payer: MEDICAID

## 2020-03-07 DIAGNOSIS — Z01.818 PRE-OP TESTING: ICD-10-CM

## 2020-03-07 DIAGNOSIS — Z01.818 PRE-OP EXAMINATION: ICD-10-CM

## 2020-03-07 LAB
ANION GAP SERPL CALC-SCNC: 2 MMOL/L (ref 0–18)
BASOPHILS # BLD AUTO: 0.04 X10(3) UL (ref 0–0.2)
BASOPHILS NFR BLD AUTO: 1.1 %
BUN BLD-MCNC: 13 MG/DL (ref 7–18)
BUN/CREAT SERPL: 22.4 (ref 10–20)
CALCIUM BLD-MCNC: 8.9 MG/DL (ref 8.5–10.1)
CHLORIDE SERPL-SCNC: 106 MMOL/L (ref 98–112)
CO2 SERPL-SCNC: 30 MMOL/L (ref 21–32)
CREAT BLD-MCNC: 0.58 MG/DL (ref 0.55–1.02)
DEPRECATED RDW RBC AUTO: 40.9 FL (ref 35.1–46.3)
EOSINOPHIL # BLD AUTO: 0.07 X10(3) UL (ref 0–0.7)
EOSINOPHIL NFR BLD AUTO: 2 %
ERYTHROCYTE [DISTWIDTH] IN BLOOD BY AUTOMATED COUNT: 12.4 % (ref 11–15)
GLUCOSE BLD-MCNC: 86 MG/DL (ref 70–99)
HCT VFR BLD AUTO: 42.8 % (ref 35–48)
HGB BLD-MCNC: 13.8 G/DL (ref 12–16)
IMM GRANULOCYTES # BLD AUTO: 0 X10(3) UL (ref 0–1)
IMM GRANULOCYTES NFR BLD: 0 %
LYMPHOCYTES # BLD AUTO: 1.51 X10(3) UL (ref 1–4)
LYMPHOCYTES NFR BLD AUTO: 42.8 %
MCH RBC QN AUTO: 29 PG (ref 26–34)
MCHC RBC AUTO-ENTMCNC: 32.2 G/DL (ref 31–37)
MCV RBC AUTO: 89.9 FL (ref 80–100)
MONOCYTES # BLD AUTO: 0.45 X10(3) UL (ref 0.1–1)
MONOCYTES NFR BLD AUTO: 12.7 %
NEUTROPHILS # BLD AUTO: 1.46 X10 (3) UL (ref 1.5–7.7)
NEUTROPHILS # BLD AUTO: 1.46 X10(3) UL (ref 1.5–7.7)
NEUTROPHILS NFR BLD AUTO: 41.4 %
OSMOLALITY SERPL CALC.SUM OF ELEC: 285 MOSM/KG (ref 275–295)
PATIENT FASTING Y/N/NP: NO
PLATELET # BLD AUTO: 301 10(3)UL (ref 150–450)
POTASSIUM SERPL-SCNC: 4 MMOL/L (ref 3.5–5.1)
RBC # BLD AUTO: 4.76 X10(6)UL (ref 3.8–5.3)
SODIUM SERPL-SCNC: 138 MMOL/L (ref 136–145)
WBC # BLD AUTO: 3.5 X10(3) UL (ref 4–11)

## 2020-03-07 PROCEDURE — 80048 BASIC METABOLIC PNL TOTAL CA: CPT

## 2020-03-07 PROCEDURE — 36415 COLL VENOUS BLD VENIPUNCTURE: CPT

## 2020-03-07 PROCEDURE — 85025 COMPLETE CBC W/AUTO DIFF WBC: CPT

## 2020-03-09 ENCOUNTER — OFFICE VISIT (OUTPATIENT)
Dept: HEMATOLOGY/ONCOLOGY | Facility: HOSPITAL | Age: 50
End: 2020-03-09
Attending: INTERNAL MEDICINE
Payer: MEDICAID

## 2020-03-09 ENCOUNTER — HOSPITAL ENCOUNTER (OUTPATIENT)
Dept: MAMMOGRAPHY | Facility: HOSPITAL | Age: 50
Discharge: HOME | End: 2020-03-09
Attending: SURGERY
Payer: MEDICAID

## 2020-03-09 VITALS
SYSTOLIC BLOOD PRESSURE: 108 MMHG | WEIGHT: 123 LBS | BODY MASS INDEX: 21 KG/M2 | OXYGEN SATURATION: 95 % | TEMPERATURE: 99 F | RESPIRATION RATE: 16 BRPM | HEIGHT: 64.02 IN | HEART RATE: 91 BPM | DIASTOLIC BLOOD PRESSURE: 71 MMHG

## 2020-03-09 DIAGNOSIS — D05.11 DUCTAL CARCINOMA IN SITU (DCIS) OF RIGHT BREAST: ICD-10-CM

## 2020-03-09 DIAGNOSIS — D05.11 DUCTAL CARCINOMA IN SITU (DCIS) OF RIGHT BREAST: Primary | ICD-10-CM

## 2020-03-09 DIAGNOSIS — D50.0 IRON DEFICIENCY ANEMIA DUE TO CHRONIC BLOOD LOSS: ICD-10-CM

## 2020-03-09 PROCEDURE — 77066 DX MAMMO INCL CAD BI: CPT | Performed by: SURGERY

## 2020-03-09 PROCEDURE — 77062 BREAST TOMOSYNTHESIS BI: CPT | Performed by: SURGERY

## 2020-03-09 PROCEDURE — 99213 OFFICE O/P EST LOW 20 MIN: CPT | Performed by: INTERNAL MEDICINE

## 2020-03-09 NOTE — PROGRESS NOTES
Cancer Center Progress Note    Problem List:      Patient Active Problem List:     Dysmenorrhea     Chest pain, unspecified     Insomnia, unspecified     Acute pharyngitis     Lumbago     Headache(784.0)     Leukocytopenia     Umbilical hernia without ment dyspnea. Cardiovascular: Negative for angina, orthopnea or palpitations. Gastrointestinal: Negative for nausea, vomiting, change in bowel habits, diarrhea, constipation and abdominal pain.   Integument/breast: Negative for rash, skin lesions, and pruritus Capsule SR 24 Hr, TAKE 1 CAPSULE BY MOUTH DAILY. , Disp: 30 capsule, Rfl: 3  Fluticasone Propionate 50 MCG/ACT Nasal Suspension, Use 2 sprays each nostril once daily after shower. Stop if you develop nose bleeds. , Disp: 1 Inhaler, Rfl: 0  ALPRAZolam 0.5 MG 03/07/2020    HGB 13.8 03/07/2020    HCT 42.8 03/07/2020    MCV 89.9 03/07/2020    MCH 29.0 03/07/2020    MCHC 32.2 03/07/2020    RDW 12.4 03/07/2020    .0 03/07/2020    MPV 10.1 (H) 08/19/2011     Lab Results   Component Value Date     03/07/ counts are adequate. I would recommend repeat labs in six months. I will see her in six months with labs.     Transient neutropenia with folate deficiency:     Her repeat WBC is normal. Her folate level was low.  The B12 is normal. Continue folate 1 mg claudia

## 2020-05-04 ENCOUNTER — HOSPITAL ENCOUNTER (OUTPATIENT)
Age: 50
Discharge: HOME | End: 2020-05-04
Attending: FAMILY MEDICINE
Payer: MEDICAID

## 2020-05-04 VITALS
HEART RATE: 86 BPM | OXYGEN SATURATION: 100 % | WEIGHT: 120 LBS | HEIGHT: 64 IN | SYSTOLIC BLOOD PRESSURE: 104 MMHG | TEMPERATURE: 99 F | DIASTOLIC BLOOD PRESSURE: 75 MMHG | BODY MASS INDEX: 20.49 KG/M2 | RESPIRATION RATE: 16 BRPM

## 2020-05-04 DIAGNOSIS — R30.0 DYSURIA: Primary | ICD-10-CM

## 2020-05-04 PROCEDURE — 99214 OFFICE O/P EST MOD 30 MIN: CPT

## 2020-05-04 PROCEDURE — 81025 URINE PREGNANCY TEST: CPT | Performed by: FAMILY MEDICINE

## 2020-05-04 PROCEDURE — 87086 URINE CULTURE/COLONY COUNT: CPT | Performed by: FAMILY MEDICINE

## 2020-05-04 PROCEDURE — 81002 URINALYSIS NONAUTO W/O SCOPE: CPT | Performed by: FAMILY MEDICINE

## 2020-05-04 RX ORDER — PHENAZOPYRIDINE HYDROCHLORIDE 200 MG/1
200 TABLET, FILM COATED ORAL 3 TIMES DAILY PRN
Qty: 6 TABLET | Refills: 0 | Status: SHIPPED | OUTPATIENT
Start: 2020-05-04 | End: 2020-05-07

## 2020-05-04 RX ORDER — PHENAZOPYRIDINE HYDROCHLORIDE 200 MG/1
200 TABLET, FILM COATED ORAL 3 TIMES DAILY PRN
Qty: 6 TABLET | Refills: 0 | Status: SHIPPED | OUTPATIENT
Start: 2020-05-04 | End: 2020-05-04

## 2020-05-05 NOTE — ED PROVIDER NOTES
Patient Seen in: [facility]      History   Patient presents with:  Urinary Symptoms  Back Pain    Stated Complaint: Painful urination, back pain    HPI    16-year-old female with a history of DCIS, anxiety, and depression ton 16   Temp 99 °F (37.2 °C)   Temp src Oral   SpO2 100 %   O2 Device None (Room air)       Current:/75   Pulse 86   Temp 99 °F (37.2 °C) (Oral)   Resp 16   Ht 162.6 cm (5' 4\")   Wt 54.4 kg   LMP 04/24/2020 (Approximate)   SpO2 100%   Breastfeeding No medications    Phenazopyridine HCl (PYRIDIUM) 200 MG Oral Tab  Take 1 tablet (200 mg total) by mouth 3 (three) times daily as needed for Pain.   Qty: 6 tablet Refills: 0

## 2020-07-02 ENCOUNTER — NURSE ONLY (OUTPATIENT)
Dept: HEMATOLOGY/ONCOLOGY | Facility: HOSPITAL | Age: 50
End: 2020-07-02
Attending: INTERNAL MEDICINE
Payer: MEDICAID

## 2020-07-02 DIAGNOSIS — D50.0 IRON DEFICIENCY ANEMIA DUE TO CHRONIC BLOOD LOSS: ICD-10-CM

## 2020-07-02 LAB
BASOPHILS # BLD AUTO: 0.04 X10(3) UL (ref 0–0.2)
BASOPHILS NFR BLD AUTO: 1.1 %
DEPRECATED HBV CORE AB SER IA-ACNC: 15.4 NG/ML (ref 12–240)
DEPRECATED RDW RBC AUTO: 40.7 FL (ref 35.1–46.3)
EOSINOPHIL # BLD AUTO: 0.09 X10(3) UL (ref 0–0.7)
EOSINOPHIL NFR BLD AUTO: 2.6 %
ERYTHROCYTE [DISTWIDTH] IN BLOOD BY AUTOMATED COUNT: 12.6 % (ref 11–15)
HCT VFR BLD AUTO: 39.8 % (ref 35–48)
HGB BLD-MCNC: 13.2 G/DL (ref 12–16)
IMM GRANULOCYTES # BLD AUTO: 0 X10(3) UL (ref 0–1)
IMM GRANULOCYTES NFR BLD: 0 %
LYMPHOCYTES # BLD AUTO: 1.63 X10(3) UL (ref 1–4)
LYMPHOCYTES NFR BLD AUTO: 46.3 %
MCH RBC QN AUTO: 29.1 PG (ref 26–34)
MCHC RBC AUTO-ENTMCNC: 33.2 G/DL (ref 31–37)
MCV RBC AUTO: 87.7 FL (ref 80–100)
MONOCYTES # BLD AUTO: 0.44 X10(3) UL (ref 0.1–1)
MONOCYTES NFR BLD AUTO: 12.5 %
NEUTROPHILS # BLD AUTO: 1.32 X10 (3) UL (ref 1.5–7.7)
NEUTROPHILS # BLD AUTO: 1.32 X10(3) UL (ref 1.5–7.7)
NEUTROPHILS NFR BLD AUTO: 37.5 %
PLATELET # BLD AUTO: 272 10(3)UL (ref 150–450)
RBC # BLD AUTO: 4.54 X10(6)UL (ref 3.8–5.3)
WBC # BLD AUTO: 3.5 X10(3) UL (ref 4–11)

## 2020-07-02 PROCEDURE — 36415 COLL VENOUS BLD VENIPUNCTURE: CPT

## 2020-07-02 PROCEDURE — 82728 ASSAY OF FERRITIN: CPT

## 2020-07-02 PROCEDURE — 85025 COMPLETE CBC W/AUTO DIFF WBC: CPT

## 2020-07-27 NOTE — TELEPHONE ENCOUNTER
VENLAFAXINE ER 75MG CAPSULES    None protocol medication. Please see pended medications. Please sign if appropriate.       Thank you      Last OV: 01/23/2020    Last Refill:  02/20/2020      A Expan message was sent to the pt to call and schedule a

## 2020-07-28 RX ORDER — VENLAFAXINE HYDROCHLORIDE 75 MG/1
CAPSULE, EXTENDED RELEASE ORAL
Qty: 30 CAPSULE | Refills: 0 | Status: SHIPPED | OUTPATIENT
Start: 2020-07-28 | End: 2021-03-02

## 2020-07-28 NOTE — TELEPHONE ENCOUNTER
Pt is due for medication visit so we can continue refills.   Please set up an appointment for her in August.  I have called in 1 month supply for her venlafaxine   thank you

## 2020-08-19 ENCOUNTER — MED REC SCAN ONLY (OUTPATIENT)
Dept: FAMILY MEDICINE CLINIC | Facility: CLINIC | Age: 50
End: 2020-08-19

## 2020-10-27 ENCOUNTER — TELEPHONE (OUTPATIENT)
Dept: FAMILY MEDICINE CLINIC | Facility: CLINIC | Age: 50
End: 2020-10-27

## 2020-10-27 NOTE — TELEPHONE ENCOUNTER
1. What are your symptoms? Itchy, small red bumps all over body      2. How long have you been having these symptoms? About three weeks      3. Have you done anything already to treat your symptoms?        Zyrtec - does not help      ADDITIONAL INFO:

## 2020-10-27 NOTE — TELEPHONE ENCOUNTER
S/w pt. Reports itchy bumps x 3 weeks. Feels it started in one area and then spread. No sob/no throat swelling. Used topical with no relief. Asking to be seen today. I offered urgent care for evaluation since Dr Mason Cabral is out of office.  She voiced [place]

## 2020-11-03 ENCOUNTER — HOSPITAL ENCOUNTER (OUTPATIENT)
Age: 50
Discharge: HOME | End: 2020-11-03
Payer: MEDICAID

## 2020-11-03 VITALS
RESPIRATION RATE: 16 BRPM | HEART RATE: 82 BPM | OXYGEN SATURATION: 100 % | TEMPERATURE: 99 F | DIASTOLIC BLOOD PRESSURE: 84 MMHG | SYSTOLIC BLOOD PRESSURE: 125 MMHG

## 2020-11-03 DIAGNOSIS — R21 RASH: ICD-10-CM

## 2020-11-03 DIAGNOSIS — L73.9 FOLLICULITIS: Primary | ICD-10-CM

## 2020-11-03 PROCEDURE — 99213 OFFICE O/P EST LOW 20 MIN: CPT | Performed by: EMERGENCY MEDICINE

## 2020-11-03 PROCEDURE — 36415 COLL VENOUS BLD VENIPUNCTURE: CPT | Performed by: EMERGENCY MEDICINE

## 2020-11-03 PROCEDURE — 85025 COMPLETE CBC W/AUTO DIFF WBC: CPT | Performed by: EMERGENCY MEDICINE

## 2020-11-03 RX ORDER — HYDROXYZINE HYDROCHLORIDE 25 MG/1
25 TABLET, FILM COATED ORAL NIGHTLY
Qty: 20 TABLET | Refills: 0 | Status: SHIPPED | OUTPATIENT
Start: 2020-11-03 | End: 2021-03-02 | Stop reason: ALTCHOICE

## 2020-11-03 RX ORDER — CEPHALEXIN 500 MG/1
500 CAPSULE ORAL 2 TIMES DAILY
Qty: 14 CAPSULE | Refills: 0 | Status: SHIPPED | OUTPATIENT
Start: 2020-11-03 | End: 2020-11-10

## 2020-11-03 NOTE — ED INITIAL ASSESSMENT (HPI)
Patient states having red bumps all over her body x 4 weeks, states she noticed left arm swelling and redness since this morning. Patient denies any changes in environmental factors. Patient denies fever.    Patient states she has been taking Benadryl and

## 2020-11-03 NOTE — ED PROVIDER NOTES
Patient Seen in: [facility] [place]      History   Patient presents with:  Rash Skin Problem    Stated Complaint: swollen left hand, rash    Branden Maloney is a 52year old female present with rash that started 4 weeks ago on the left side of her ch Tobacco Use      Smoking status: Never Smoker      Smokeless tobacco: Never Used    Alcohol use: No      Alcohol/week: 0.0 standard drinks      Frequency: Never    Drug use:  No             Review of Systems    Positive for stated complaint: swollen left ha Abnormal; Notable for the following components:       Result Value    WBC IC 3.9 (*)     All other components within normal limits                    MDM          Presentation most consistent with folliculitis type rash vs eczema vs nonspecific rash.      Jono Garg

## 2020-11-17 ENCOUNTER — NURSE ONLY (OUTPATIENT)
Dept: HEMATOLOGY/ONCOLOGY | Facility: HOSPITAL | Age: 50
End: 2020-11-17
Attending: INTERNAL MEDICINE
Payer: MEDICAID

## 2020-11-17 DIAGNOSIS — D50.0 IRON DEFICIENCY ANEMIA DUE TO CHRONIC BLOOD LOSS: ICD-10-CM

## 2020-11-17 DIAGNOSIS — D05.11 DUCTAL CARCINOMA IN SITU (DCIS) OF RIGHT BREAST: ICD-10-CM

## 2020-11-17 PROCEDURE — 36415 COLL VENOUS BLD VENIPUNCTURE: CPT

## 2020-11-17 PROCEDURE — 85025 COMPLETE CBC W/AUTO DIFF WBC: CPT

## 2020-11-17 PROCEDURE — 82728 ASSAY OF FERRITIN: CPT

## 2020-11-18 ENCOUNTER — OFFICE VISIT (OUTPATIENT)
Dept: FAMILY MEDICINE CLINIC | Facility: CLINIC | Age: 50
End: 2020-11-18
Payer: MEDICAID

## 2020-11-18 ENCOUNTER — LAB ENCOUNTER (OUTPATIENT)
Dept: LAB | Age: 50
End: 2020-11-18
Attending: FAMILY MEDICINE
Payer: MEDICAID

## 2020-11-18 VITALS
HEART RATE: 84 BPM | HEIGHT: 64 IN | TEMPERATURE: 98 F | SYSTOLIC BLOOD PRESSURE: 100 MMHG | WEIGHT: 123 LBS | BODY MASS INDEX: 21 KG/M2 | DIASTOLIC BLOOD PRESSURE: 70 MMHG | RESPIRATION RATE: 14 BRPM

## 2020-11-18 DIAGNOSIS — R21 RASH AND NONSPECIFIC SKIN ERUPTION: ICD-10-CM

## 2020-11-18 DIAGNOSIS — T14.8XXA BRUISING: Primary | ICD-10-CM

## 2020-11-18 DIAGNOSIS — Z01.818 PRE-OP TESTING: ICD-10-CM

## 2020-11-18 DIAGNOSIS — D50.0 IRON DEFICIENCY ANEMIA DUE TO CHRONIC BLOOD LOSS: ICD-10-CM

## 2020-11-18 DIAGNOSIS — T14.8XXA BRUISING: ICD-10-CM

## 2020-11-18 PROCEDURE — 3008F BODY MASS INDEX DOCD: CPT | Performed by: FAMILY MEDICINE

## 2020-11-18 PROCEDURE — 99214 OFFICE O/P EST MOD 30 MIN: CPT | Performed by: FAMILY MEDICINE

## 2020-11-18 PROCEDURE — 86140 C-REACTIVE PROTEIN: CPT

## 2020-11-18 PROCEDURE — 36415 COLL VENOUS BLD VENIPUNCTURE: CPT

## 2020-11-18 PROCEDURE — 80053 COMPREHEN METABOLIC PANEL: CPT

## 2020-11-18 PROCEDURE — 85610 PROTHROMBIN TIME: CPT

## 2020-11-18 PROCEDURE — 83540 ASSAY OF IRON: CPT

## 2020-11-18 PROCEDURE — 85730 THROMBOPLASTIN TIME PARTIAL: CPT

## 2020-11-18 PROCEDURE — 3074F SYST BP LT 130 MM HG: CPT | Performed by: FAMILY MEDICINE

## 2020-11-18 PROCEDURE — 83550 IRON BINDING TEST: CPT

## 2020-11-18 PROCEDURE — 3078F DIAST BP <80 MM HG: CPT | Performed by: FAMILY MEDICINE

## 2020-11-18 RX ORDER — PREDNISONE 10 MG/1
TABLET ORAL
Qty: 33 TABLET | Refills: 0 | Status: SHIPPED | OUTPATIENT
Start: 2020-11-18 | End: 2021-03-02 | Stop reason: ALTCHOICE

## 2020-11-18 RX ORDER — FAMOTIDINE 20 MG/1
20 TABLET ORAL 2 TIMES DAILY
Qty: 60 TABLET | Refills: 0 | Status: SHIPPED | OUTPATIENT
Start: 2020-11-18 | End: 2021-03-02

## 2020-11-18 RX ORDER — DOXYCYCLINE HYCLATE 100 MG/1
100 CAPSULE ORAL 2 TIMES DAILY
COMMUNITY
Start: 2020-11-10 | End: 2021-03-02 | Stop reason: ALTCHOICE

## 2020-11-18 NOTE — PROGRESS NOTES
Patty Keating is a 52year old female. cc rash, bruising  HPI:   Patient is coming to the office complaining of rash which she noticed 2 months ago. She had maculopapular lesions all over her body. It was very itchy.   She went to [facility] urgent care off schedule medication check. 30 tablet 0   • folic acid 1 MG Oral Tab Take 1 tablet (1 mg total) by mouth daily. 90 tablet 3   • Zolpidem Tartrate 5 MG Oral Tab Take 1 tablet (5 mg total) by mouth nightly as needed for Sleep.  30 tablet 0   • Multiple Vitamin oriented x3, normal mood     Results for orders placed or performed in visit on 11/17/20   FERRITIN   Result Value Ref Range    Ferritin 6.4 (L) 12.0 - 240.0 ng/mL   CBC W/ DIFFERENTIAL   Result Value Ref Range    WBC 3.7 (L) 4.0 - 11.0 x10(3) uL    RBC 4. blood test results make further recommendation. Forward dermatology reports to our office. Imaging & Consults:  None    The patient indicates understanding of these issues and agrees to the plan. The patient is asked to return in TBD after tests.

## 2020-11-18 NOTE — PATIENT INSTRUCTIONS
Zyrtec 10 mg 1 tablet at night. Use Benadryl as needed. Try calamine lotion as needed for itching  You can try Sarna lotion over-the-counter as needed. Start famotidine 40 mg 1 tablet twice a day.   We will call you blood test results make further enrike

## 2020-11-25 ENCOUNTER — OFFICE VISIT (OUTPATIENT)
Dept: HEMATOLOGY/ONCOLOGY | Facility: HOSPITAL | Age: 50
End: 2020-11-25
Attending: INTERNAL MEDICINE
Payer: MEDICAID

## 2020-11-25 VITALS
OXYGEN SATURATION: 100 % | TEMPERATURE: 98 F | WEIGHT: 125 LBS | BODY MASS INDEX: 21 KG/M2 | RESPIRATION RATE: 18 BRPM | SYSTOLIC BLOOD PRESSURE: 123 MMHG | DIASTOLIC BLOOD PRESSURE: 79 MMHG | HEART RATE: 86 BPM

## 2020-11-25 DIAGNOSIS — N92.1 MENORRHAGIA WITH IRREGULAR CYCLE: ICD-10-CM

## 2020-11-25 DIAGNOSIS — D70.8 OTHER NEUTROPENIA (HCC): ICD-10-CM

## 2020-11-25 DIAGNOSIS — D50.0 IRON DEFICIENCY ANEMIA DUE TO CHRONIC BLOOD LOSS: Primary | ICD-10-CM

## 2020-11-25 DIAGNOSIS — D05.11 DUCTAL CARCINOMA IN SITU (DCIS) OF RIGHT BREAST: ICD-10-CM

## 2020-11-25 DIAGNOSIS — E53.8 FOLIC ACID DEFICIENCY: ICD-10-CM

## 2020-11-25 PROCEDURE — 99213 OFFICE O/P EST LOW 20 MIN: CPT | Performed by: INTERNAL MEDICINE

## 2020-11-25 NOTE — PROGRESS NOTES
Cancer Center Progress Note    Problem List:      Patient Active Problem List:     Dysmenorrhea     Chest pain, unspecified     Insomnia, unspecified     Acute pharyngitis     Lumbago     Headache(784.0)     Leukocytopenia     Umbilical hernia without ment Constitutional: Negative for anorexia, fevers, chills, night sweats and weight loss. Eyes: Negative for visual disturbance, irritation and redness. Respiratory: Negative for cough, hemoptysis, chest pain, or dyspnea.   Cardiovascular: Negative for angin choked on food   • Breast Cancer Self 45       Allergies:     No Known Allergies    Medications:  No outpatient medications have been marked as taking for the 11/25/20 encounter (Appointment) with Jeane Duarte MD.        Vital Signs:      Height: --  We 11/18/2020    TP 7.9 11/18/2020     Component   Ref Range & Units 11/17/20 1144   Ferritin   12.0 - 240.0 ng/mL 6.4Low         Radiologic imaging reviewed at this visit:    Mammogram on 3/9/2020:  BREAST COMPOSITION:   Heterogeneously dense, which may obsc

## 2020-12-03 ENCOUNTER — TELEPHONE (OUTPATIENT)
Dept: FAMILY MEDICINE CLINIC | Facility: CLINIC | Age: 50
End: 2020-12-03

## 2020-12-03 ENCOUNTER — OFFICE VISIT (OUTPATIENT)
Dept: HEMATOLOGY/ONCOLOGY | Facility: HOSPITAL | Age: 50
End: 2020-12-03
Attending: INTERNAL MEDICINE
Payer: MEDICAID

## 2020-12-03 VITALS
OXYGEN SATURATION: 100 % | TEMPERATURE: 97 F | BODY MASS INDEX: 22 KG/M2 | SYSTOLIC BLOOD PRESSURE: 97 MMHG | HEART RATE: 70 BPM | DIASTOLIC BLOOD PRESSURE: 71 MMHG | WEIGHT: 128.38 LBS | RESPIRATION RATE: 18 BRPM

## 2020-12-03 DIAGNOSIS — E53.8 FOLIC ACID DEFICIENCY: Primary | ICD-10-CM

## 2020-12-03 DIAGNOSIS — D05.11 DUCTAL CARCINOMA IN SITU (DCIS) OF RIGHT BREAST: ICD-10-CM

## 2020-12-03 DIAGNOSIS — D50.0 IRON DEFICIENCY ANEMIA DUE TO CHRONIC BLOOD LOSS: ICD-10-CM

## 2020-12-03 PROCEDURE — 96365 THER/PROPH/DIAG IV INF INIT: CPT

## 2020-12-03 NOTE — TELEPHONE ENCOUNTER
Pt stopped by [facility] office and said she is feeling better after taking   predniSONE 10 MG Oral Tab 33 tablet

## 2020-12-03 NOTE — TELEPHONE ENCOUNTER
Please give patient a call to ask normal questions she had a rash all over the body we gave her prednisone prescription please ask if the rash completely went away?

## 2020-12-03 NOTE — TELEPHONE ENCOUNTER
Pt states her rash is gone on her legs,back and arms, not fully gone on her trunk, pt wants to know if she needs to see Derm

## 2020-12-04 NOTE — TELEPHONE ENCOUNTER
She may set up an appointment with dermatologist just in case of the rash would not completely resolve. I am very happy to hear that the steroid helped.    Thank you

## 2020-12-04 NOTE — TELEPHONE ENCOUNTER
Pt informed of new instructions and pt will f/u with derm, Dr Otis Mckinney infor given to pt per Dr Kirk Romano request. Pt voice understanding.

## 2021-03-02 ENCOUNTER — HOSPITAL ENCOUNTER (OUTPATIENT)
Age: 51
Discharge: HOME | End: 2021-03-02
Attending: EMERGENCY MEDICINE
Payer: MEDICAID

## 2021-03-02 ENCOUNTER — APPOINTMENT (OUTPATIENT)
Dept: ULTRASOUND IMAGING | Age: 51
End: 2021-03-02
Attending: PHYSICIAN ASSISTANT
Payer: MEDICAID

## 2021-03-02 ENCOUNTER — APPOINTMENT (OUTPATIENT)
Dept: GENERAL RADIOLOGY | Age: 51
End: 2021-03-02
Attending: PHYSICIAN ASSISTANT
Payer: MEDICAID

## 2021-03-02 VITALS
DIASTOLIC BLOOD PRESSURE: 80 MMHG | HEART RATE: 79 BPM | SYSTOLIC BLOOD PRESSURE: 107 MMHG | RESPIRATION RATE: 18 BRPM | TEMPERATURE: 98 F | OXYGEN SATURATION: 100 %

## 2021-03-02 DIAGNOSIS — R59.0 LEFT CERVICAL LYMPHADENOPATHY: Primary | ICD-10-CM

## 2021-03-02 DIAGNOSIS — Z85.3 HX OF BREAST CANCER: ICD-10-CM

## 2021-03-02 DIAGNOSIS — R51.9 ACUTE NONINTRACTABLE HEADACHE, UNSPECIFIED HEADACHE TYPE: ICD-10-CM

## 2021-03-02 DIAGNOSIS — M79.89 LEFT ARM SWELLING: ICD-10-CM

## 2021-03-02 LAB
#MXD IC: 0.4 X10ˆ3/UL (ref 0.1–1)
ATRIAL RATE: 93 BPM
CREAT BLD-MCNC: 0.5 MG/DL
GLUCOSE BLD-MCNC: 135 MG/DL (ref 70–99)
HCT VFR BLD AUTO: 42.8 %
HGB BLD-MCNC: 14.3 G/DL
ISTAT BUN: 8 MG/DL (ref 7–18)
ISTAT CHLORIDE: 101 MMOL/L (ref 98–112)
ISTAT HEMATOCRIT: 43 %
ISTAT IONIZED CALCIUM FOR CHEM 8: 1.25 MMOL/L (ref 1.12–1.32)
ISTAT POTASSIUM: 3.7 MMOL/L (ref 3.6–5.1)
ISTAT SODIUM: 140 MMOL/L (ref 136–145)
ISTAT TCO2: 29 MMOL/L (ref 21–32)
LYMPHOCYTES # BLD AUTO: 1.7 X10ˆ3/UL (ref 1–4)
LYMPHOCYTES NFR BLD AUTO: 51.1 %
MCH RBC QN AUTO: 29.3 PG (ref 26–34)
MCHC RBC AUTO-ENTMCNC: 33.4 G/DL (ref 31–37)
MCV RBC AUTO: 87.7 FL (ref 80–100)
MIXED CELL %: 12.4 %
NEUTROPHILS # BLD AUTO: 1.2 X10ˆ3/UL (ref 1.5–7.7)
NEUTROPHILS NFR BLD AUTO: 36.5 %
P AXIS: 61 DEGREES
P-R INTERVAL: 156 MS
PLATELET # BLD AUTO: 275 X10ˆ3/UL (ref 150–450)
Q-T INTERVAL: 370 MS
QRS DURATION: 70 MS
QTC CALCULATION (BEZET): 460 MS
R AXIS: 84 DEGREES
RBC # BLD AUTO: 4.88 X10ˆ6/UL
SARS-COV-2 RNA RESP QL NAA+PROBE: NOT DETECTED
T AXIS: 55 DEGREES
TROPONIN I BLD-MCNC: <0.02 NG/ML
VENTRICULAR RATE: 93 BPM
WBC # BLD AUTO: 3.3 X10ˆ3/UL (ref 4–11)

## 2021-03-02 PROCEDURE — 36415 COLL VENOUS BLD VENIPUNCTURE: CPT

## 2021-03-02 PROCEDURE — 93010 ELECTROCARDIOGRAM REPORT: CPT

## 2021-03-02 PROCEDURE — 93005 ELECTROCARDIOGRAM TRACING: CPT

## 2021-03-02 PROCEDURE — 99215 OFFICE O/P EST HI 40 MIN: CPT

## 2021-03-02 PROCEDURE — 93971 EXTREMITY STUDY: CPT | Performed by: PHYSICIAN ASSISTANT

## 2021-03-02 PROCEDURE — 84484 ASSAY OF TROPONIN QUANT: CPT

## 2021-03-02 PROCEDURE — 99214 OFFICE O/P EST MOD 30 MIN: CPT

## 2021-03-02 PROCEDURE — 85025 COMPLETE CBC W/AUTO DIFF WBC: CPT | Performed by: PHYSICIAN ASSISTANT

## 2021-03-02 PROCEDURE — 80047 BASIC METABLC PNL IONIZED CA: CPT

## 2021-03-02 PROCEDURE — 71046 X-RAY EXAM CHEST 2 VIEWS: CPT | Performed by: PHYSICIAN ASSISTANT

## 2021-03-02 NOTE — ED PROVIDER NOTES
Patient Seen in: [facility] [place]      History   Patient presents with:  Headache  Bump  Swelling  Chest Pain    Stated Complaint: BUMP/ARM SWELLING    HPI/Subjective:   HPI    44-year-old female with past medical history of anxiety, headaches, 2015                Social History    Tobacco Use      Smoking status: Never Smoker      Smokeless tobacco: Never Used    Alcohol use: No      Alcohol/week: 0.0 standard drinks      Frequency: Never    Drug use:  No             Review of Systems    Positive Cervical: Cervical adenopathy present. Left cervical: Superficial cervical adenopathy present. Comments: Left-sided lymphadenopathy approximately 1 cm, mobile nontender   Skin:     General: Skin is warm and dry.       Coloration: Skin is not (CPT=93971)  COMPARISON:  None. INDICATIONS:  BUMP/ARM SWELLING  TECHNIQUE:  Real time, grey scale, and duplex ultrasound was used to evaluate the upper extremity venous system.  B-mode two-dimensional images of the vascular structures, Doppler spectral an like her to follow-up for recheck of her white blood cell count. Patient verbalizes understanding of this. If she were to develop any worsening symptoms go to the emergency room.          The patient is in good condition throughout her visit today and rem

## 2021-03-02 NOTE — ED INITIAL ASSESSMENT (HPI)
C/o constant headache since yesterday with bump to left side of the neck. Left forearm swelling and feels heavy for 2 months. Also with chest pain intermittently since Jan. 2020. [facility] at [facility] ED and told \"can't find anything. \" Epis

## 2021-03-02 NOTE — ED PROVIDER NOTES
I reviewed that chart and discussed the case.   I have examined the patient and noted 46-year-old female past medical history of anxiety, headaches, DJD, hiatal hernia, breast cancer status post right lobectomy no chemo or radiation who presents with multip Hemoglobin 14.3. Platelet 562. BMP glucose slightly elevated 135. Troponin was negative. Recommend outpatient follow-up with primary care. Possible node biopsy given cancer history. Patient discharged home in good condition.       I agree with the

## 2021-03-05 ENCOUNTER — OFFICE VISIT (OUTPATIENT)
Dept: FAMILY MEDICINE CLINIC | Facility: CLINIC | Age: 51
End: 2021-03-05
Payer: MEDICAID

## 2021-03-05 VITALS
SYSTOLIC BLOOD PRESSURE: 110 MMHG | HEART RATE: 77 BPM | DIASTOLIC BLOOD PRESSURE: 64 MMHG | RESPIRATION RATE: 16 BRPM | TEMPERATURE: 98 F | BODY MASS INDEX: 21.85 KG/M2 | OXYGEN SATURATION: 99 % | WEIGHT: 128 LBS | HEIGHT: 64 IN

## 2021-03-05 DIAGNOSIS — Z85.3 HISTORY OF RIGHT BREAST CANCER: ICD-10-CM

## 2021-03-05 DIAGNOSIS — R51.9 NEW ONSET HEADACHE: ICD-10-CM

## 2021-03-05 DIAGNOSIS — R22.32 LOCALIZED SWELLING OF LEFT FOREARM: ICD-10-CM

## 2021-03-05 DIAGNOSIS — D72.819 LEUKOPENIA, UNSPECIFIED TYPE: ICD-10-CM

## 2021-03-05 DIAGNOSIS — R59.0 LYMPHADENOPATHY OF HEAD AND NECK REGION: Primary | ICD-10-CM

## 2021-03-05 PROCEDURE — 3074F SYST BP LT 130 MM HG: CPT | Performed by: FAMILY MEDICINE

## 2021-03-05 PROCEDURE — 3008F BODY MASS INDEX DOCD: CPT | Performed by: FAMILY MEDICINE

## 2021-03-05 PROCEDURE — 99214 OFFICE O/P EST MOD 30 MIN: CPT | Performed by: FAMILY MEDICINE

## 2021-03-05 PROCEDURE — 3078F DIAST BP <80 MM HG: CPT | Performed by: FAMILY MEDICINE

## 2021-03-05 NOTE — PROGRESS NOTES
Lexy Wilks is a 52year old female. cc swelling of the forearm, palpable lymph nodes, headache,  HPI:   Patient is coming to the office for urgent care  follow-up visit from March 2, 2021.   Patient went to urgent care after she noticed that she had palp • Vaginitis and vulvovaginitis, unspecified       Social History:  Social History    Tobacco Use      Smoking status: Never Smoker      Smokeless tobacco: Never Used    Alcohol use: No      Alcohol/week: 0.0 standard drinks      Frequency: Never    Drug 12-LEAD   Result Value Ref Range    Ventricular rate 93 BPM    Atrial rate 93 BPM    P-R Interval 156 ms    QRS Duration 70 ms    Q-T Interval 370 ms    QTC Calculation (Bezet) 460 ms    P Axis 61 degrees    R Axis 84 degrees    T Axis 55 degrees   POCT IS structures, Doppler spectral analysis, and color flow. Doppler imaging were performed. The   following veins were imaged:  Subclavian, Jugular, Axillary, Brachial, Basilic, Cephalic, and the contralateral Subclavian and Jugular.      PATIENT STATED HISTOR issues and agrees to the plan. The patient is asked to return in TBD after tests. The note was dictated using speech recognition software. Accuracy and grammar in transcription may be subject to error.

## 2021-03-05 NOTE — PATIENT INSTRUCTIONS
Call [phone] to schedule  ultrasound of the neck and CT scan of the brain. Keep good hydration. Healthy diet. Okay for Tylenol ibuprofen as needed for pain.

## 2021-03-17 ENCOUNTER — TELEPHONE (OUTPATIENT)
Dept: FAMILY MEDICINE CLINIC | Facility: CLINIC | Age: 51
End: 2021-03-17

## 2021-03-17 DIAGNOSIS — Z98.82 HISTORY OF BREAST AUGMENTATION: Primary | ICD-10-CM

## 2021-03-17 DIAGNOSIS — Z12.31 ENCOUNTER FOR SCREENING MAMMOGRAM FOR BREAST CANCER: ICD-10-CM

## 2021-03-19 NOTE — TELEPHONE ENCOUNTER
Please have the patient schedule complete physical so we can complete her breast examination. Her previous mammogram was ordered by the breast surgeon Dr. Adams. Patient has history of breast cancer.   I have signed an order for mammogram but I want to m

## 2021-03-23 ENCOUNTER — HOSPITAL ENCOUNTER (OUTPATIENT)
Dept: CT IMAGING | Facility: HOSPITAL | Age: 51
Discharge: HOME | End: 2021-03-23
Attending: FAMILY MEDICINE
Payer: MEDICAID

## 2021-03-23 ENCOUNTER — HOSPITAL ENCOUNTER (OUTPATIENT)
Dept: ULTRASOUND IMAGING | Facility: HOSPITAL | Age: 51
Discharge: HOME | End: 2021-03-23
Attending: FAMILY MEDICINE
Payer: MEDICAID

## 2021-03-23 DIAGNOSIS — R59.0 LYMPHADENOPATHY OF HEAD AND NECK REGION: ICD-10-CM

## 2021-03-23 DIAGNOSIS — R51.9 NEW ONSET HEADACHE: ICD-10-CM

## 2021-03-23 DIAGNOSIS — Z85.3 HISTORY OF RIGHT BREAST CANCER: ICD-10-CM

## 2021-03-23 PROCEDURE — 70450 CT HEAD/BRAIN W/O DYE: CPT | Performed by: FAMILY MEDICINE

## 2021-03-23 PROCEDURE — 76536 US EXAM OF HEAD AND NECK: CPT | Performed by: FAMILY MEDICINE

## 2021-03-29 ENCOUNTER — HOSPITAL ENCOUNTER (OUTPATIENT)
Dept: MAMMOGRAPHY | Facility: HOSPITAL | Age: 51
Discharge: HOME | End: 2021-03-29
Attending: FAMILY MEDICINE
Payer: MEDICAID

## 2021-03-29 DIAGNOSIS — Z98.890 HISTORY OF LUMPECTOMY: ICD-10-CM

## 2021-03-29 DIAGNOSIS — Z98.82 HISTORY OF BREAST AUGMENTATION: ICD-10-CM

## 2021-03-29 DIAGNOSIS — Z12.31 ENCOUNTER FOR SCREENING MAMMOGRAM FOR BREAST CANCER: ICD-10-CM

## 2021-03-29 PROCEDURE — 76642 ULTRASOUND BREAST LIMITED: CPT | Performed by: FAMILY MEDICINE

## 2021-03-29 PROCEDURE — 77062 BREAST TOMOSYNTHESIS BI: CPT | Performed by: FAMILY MEDICINE

## 2021-03-29 PROCEDURE — 77066 DX MAMMO INCL CAD BI: CPT | Performed by: FAMILY MEDICINE

## 2021-04-12 ENCOUNTER — OFFICE VISIT (OUTPATIENT)
Dept: FAMILY MEDICINE CLINIC | Facility: CLINIC | Age: 51
End: 2021-04-12
Payer: MEDICAID

## 2021-04-12 VITALS
DIASTOLIC BLOOD PRESSURE: 66 MMHG | HEIGHT: 64 IN | OXYGEN SATURATION: 98 % | TEMPERATURE: 99 F | HEART RATE: 86 BPM | RESPIRATION RATE: 16 BRPM | SYSTOLIC BLOOD PRESSURE: 100 MMHG | BODY MASS INDEX: 21 KG/M2 | WEIGHT: 123 LBS

## 2021-04-12 DIAGNOSIS — Z00.00 PHYSICAL EXAM, ANNUAL: Primary | ICD-10-CM

## 2021-04-12 DIAGNOSIS — Z00.00 LABORATORY EXAMINATION ORDERED AS PART OF A ROUTINE GENERAL MEDICAL EXAMINATION: ICD-10-CM

## 2021-04-12 DIAGNOSIS — Z13.89 SCREENING FOR GENITOURINARY CONDITION: ICD-10-CM

## 2021-04-12 DIAGNOSIS — R07.9 CHEST PAIN, UNSPECIFIED TYPE: ICD-10-CM

## 2021-04-12 DIAGNOSIS — F41.9 ANXIETY: ICD-10-CM

## 2021-04-12 PROCEDURE — 99213 OFFICE O/P EST LOW 20 MIN: CPT | Performed by: FAMILY MEDICINE

## 2021-04-12 PROCEDURE — 3078F DIAST BP <80 MM HG: CPT | Performed by: FAMILY MEDICINE

## 2021-04-12 PROCEDURE — 3008F BODY MASS INDEX DOCD: CPT | Performed by: FAMILY MEDICINE

## 2021-04-12 PROCEDURE — 99396 PREV VISIT EST AGE 40-64: CPT | Performed by: FAMILY MEDICINE

## 2021-04-12 PROCEDURE — 3074F SYST BP LT 130 MM HG: CPT | Performed by: FAMILY MEDICINE

## 2021-04-12 RX ORDER — VENLAFAXINE HYDROCHLORIDE 37.5 MG/1
37.5 CAPSULE, EXTENDED RELEASE ORAL DAILY
Qty: 30 CAPSULE | Refills: 1 | Status: SHIPPED | OUTPATIENT
Start: 2021-04-12 | End: 2021-05-12

## 2021-04-12 NOTE — PROGRESS NOTES
HPI:   Rosita Silva is a 52year old female who presents for a complete physical exam. Symptoms: denies discharge, itching, burning or dysuria. Patient stated that she has some chest pain for the last 3 days when she is running.   She is usually runni 01/16/2020 18   06/04/2019 21   02/20/2014 11 (L)   12/16/2013 10 (L)   03/31/2011 15   03/28/2011 50 (H)     ALT (U/L)   Date Value   11/18/2020 33   01/16/2020 24   06/04/2019 37   02/20/2014 20   12/16/2013 21   03/31/2011 21   03/28/2011 63 (H) double vision  HEENT: denies nasal congestion, sinus pain or ST  LUNGS: denies shortness of breath with exertion  CARDIOVASCULAR:  chest pain on exertion when running  GI: denies abdominal pain,denies heartburn  : denies dysuria, vaginal discharge or itc Testing by PCR ([place])      Meds & Refills for this Visit:  Requested Prescriptions     Signed Prescriptions Disp Refills   • Venlafaxine HCl ER 37.5 MG Oral Capsule SR 24 Hr 30 capsule 1     Sig: Take 1 capsule (37.5 mg total) by mouth daily.    Call [phone]

## 2021-04-12 NOTE — PATIENT INSTRUCTIONS
Call [phone] to schedule chest x-ray and stress test.  Healthy diet. Keep good hydration. Start venlafaxine 37.5 mg 1 tablet daily. Follow-up in the office in 1 month.

## 2021-04-24 ENCOUNTER — LAB ENCOUNTER (OUTPATIENT)
Dept: LAB | Facility: HOSPITAL | Age: 51
End: 2021-04-24
Attending: FAMILY MEDICINE
Payer: MEDICAID

## 2021-04-24 DIAGNOSIS — R07.9 CHEST PAIN, UNSPECIFIED TYPE: ICD-10-CM

## 2021-04-27 ENCOUNTER — HOSPITAL ENCOUNTER (OUTPATIENT)
Dept: GENERAL RADIOLOGY | Facility: HOSPITAL | Age: 51
Discharge: HOME | End: 2021-04-27
Attending: FAMILY MEDICINE
Payer: MEDICAID

## 2021-04-27 ENCOUNTER — HOSPITAL ENCOUNTER (OUTPATIENT)
Dept: CV DIAGNOSTICS | Facility: HOSPITAL | Age: 51
Discharge: HOME | End: 2021-04-27
Attending: FAMILY MEDICINE
Payer: MEDICAID

## 2021-04-27 ENCOUNTER — LAB ENCOUNTER (OUTPATIENT)
Dept: LAB | Age: 51
End: 2021-04-27
Attending: FAMILY MEDICINE
Payer: MEDICAID

## 2021-04-27 DIAGNOSIS — T14.8XXA BRUISING: ICD-10-CM

## 2021-04-27 DIAGNOSIS — R21 RASH AND NONSPECIFIC SKIN ERUPTION: ICD-10-CM

## 2021-04-27 DIAGNOSIS — R73.9 HYPERGLYCEMIA: ICD-10-CM

## 2021-04-27 DIAGNOSIS — Z13.89 SCREENING FOR GENITOURINARY CONDITION: ICD-10-CM

## 2021-04-27 DIAGNOSIS — R07.9 CHEST PAIN, UNSPECIFIED TYPE: ICD-10-CM

## 2021-04-27 DIAGNOSIS — R73.9 HYPERGLYCEMIA: Primary | ICD-10-CM

## 2021-04-27 DIAGNOSIS — Z00.00 LABORATORY EXAMINATION ORDERED AS PART OF A ROUTINE GENERAL MEDICAL EXAMINATION: ICD-10-CM

## 2021-04-27 PROCEDURE — 84443 ASSAY THYROID STIM HORMONE: CPT

## 2021-04-27 PROCEDURE — 36415 COLL VENOUS BLD VENIPUNCTURE: CPT

## 2021-04-27 PROCEDURE — 80061 LIPID PANEL: CPT

## 2021-04-27 PROCEDURE — 93350 STRESS TTE ONLY: CPT | Performed by: FAMILY MEDICINE

## 2021-04-27 PROCEDURE — 85652 RBC SED RATE AUTOMATED: CPT

## 2021-04-27 PROCEDURE — 80053 COMPREHEN METABOLIC PANEL: CPT

## 2021-04-27 PROCEDURE — 71046 X-RAY EXAM CHEST 2 VIEWS: CPT | Performed by: FAMILY MEDICINE

## 2021-04-27 PROCEDURE — 83036 HEMOGLOBIN GLYCOSYLATED A1C: CPT

## 2021-04-27 PROCEDURE — 85025 COMPLETE CBC W/AUTO DIFF WBC: CPT

## 2021-04-27 PROCEDURE — 93018 CV STRESS TEST I&R ONLY: CPT | Performed by: FAMILY MEDICINE

## 2021-04-27 PROCEDURE — 81003 URINALYSIS AUTO W/O SCOPE: CPT

## 2021-04-27 PROCEDURE — 93017 CV STRESS TEST TRACING ONLY: CPT | Performed by: FAMILY MEDICINE

## 2021-05-12 ENCOUNTER — OFFICE VISIT (OUTPATIENT)
Dept: FAMILY MEDICINE CLINIC | Facility: CLINIC | Age: 51
End: 2021-05-12
Payer: MEDICAID

## 2021-05-12 VITALS
HEART RATE: 84 BPM | BODY MASS INDEX: 20.78 KG/M2 | HEIGHT: 64 IN | OXYGEN SATURATION: 98 % | DIASTOLIC BLOOD PRESSURE: 64 MMHG | WEIGHT: 121.75 LBS | RESPIRATION RATE: 16 BRPM | TEMPERATURE: 98 F | SYSTOLIC BLOOD PRESSURE: 106 MMHG

## 2021-05-12 DIAGNOSIS — J02.9 SORE THROAT: ICD-10-CM

## 2021-05-12 DIAGNOSIS — R30.0 DYSURIA: Primary | ICD-10-CM

## 2021-05-12 DIAGNOSIS — F41.9 ANXIETY: ICD-10-CM

## 2021-05-12 DIAGNOSIS — R59.0 LYMPHADENOPATHY, CERVICAL: ICD-10-CM

## 2021-05-12 DIAGNOSIS — D72.819 LEUKOPENIA, UNSPECIFIED TYPE: ICD-10-CM

## 2021-05-12 DIAGNOSIS — R73.9 HYPERGLYCEMIA: ICD-10-CM

## 2021-05-12 PROBLEM — E04.2 MULTINODULAR GOITER: Status: ACTIVE | Noted: 2019-10-14

## 2021-05-12 PROCEDURE — 87086 URINE CULTURE/COLONY COUNT: CPT | Performed by: FAMILY MEDICINE

## 2021-05-12 PROCEDURE — 81003 URINALYSIS AUTO W/O SCOPE: CPT | Performed by: FAMILY MEDICINE

## 2021-05-12 PROCEDURE — 3008F BODY MASS INDEX DOCD: CPT | Performed by: FAMILY MEDICINE

## 2021-05-12 PROCEDURE — 99214 OFFICE O/P EST MOD 30 MIN: CPT | Performed by: FAMILY MEDICINE

## 2021-05-12 PROCEDURE — 3074F SYST BP LT 130 MM HG: CPT | Performed by: FAMILY MEDICINE

## 2021-05-12 PROCEDURE — 3078F DIAST BP <80 MM HG: CPT | Performed by: FAMILY MEDICINE

## 2021-05-12 RX ORDER — VENLAFAXINE HYDROCHLORIDE 37.5 MG/1
37.5 CAPSULE, EXTENDED RELEASE ORAL DAILY
Qty: 30 CAPSULE | Refills: 2 | Status: SHIPPED | OUTPATIENT
Start: 2021-05-12 | End: 2021-06-17

## 2021-05-12 RX ORDER — AMOXICILLIN AND CLAVULANATE POTASSIUM 875; 125 MG/1; MG/1
1 TABLET, FILM COATED ORAL 2 TIMES DAILY
Qty: 20 TABLET | Refills: 0 | Status: SHIPPED | OUTPATIENT
Start: 2021-05-12 | End: 2021-05-22

## 2021-05-12 RX ORDER — PHENAZOPYRIDINE HYDROCHLORIDE 100 MG/1
200 TABLET, FILM COATED ORAL 3 TIMES DAILY PRN
Qty: 12 TABLET | Refills: 0 | Status: SHIPPED | OUTPATIENT
Start: 2021-05-12 | End: 2021-06-05 | Stop reason: ALTCHOICE

## 2021-05-12 NOTE — PATIENT INSTRUCTIONS
Start antibiotic today per directions. Take probiotic over-the-counter daily like organic yogurt while taking antibiotic. Drink plenty of fluids. Nasal spray saline over-the-counter as needed. Continue Flonase 2 sprays nostril once a day.   Use ibupro

## 2021-05-12 NOTE — PROGRESS NOTES
Rosita Silva is a 52year old female. cc results, dysuria, sore throat  HPI:   Patient is here to discuss test results with her physical she said that she has chest pain.   Stress test came back normal.  Blood work showed blood sugar we will recheck the te Tab Take by mouth.         Past Medical History:   Diagnosis Date   • Anxiety state, unspecified    [facility]    DCIS   • Depression    • DJD (degenerative joint disease), lumbar    • Headache(784.0)    • Hiatal hernia    • Insomnia, unspeci Urine Negative Negative mg/dL    Bilirubin Urine Negative Negative    Ketones, UA Negative Negative - Trace mg/dL    Spec Gravity 1.020 1.005 - 1.030    Blood Urine Negative Negative    PH Urine 8.5 (A) 5.0 - 8.0    Protein Urine Negative Negative - Trace pelvic discomfort follow-up with your GYN. Follow-up for med check visit in 3 months. Imaging & Consults:  None    The patient indicates understanding of these issues and agrees to the plan.   The patient is asked to return in 3 months, or sooner depend

## 2021-05-30 ENCOUNTER — HOSPITAL ENCOUNTER (OUTPATIENT)
Age: 51
Discharge: HOME | End: 2021-05-30
Payer: MEDICAID

## 2021-05-30 VITALS
DIASTOLIC BLOOD PRESSURE: 83 MMHG | HEART RATE: 75 BPM | TEMPERATURE: 98 F | RESPIRATION RATE: 20 BRPM | WEIGHT: 119 LBS | SYSTOLIC BLOOD PRESSURE: 122 MMHG | BODY MASS INDEX: 20.32 KG/M2 | HEIGHT: 64 IN | OXYGEN SATURATION: 99 %

## 2021-05-30 DIAGNOSIS — B37.3 VAGINAL CANDIDIASIS: ICD-10-CM

## 2021-05-30 DIAGNOSIS — J01.90 ACUTE SINUSITIS, RECURRENCE NOT SPECIFIED, UNSPECIFIED LOCATION: Primary | ICD-10-CM

## 2021-05-30 PROCEDURE — 99213 OFFICE O/P EST LOW 20 MIN: CPT

## 2021-05-30 RX ORDER — TERCONAZOLE 80 MG/1
80 SUPPOSITORY VAGINAL NIGHTLY
Qty: 3 SUPPOSITORY | Refills: 0 | Status: SHIPPED | OUTPATIENT
Start: 2021-05-30 | End: 2021-06-02

## 2021-05-30 RX ORDER — AMOXICILLIN AND CLAVULANATE POTASSIUM 875; 125 MG/1; MG/1
1 TABLET, FILM COATED ORAL 2 TIMES DAILY
Qty: 10 TABLET | Refills: 0 | Status: SHIPPED | OUTPATIENT
Start: 2021-05-30 | End: 2021-06-04

## 2021-05-30 RX ORDER — METHYLPREDNISOLONE 4 MG/1
TABLET ORAL
Qty: 1 EACH | Refills: 0 | Status: SHIPPED | OUTPATIENT
Start: 2021-05-30 | End: 2021-06-05 | Stop reason: ALTCHOICE

## 2021-05-30 NOTE — ED INITIAL ASSESSMENT (HPI)
Pt presents today with c/o bilateral ear pain x 3 weeks. Pt denies any fevers. Pt states that she was on antibiotics for this and finished them but still has her symptoms. Pt also has c/o left side sinus/facial pain and headache.

## 2021-05-30 NOTE — ED PROVIDER NOTES
Patient Seen in: [facility] [place]      History   Patient presents with:  Ear Pain  Sinus Problem    Stated Complaint: ear pain    HPI/Subjective:   HPI  Patient is 70-year-old female past medical history of anxiety, hiatal hernia, DJD, depressi No             Review of Systems    Positive for stated complaint: ear pain  Other systems are as noted in HPI. Constitutional and vital signs reviewed. All other systems reviewed and negative except as noted above.     Physical Exam     ED Triage Vit Status: She is alert.    Psychiatric:         Mood and Affect: Mood normal.         Behavior: Behavior normal.                 ED Course   Labs Reviewed - No data to display                    MDM     Impression; sinusitis/vaginal candidiasis  Plan; Augment

## 2021-06-03 ENCOUNTER — TELEPHONE (OUTPATIENT)
Dept: FAMILY MEDICINE CLINIC | Facility: CLINIC | Age: 51
End: 2021-06-03

## 2021-06-03 NOTE — TELEPHONE ENCOUNTER
Pt was sen 5/12  Had ear pain. She was given abx but it did not get better [facility] given more abx she is still having ear pain they told her dhe has fluid in the ear. She would like to know what to do next please advise.  Thank you

## 2021-06-03 NOTE — TELEPHONE ENCOUNTER
I spoke to Dr. Regena Duverney and he cancelled the order for the medrol dose paolo. I left a message for pt to call back tomorrow. Dr. Regena Duverney recommended patient increase her fluticasone to 2 sprays each nares twice daily.  Since pt is finishing her medrol dose

## 2021-06-03 NOTE — TELEPHONE ENCOUNTER
Dr. Dorene Castaneda pt. I spoke with pt. [facility] was [facility] the [facility] . She was given Amoxicillin-Pot Clavulanate 875-125 MG 1 tablet Oral 2 times daily and a medrol dose paolo. She does not have ear pain anymore  but she has a lot of ear  pressure.  She feels lik

## 2021-06-04 NOTE — TELEPHONE ENCOUNTER
Pt called back. Advised of below. She sts she is not having sinus pressure, it is her ear. Agrees to appt.

## 2021-06-05 ENCOUNTER — OFFICE VISIT (OUTPATIENT)
Dept: FAMILY MEDICINE CLINIC | Facility: CLINIC | Age: 51
End: 2021-06-05
Payer: MEDICAID

## 2021-06-05 ENCOUNTER — LAB ENCOUNTER (OUTPATIENT)
Dept: LAB | Facility: HOSPITAL | Age: 51
End: 2021-06-05
Attending: FAMILY MEDICINE
Payer: MEDICAID

## 2021-06-05 ENCOUNTER — HOSPITAL ENCOUNTER (OUTPATIENT)
Dept: GENERAL RADIOLOGY | Facility: HOSPITAL | Age: 51
Discharge: HOME | End: 2021-06-05
Attending: FAMILY MEDICINE
Payer: MEDICAID

## 2021-06-05 VITALS
SYSTOLIC BLOOD PRESSURE: 98 MMHG | HEIGHT: 64 IN | BODY MASS INDEX: 20.32 KG/M2 | RESPIRATION RATE: 16 BRPM | HEART RATE: 80 BPM | WEIGHT: 119 LBS | DIASTOLIC BLOOD PRESSURE: 68 MMHG | TEMPERATURE: 98 F

## 2021-06-05 DIAGNOSIS — R61 EXCESSIVE SWEATING: ICD-10-CM

## 2021-06-05 DIAGNOSIS — R51.9 DAILY HEADACHE: ICD-10-CM

## 2021-06-05 DIAGNOSIS — H92.03 EAR PAIN, BILATERAL: Primary | ICD-10-CM

## 2021-06-05 DIAGNOSIS — D05.11 DUCTAL CARCINOMA IN SITU (DCIS) OF RIGHT BREAST: ICD-10-CM

## 2021-06-05 DIAGNOSIS — R73.9 HYPERGLYCEMIA: ICD-10-CM

## 2021-06-05 DIAGNOSIS — H92.03 EAR PAIN, BILATERAL: ICD-10-CM

## 2021-06-05 DIAGNOSIS — D50.0 IRON DEFICIENCY ANEMIA DUE TO CHRONIC BLOOD LOSS: ICD-10-CM

## 2021-06-05 DIAGNOSIS — N92.1 MENORRHAGIA WITH IRREGULAR CYCLE: ICD-10-CM

## 2021-06-05 DIAGNOSIS — N89.8 VAGINAL DISCHARGE: ICD-10-CM

## 2021-06-05 PROCEDURE — 86140 C-REACTIVE PROTEIN: CPT

## 2021-06-05 PROCEDURE — 3078F DIAST BP <80 MM HG: CPT | Performed by: FAMILY MEDICINE

## 2021-06-05 PROCEDURE — 71046 X-RAY EXAM CHEST 2 VIEWS: CPT | Performed by: FAMILY MEDICINE

## 2021-06-05 PROCEDURE — 3008F BODY MASS INDEX DOCD: CPT | Performed by: FAMILY MEDICINE

## 2021-06-05 PROCEDURE — 85025 COMPLETE CBC W/AUTO DIFF WBC: CPT

## 2021-06-05 PROCEDURE — 83001 ASSAY OF GONADOTROPIN (FSH): CPT

## 2021-06-05 PROCEDURE — 85652 RBC SED RATE AUTOMATED: CPT

## 2021-06-05 PROCEDURE — 3074F SYST BP LT 130 MM HG: CPT | Performed by: FAMILY MEDICINE

## 2021-06-05 PROCEDURE — 99214 OFFICE O/P EST MOD 30 MIN: CPT | Performed by: FAMILY MEDICINE

## 2021-06-05 PROCEDURE — 36415 COLL VENOUS BLD VENIPUNCTURE: CPT

## 2021-06-05 PROCEDURE — 82728 ASSAY OF FERRITIN: CPT

## 2021-06-05 PROCEDURE — 80053 COMPREHEN METABOLIC PANEL: CPT

## 2021-06-05 RX ORDER — FLUTICASONE PROPIONATE 50 MCG
2 SPRAY, SUSPENSION (ML) NASAL DAILY
COMMUNITY
End: 2021-11-02 | Stop reason: ALTCHOICE

## 2021-06-05 RX ORDER — FLUCONAZOLE 150 MG/1
150 TABLET ORAL ONCE
Qty: 1 TABLET | Refills: 0 | Status: SHIPPED | OUTPATIENT
Start: 2021-06-05 | End: 2021-06-05

## 2021-06-05 NOTE — PROGRESS NOTES
Ny Ferguson is a 54year old female. cc bilateral ear pain headache, excessive sweating, vaginal discharge  HPI:   Patient came to the office complaining of having bilateral ear pain. She is coming for urgent care follow-up visit from May 30th 2021.   Ang difficile    • Multinodular goiter    • Vaginitis and vulvovaginitis, unspecified       Social History:  Social History    Tobacco Use      Smoking status: Never Smoker      Smokeless tobacco: Never Used    Vaping Use      Vaping Use: Never used    Alcohol discharge    Orders Placed This Encounter      Comp Metabolic Panel (14)      CBC With Differential With Platelet      Sed Rate, Frankren (Automated) [E]      C-Reactive Protein [E]      [facility] [E]      Meds & Refills for this Visit:  Requested Prescription Accuracy and grammar in transcription may be subject to error.

## 2021-06-05 NOTE — PATIENT INSTRUCTIONS
Start using Allegra 180 mg 1 tablet daily. Continue Zyrtec Flonase 2 sprays nostril once a day. Use ibuprofen as needed for headache. Try to prop yourself up a little bit higher at night. See ENT doctor for evaluation.   Do chest x-ray and blood work

## 2021-06-08 ENCOUNTER — TELEPHONE (OUTPATIENT)
Dept: FAMILY MEDICINE CLINIC | Facility: CLINIC | Age: 51
End: 2021-06-08

## 2021-06-08 DIAGNOSIS — H92.03 EAR PAIN, BILATERAL: Primary | ICD-10-CM

## 2021-06-08 NOTE — TELEPHONE ENCOUNTER
PT NEEDS REFERRAL TO  [facility] PLEASE FAX  [phone] ONCE THEY GET REFERRAL THEY WILL CALL PT AND SCHEDULE HER SO SHE DOES NOT HAVE THE NAME OF A DR YET. PLEASE ADVISE. THANK YOU.

## 2021-06-08 NOTE — TELEPHONE ENCOUNTER
Patient needs to give us the name of the doctor , referral is placed for the doctor not for organization. Please have the patient to give us a call and we can place referral for her.   Thank you

## 2021-06-14 ENCOUNTER — TELEPHONE (OUTPATIENT)
Dept: HEMATOLOGY/ONCOLOGY | Facility: HOSPITAL | Age: 51
End: 2021-06-14

## 2021-06-14 DIAGNOSIS — D05.11 DUCTAL CARCINOMA IN SITU (DCIS) OF RIGHT BREAST: Primary | ICD-10-CM

## 2021-06-14 DIAGNOSIS — D50.0 IRON DEFICIENCY ANEMIA DUE TO CHRONIC BLOOD LOSS: ICD-10-CM

## 2021-06-14 NOTE — TELEPHONE ENCOUNTER
Brianna called. She would like to schedule her lab appointment, but there are no orders in Epic. Please enter lab orders and call patient to let her know she can schedule.

## 2021-06-14 NOTE — TELEPHONE ENCOUNTER
Patient need to know when was her last labs done and patient has cancelled her appointment with Dr. Rodriguez Officer for 6/16/21, Kareemwork checked by PCP.

## 2021-06-14 NOTE — TELEPHONE ENCOUNTER
Spoke with patient. She was asking when she had her last labs drawn. She needs labs every 6months, transferred pt to scheduling to make an appt. She wants to have her labs drawn and then see Dr. Shalonda Curtis.

## 2021-06-16 ENCOUNTER — NURSE ONLY (OUTPATIENT)
Dept: HEMATOLOGY/ONCOLOGY | Facility: HOSPITAL | Age: 51
End: 2021-06-16
Attending: INTERNAL MEDICINE
Payer: MEDICAID

## 2021-06-16 DIAGNOSIS — D05.11 DUCTAL CARCINOMA IN SITU (DCIS) OF RIGHT BREAST: ICD-10-CM

## 2021-06-16 DIAGNOSIS — D50.0 IRON DEFICIENCY ANEMIA DUE TO CHRONIC BLOOD LOSS: ICD-10-CM

## 2021-06-16 PROCEDURE — 85025 COMPLETE CBC W/AUTO DIFF WBC: CPT

## 2021-06-16 PROCEDURE — 82728 ASSAY OF FERRITIN: CPT

## 2021-06-16 PROCEDURE — 36415 COLL VENOUS BLD VENIPUNCTURE: CPT

## 2021-06-17 RX ORDER — VENLAFAXINE HYDROCHLORIDE 37.5 MG/1
CAPSULE, EXTENDED RELEASE ORAL
Qty: 30 CAPSULE | Refills: 2 | Status: SHIPPED | OUTPATIENT
Start: 2021-06-17 | End: 2021-09-21

## 2021-06-17 NOTE — TELEPHONE ENCOUNTER
LOV: 6/5/21  Future Visit: none  Last Rx: 5/12/21 30 caps 2 refills  Last Labs: 6/5/21  Per protocol to provider

## 2021-06-28 ENCOUNTER — OFFICE VISIT (OUTPATIENT)
Dept: HEMATOLOGY/ONCOLOGY | Facility: HOSPITAL | Age: 51
End: 2021-06-28
Attending: INTERNAL MEDICINE
Payer: MEDICAID

## 2021-06-28 VITALS
HEIGHT: 64.02 IN | RESPIRATION RATE: 16 BRPM | SYSTOLIC BLOOD PRESSURE: 116 MMHG | DIASTOLIC BLOOD PRESSURE: 79 MMHG | BODY MASS INDEX: 21 KG/M2 | WEIGHT: 123 LBS | OXYGEN SATURATION: 97 % | HEART RATE: 82 BPM | TEMPERATURE: 98 F

## 2021-06-28 DIAGNOSIS — D50.0 IRON DEFICIENCY ANEMIA DUE TO CHRONIC BLOOD LOSS: Primary | ICD-10-CM

## 2021-06-28 DIAGNOSIS — D05.11 DUCTAL CARCINOMA IN SITU (DCIS) OF RIGHT BREAST: ICD-10-CM

## 2021-06-28 PROCEDURE — 99213 OFFICE O/P EST LOW 20 MIN: CPT | Performed by: INTERNAL MEDICINE

## 2021-06-28 NOTE — PROGRESS NOTES
Cancer Center Progress Note    Problem List:      Patient Active Problem List:     Dysmenorrhea     Chest pain, unspecified     Insomnia, unspecified     Acute pharyngitis     Lumbago     Headache(784.0)     Leukocytopenia     Umbilical hernia without ment positives and negatives were described. All other systems were negative.     PMH/PSH:  Past Medical History:   Diagnosis Date   • Anxiety    • Anxiety state, unspecified    • Breast CA ([facility] [address] 07/2015    DCIS   • Depression    • DJD (degenerative joint diseas tenderness. Lymphatics: There is no palpable lymphadenopathy throughout in the cervical, supraclavicular, or axillary regions. Psychiatric: The patient's mood is calm and appropriate for this visit.       Labs reviewed at this visit:     Lab Results   Com significant or suspicious finding.        Impression   CONCLUSION:     Postlumpectomy changes of the right breast.       No mammographic evidence of malignancy in either breast.           Assessment/Plan:     Ductal carcinoma of the right breast with negat

## 2021-06-28 NOTE — PROGRESS NOTES
Education Record    Learner:  Patient    Disease / Diagnosis: iron deficiency anemia/breast cancer    Barriers / Limitations:  None   Comments:    Method:  Discussion   Comments:    General Topics:  Plan of care reviewed   Comments:    Outcome:  Shows unde

## 2021-08-01 ENCOUNTER — APPOINTMENT (OUTPATIENT)
Dept: GENERAL RADIOLOGY | Age: 51
End: 2021-08-01
Attending: EMERGENCY MEDICINE
Payer: MEDICAID

## 2021-08-01 ENCOUNTER — HOSPITAL ENCOUNTER (OUTPATIENT)
Age: 51
Discharge: HOME | End: 2021-08-01
Attending: EMERGENCY MEDICINE
Payer: MEDICAID

## 2021-08-01 VITALS
WEIGHT: 120 LBS | BODY MASS INDEX: 20.49 KG/M2 | TEMPERATURE: 97 F | RESPIRATION RATE: 18 BRPM | HEART RATE: 88 BPM | DIASTOLIC BLOOD PRESSURE: 80 MMHG | OXYGEN SATURATION: 99 % | SYSTOLIC BLOOD PRESSURE: 119 MMHG | HEIGHT: 64 IN

## 2021-08-01 DIAGNOSIS — J06.9 VIRAL UPPER RESPIRATORY TRACT INFECTION: Primary | ICD-10-CM

## 2021-08-01 PROCEDURE — 99213 OFFICE O/P EST LOW 20 MIN: CPT

## 2021-08-01 PROCEDURE — 71046 X-RAY EXAM CHEST 2 VIEWS: CPT | Performed by: EMERGENCY MEDICINE

## 2021-08-01 NOTE — ED PROVIDER NOTES
Patient Seen in: [facility] [place]      History   Patient presents with:  Cough  Sore Throat    Stated Complaint: COUGH/CHEST PAIN/HEAD ACHE    HPI/Subjective:   HPI    45-year-old white female presents immediate care today for complaint of coug Pulse 88   Temp 97.4 °F (36.3 °C) (Temporal)   Resp 18   Ht 162.6 cm (5' 4\")   Wt 54.4 kg   LMP 07/20/2021   SpO2 99%   BMI 20.60 kg/m²         Physical Exam    Well-developed well-nourished female who is sitting on the gurney, she is awake and alert and Discharge Medication List

## 2021-08-01 NOTE — ED INITIAL ASSESSMENT (HPI)
Sore throat- started Saturday  Cough - started Monday. Pt has been taking cold and flu, allergy, delsym. Denies fever, pt is not vaccinated for covid.

## 2021-08-03 LAB — SARS-COV-2 RNA RESP QL NAA+PROBE: NOT DETECTED

## 2021-08-13 ENCOUNTER — HOSPITAL ENCOUNTER (OUTPATIENT)
Age: 51
Discharge: HOME | End: 2021-08-13
Attending: STUDENT IN AN ORGANIZED HEALTH CARE EDUCATION/TRAINING PROGRAM
Payer: MEDICAID

## 2021-08-13 ENCOUNTER — TELEPHONE (OUTPATIENT)
Dept: FAMILY MEDICINE CLINIC | Facility: CLINIC | Age: 51
End: 2021-08-13

## 2021-08-13 VITALS
BODY MASS INDEX: 20.49 KG/M2 | SYSTOLIC BLOOD PRESSURE: 123 MMHG | HEART RATE: 80 BPM | OXYGEN SATURATION: 99 % | RESPIRATION RATE: 18 BRPM | DIASTOLIC BLOOD PRESSURE: 93 MMHG | TEMPERATURE: 98 F | WEIGHT: 120 LBS | HEIGHT: 64 IN

## 2021-08-13 DIAGNOSIS — J18.9 COMMUNITY ACQUIRED PNEUMONIA, UNSPECIFIED LATERALITY: Primary | ICD-10-CM

## 2021-08-13 DIAGNOSIS — R05.9 COUGH: ICD-10-CM

## 2021-08-13 PROCEDURE — 99213 OFFICE O/P EST LOW 20 MIN: CPT

## 2021-08-13 RX ORDER — BENZONATATE 100 MG/1
100 CAPSULE ORAL 3 TIMES DAILY PRN
Qty: 30 CAPSULE | Refills: 0 | Status: SHIPPED | OUTPATIENT
Start: 2021-08-13 | End: 2021-08-13

## 2021-08-13 RX ORDER — DOXYCYCLINE HYCLATE 100 MG/1
100 CAPSULE ORAL 2 TIMES DAILY
Qty: 14 CAPSULE | Refills: 0 | Status: SHIPPED | OUTPATIENT
Start: 2021-08-13 | End: 2021-08-13

## 2021-08-13 RX ORDER — DOXYCYCLINE HYCLATE 100 MG/1
100 CAPSULE ORAL 2 TIMES DAILY
Qty: 14 CAPSULE | Refills: 0 | Status: SHIPPED | OUTPATIENT
Start: 2021-08-13 | End: 2021-08-20

## 2021-08-13 RX ORDER — CODEINE PHOSPHATE AND GUAIFENESIN 10; 100 MG/5ML; MG/5ML
5 SOLUTION ORAL EVERY 6 HOURS PRN
Qty: 180 ML | Refills: 0 | Status: SHIPPED | OUTPATIENT
Start: 2021-08-13 | End: 2021-08-13

## 2021-08-13 RX ORDER — CODEINE PHOSPHATE AND GUAIFENESIN 10; 100 MG/5ML; MG/5ML
5 SOLUTION ORAL EVERY 6 HOURS PRN
Qty: 180 ML | Refills: 0 | Status: SHIPPED | OUTPATIENT
Start: 2021-08-13 | End: 2021-11-02 | Stop reason: ALTCHOICE

## 2021-08-13 RX ORDER — BENZONATATE 100 MG/1
100 CAPSULE ORAL 3 TIMES DAILY PRN
Qty: 30 CAPSULE | Refills: 0 | Status: SHIPPED | OUTPATIENT
Start: 2021-08-13 | End: 2021-09-12

## 2021-08-13 NOTE — TELEPHONE ENCOUNTER
Pt went to  on 8/1. Her cough is getting worse and now has stuffy nose and headache.     Had a - COVID test.

## 2021-08-13 NOTE — TELEPHONE ENCOUNTER
Patient with mild recurrent hypokalemia, most likely due to consistent diuresis. K today 3.4.    Plan:   - CMP daily   - Replete PRN with goal K>4   - Potassium bicarbonate 50 mEq x 2 doses today   Worsening cough  Prod, yellow  Coughing fits, \"chest muscle hurts\"  Taking OTC cough meds  No fever   No SOB  +Chest tightness   Fatigue    Pt advised to go to UC to be evaluated  F/u if with persistent sx  With verbalized understanding

## 2021-08-14 NOTE — ED PROVIDER NOTES
Patient Seen in: [facility]      History   Patient presents with:  Cough/URI    Stated Complaint: COUGH X3 WEEKS/CONGESTION    HPI/Subjective:   HPI    Patient is a 53-year-old female presenting to the immediate care for evaluation of cou Temp 97.9 °F (36.6 °C)   Temp src Temporal   SpO2 99 %   O2 Device None (Room air)       Current:BP (!) 123/93   Pulse 80   Temp 97.9 °F (36.6 °C) (Temporal)   Resp 18   Ht 162.6 cm (5' 4\")   Wt 54.4 kg   LMP 08/09/2021   SpO2 99%   BMI 20.60 kg/m² she is comfortable with the plan and will follow up as directed.            Disposition and Plan     Clinical Impression:  Community acquired pneumonia, unspecified laterality  (primary encounter diagnosis)  Cough     Disposition:  Discharge  8/13/2021  8:1

## 2021-08-14 NOTE — ED INITIAL ASSESSMENT (HPI)
C/o cough with congestion x 3 weeks. 1 week ago c/o sinus congestion with yellow mucous. Denies fevers.

## 2021-09-01 ENCOUNTER — APPOINTMENT (OUTPATIENT)
Dept: GENERAL RADIOLOGY | Age: 51
End: 2021-09-01
Attending: NURSE PRACTITIONER
Payer: MEDICAID

## 2021-09-01 ENCOUNTER — HOSPITAL ENCOUNTER (OUTPATIENT)
Age: 51
Discharge: HOME | End: 2021-09-01
Payer: MEDICAID

## 2021-09-01 VITALS
SYSTOLIC BLOOD PRESSURE: 117 MMHG | TEMPERATURE: 98 F | DIASTOLIC BLOOD PRESSURE: 78 MMHG | HEIGHT: 64 IN | BODY MASS INDEX: 20.32 KG/M2 | RESPIRATION RATE: 17 BRPM | WEIGHT: 119 LBS | OXYGEN SATURATION: 95 % | HEART RATE: 83 BPM

## 2021-09-01 DIAGNOSIS — J40 BRONCHITIS: Primary | ICD-10-CM

## 2021-09-01 DIAGNOSIS — J30.2 SEASONAL ALLERGIES: ICD-10-CM

## 2021-09-01 PROCEDURE — 99213 OFFICE O/P EST LOW 20 MIN: CPT

## 2021-09-01 PROCEDURE — 99214 OFFICE O/P EST MOD 30 MIN: CPT

## 2021-09-01 PROCEDURE — 71046 X-RAY EXAM CHEST 2 VIEWS: CPT | Performed by: NURSE PRACTITIONER

## 2021-09-01 RX ORDER — ALBUTEROL SULFATE 90 UG/1
2 AEROSOL, METERED RESPIRATORY (INHALATION) EVERY 4 HOURS PRN
Qty: 1 EACH | Refills: 0 | Status: SHIPPED | OUTPATIENT
Start: 2021-09-01 | End: 2021-10-01

## 2021-09-01 RX ORDER — METHYLPREDNISOLONE 4 MG/1
TABLET ORAL
Qty: 1 EACH | Refills: 0 | Status: SHIPPED | OUTPATIENT
Start: 2021-09-01 | End: 2021-11-02 | Stop reason: ALTCHOICE

## 2021-09-01 RX ORDER — GUAIFENESIN AND CODEINE PHOSPHATE 100; 10 MG/5ML; MG/5ML
5 SOLUTION ORAL EVERY 8 HOURS PRN
Qty: 118 ML | Refills: 0 | Status: SHIPPED | OUTPATIENT
Start: 2021-09-01 | End: 2021-09-15

## 2021-09-01 RX ORDER — BENZONATATE 100 MG/1
100 CAPSULE ORAL 3 TIMES DAILY PRN
Qty: 30 CAPSULE | Refills: 0 | Status: SHIPPED | OUTPATIENT
Start: 2021-09-01 | End: 2021-10-01

## 2021-09-01 RX ORDER — MONTELUKAST SODIUM 10 MG/1
10 TABLET ORAL NIGHTLY
Qty: 30 TABLET | Refills: 0 | Status: SHIPPED | OUTPATIENT
Start: 2021-09-01 | End: 2021-10-01

## 2021-09-01 NOTE — ED INITIAL ASSESSMENT (HPI)
Aug 1- Diagnosed with with viral URI  August 13th- diagnosed with community acquired pneumonia. Rx of Doxycycline, Cheratussin AC and Benzonatate. Patient states one week ago diagnosed with pneumonia.     Patient states chest discomfort with coughing

## 2021-09-01 NOTE — ED PROVIDER NOTES
Patient Seen in: [facility] [place]      History   Patient presents with:  Cough    Stated Complaint: breathing issue / hx of pneumonia    HPI/Subjective:   HPI  Patient is 72-year-old female past medical history of anxiety depression, breast can History    Tobacco Use      Smoking status: Never Smoker      Smokeless tobacco: Never Used    Vaping Use      Vaping Use: Never used    Alcohol use: No      Alcohol/week: 0.0 standard drinks    Drug use:  No             Review of Systems    Positive for st gallop. Pulmonary:      Effort: Pulmonary effort is normal. No respiratory distress. Breath sounds: Normal breath sounds. No stridor. No wheezing, rhonchi or rales. Chest:      Chest wall: No tenderness.    Abdominal:      General: There is no dis start prednisone if her Covid test is negative. Singulair for presumed seasonal allergies.              Disposition and Plan     Clinical Impression:  Bronchitis  (primary encounter diagnosis)  Seasonal allergies     Disposition:  Discharge  9/1/2021  1:51

## 2021-09-02 LAB — SARS-COV-2 RNA RESP QL NAA+PROBE: NOT DETECTED

## 2021-09-16 ENCOUNTER — NURSE ONLY (OUTPATIENT)
Dept: HEMATOLOGY/ONCOLOGY | Facility: HOSPITAL | Age: 51
End: 2021-09-16
Attending: INTERNAL MEDICINE
Payer: MEDICAID

## 2021-09-16 DIAGNOSIS — D50.0 IRON DEFICIENCY ANEMIA DUE TO CHRONIC BLOOD LOSS: ICD-10-CM

## 2021-09-16 DIAGNOSIS — D05.11 DUCTAL CARCINOMA IN SITU (DCIS) OF RIGHT BREAST: ICD-10-CM

## 2021-09-16 LAB
BASOPHILS # BLD AUTO: 0.05 X10(3) UL (ref 0–0.2)
BASOPHILS NFR BLD AUTO: 1.1 %
DEPRECATED HBV CORE AB SER IA-ACNC: 50.9 NG/ML
EOSINOPHIL # BLD AUTO: 0.08 X10(3) UL (ref 0–0.7)
EOSINOPHIL NFR BLD AUTO: 1.7 %
ERYTHROCYTE [DISTWIDTH] IN BLOOD BY AUTOMATED COUNT: 11.7 %
HCT VFR BLD AUTO: 41.1 %
HGB BLD-MCNC: 14.2 G/DL
IMM GRANULOCYTES # BLD AUTO: 0 X10(3) UL (ref 0–1)
IMM GRANULOCYTES NFR BLD: 0 %
LYMPHOCYTES # BLD AUTO: 1.82 X10(3) UL (ref 1–4)
LYMPHOCYTES NFR BLD AUTO: 38.8 %
MCH RBC QN AUTO: 30.2 PG (ref 26–34)
MCHC RBC AUTO-ENTMCNC: 34.5 G/DL (ref 31–37)
MCV RBC AUTO: 87.4 FL
MONOCYTES # BLD AUTO: 0.47 X10(3) UL (ref 0.1–1)
MONOCYTES NFR BLD AUTO: 10 %
NEUTROPHILS # BLD AUTO: 2.27 X10 (3) UL (ref 1.5–7.7)
NEUTROPHILS # BLD AUTO: 2.27 X10(3) UL (ref 1.5–7.7)
NEUTROPHILS NFR BLD AUTO: 48.4 %
PLATELET # BLD AUTO: 276 10(3)UL (ref 150–450)
RBC # BLD AUTO: 4.7 X10(6)UL
WBC # BLD AUTO: 4.7 X10(3) UL (ref 4–11)

## 2021-09-16 PROCEDURE — 82728 ASSAY OF FERRITIN: CPT

## 2021-09-16 PROCEDURE — 36415 COLL VENOUS BLD VENIPUNCTURE: CPT

## 2021-09-16 PROCEDURE — 85025 COMPLETE CBC W/AUTO DIFF WBC: CPT

## 2021-09-20 ENCOUNTER — TELEPHONE (OUTPATIENT)
Dept: HEMATOLOGY/ONCOLOGY | Facility: HOSPITAL | Age: 51
End: 2021-09-20

## 2021-09-20 DIAGNOSIS — D50.0 IRON DEFICIENCY ANEMIA DUE TO CHRONIC BLOOD LOSS: Primary | ICD-10-CM

## 2021-09-20 NOTE — TELEPHONE ENCOUNTER
Lindwood Primrose, MD Lael Christmas, RN  Caller: Unspecified (Today, 11:16 AM)  Her infed is lower but still adequate. The cbc is stable.  I would recommend repeat cbc and ferritin in three months.             Spoke with patient per  - notified of abov

## 2021-09-21 RX ORDER — VENLAFAXINE HYDROCHLORIDE 37.5 MG/1
CAPSULE, EXTENDED RELEASE ORAL
Qty: 30 CAPSULE | Refills: 2 | Status: SHIPPED | OUTPATIENT
Start: 2021-09-21 | End: 2021-11-08 | Stop reason: DRUGHIGH

## 2021-09-21 NOTE — TELEPHONE ENCOUNTER
Last refill 6/17/2021  Quantity 30+2  LOV 6/5/2021,5/2021 for issues/ 4/2021 physical  Needs office visit for this med-can someone call and set up? Thank you.

## 2021-11-02 NOTE — PROGRESS NOTES
Carolina Bran is a 54year old femalecc. Anxiety, sleeping difficulty, chest wall pain  HPI:   Patient is coming to the office for follow-up of anxiety. She is taking venlafaxine 37.5 mg. Medication is helping. She is feeling more relaxed.  Occasionally ha Anxiety    • Anxiety state, unspecified    • Breast CA ([facility] [address] 07/2015    DCIS   • Depression    • DJD (degenerative joint disease), lumbar    • Headache(784.0)    • Hiatal hernia    • Insomnia, unspecified    • Intestinal infection due to Clostridium diffic Signed Prescriptions Disp Refills   • ALPRAZolam 0.5 MG Oral Tab 30 tablet 2     Sig: TAKE 1 TABLET BY MOUTH DAILY AS NEEDED.    • venlafaxine 37.5 MG Oral Capsule SR 24 Hr 60 capsule 3     Sig: Take 1 capsule (37.5 mg total) by mouth 2 (two) times a da

## 2021-11-02 NOTE — PATIENT INSTRUCTIONS
Increase venlafaxine to 75 mg daily. Use alprazolam 0.5 mg 1/2 to 1 tablet as needed for sleep or anxiety. Stay active. Keep good hydration. Use diclofenac as needed for chest wall pain. Monitor symptoms. Follow-up January 2022.

## 2021-11-04 RX ORDER — DICLOFENAC SODIUM 75 MG/1
TABLET, DELAYED RELEASE ORAL
Qty: 60 TABLET | Refills: 2 | Status: SHIPPED | OUTPATIENT
Start: 2021-11-04

## 2021-11-04 NOTE — TELEPHONE ENCOUNTER
DICLOFENAC SODIUM 75MG DR TABLETS    LOV: 11/02/2021 for Anxiety    UPCOMING APPT: N/A    LAST REFILL:  11/02/2021  QTY:  30 / 0 REFILLS    *qty was incorrect on recent refill*    RX pended, please review if applicable. Thank You!

## 2021-11-08 ENCOUNTER — TELEPHONE (OUTPATIENT)
Dept: FAMILY MEDICINE CLINIC | Facility: CLINIC | Age: 51
End: 2021-11-08

## 2021-11-08 RX ORDER — VENLAFAXINE HYDROCHLORIDE 75 MG/1
75 TABLET, EXTENDED RELEASE ORAL DAILY
Qty: 30 TABLET | Refills: 2 | Status: SHIPPED | OUTPATIENT
Start: 2021-11-08 | End: 2021-12-08

## 2021-11-08 RX ORDER — VENLAFAXINE HYDROCHLORIDE 75 MG/1
37.5 TABLET, EXTENDED RELEASE ORAL DAILY
Qty: 30 TABLET | Refills: 2 | Status: SHIPPED | OUTPATIENT
Start: 2021-11-08 | End: 2021-11-08

## 2021-11-08 NOTE — TELEPHONE ENCOUNTER
It needs to be extended release tablets and is only once a day. Medication is really helping her. She recently started to have some chest pain and worsening anxiety that is why we wanted to increase the dose. We can do PA.  we can do prior authorization.

## 2021-11-08 NOTE — TELEPHONE ENCOUNTER
Pt calling re   venlafaxine 37.5 MG Oral Capsule SR 24 Hr 60 capsule 3 11/2/2021    Sig:   Take 1 capsule (37.5 mg total) by mouth 2 (two) times a day.        [facility] [address], [address]  [place]any [place] AT [address]

## 2021-11-08 NOTE — TELEPHONE ENCOUNTER
Please advise:    Epic indicates PA is needed for   Venlafaxine 75mg ER tablets    Likely no PA is necessary for   Venlafaxine 75mg SR tablets      Can we place order for SR?

## 2021-11-08 NOTE — TELEPHONE ENCOUNTER
Noted. Rx sent to pharmacy-->  Venlafaxine HCl ER 75 MG Oral Tablet 24 Hr 30 tablet 2   Take 1 tablet (75 mg total) by mouth daily. S/w pt. Advised of above orders. Pt agreeable, no additional questions at this time.

## 2021-11-11 RX ORDER — VENLAFAXINE HYDROCHLORIDE 75 MG/1
75 CAPSULE, EXTENDED RELEASE ORAL DAILY
Qty: 30 CAPSULE | Refills: 2 | Status: SHIPPED | OUTPATIENT
Start: 2021-11-11 | End: 2022-01-13

## 2021-11-11 NOTE — PROGRESS NOTES
Noted. Call to pt. LVM advising order has been sent. Provided ofc hours and phone number for additional questions, if any.

## 2021-11-30 RX ORDER — VENLAFAXINE HYDROCHLORIDE 37.5 MG/1
CAPSULE, EXTENDED RELEASE ORAL
Qty: 30 CAPSULE | Refills: 2 | OUTPATIENT
Start: 2021-11-30

## 2022-01-12 ENCOUNTER — TELEPHONE (OUTPATIENT)
Dept: FAMILY MEDICINE CLINIC | Facility: CLINIC | Age: 52
End: 2022-01-12

## 2022-01-12 RX ORDER — ZOLPIDEM TARTRATE 5 MG/1
5 TABLET ORAL NIGHTLY PRN
Qty: 30 TABLET | Refills: 1 | Status: SHIPPED | OUTPATIENT
Start: 2022-01-12

## 2022-01-12 NOTE — TELEPHONE ENCOUNTER
Name from pharmacy: VENLAFAXINE ER 75MG CAPSULES          Will file in chart as: VENLAFAXINE 75 MG Oral Capsule SR 24 Hr    Sig: TAKE 1 CAPSULE(75 MG) BY MOUTH DAILY    Disp:  30 capsule    Refills:  2 (Pharmacy requested: Not specified)    Start: 1/12/202

## 2022-01-12 NOTE — TELEPHONE ENCOUNTER
Pt requesting refill of:    Zolpidem Tartrate 5 MG Oral Tab     Be sent to:    [id number] [address] [facility], [phone], [phone]

## 2022-01-12 NOTE — TELEPHONE ENCOUNTER
Last med visit for anxiety 11/2/2021-using xanax for sleep. Advised follow up January 2022. No appts since. Has appt sched for March.   Zolpidem 5 mg po prn at HS last ordered 02/19/19    Call to pt-discussed above info and refill request. Pt sts has been u

## 2022-01-13 RX ORDER — VENLAFAXINE HYDROCHLORIDE 75 MG/1
CAPSULE, EXTENDED RELEASE ORAL
Qty: 30 CAPSULE | Refills: 2 | Status: SHIPPED | OUTPATIENT
Start: 2022-01-13

## 2022-01-13 NOTE — TELEPHONE ENCOUNTER
I have called  zolpidem 5 mg Rx to patient's pharmacy. She can use it at bedtime. Do not use Xanax and zolpidem at the same time. Try not to use it every day. Keep an appointment in March as scheduled.   Thank you

## 2022-03-15 PROBLEM — N92.1 METRORRHAGIA: Status: ACTIVE | Noted: 2022-03-15

## 2022-03-15 PROBLEM — N76.0 ACUTE VAGINITIS: Status: ACTIVE | Noted: 2022-03-15

## 2022-03-15 PROBLEM — R10.32 LEFT LOWER QUADRANT PAIN: Status: ACTIVE | Noted: 2022-03-15

## 2022-03-15 PROBLEM — R97.1 ELEVATED CANCER ANTIGEN 125 (CA-125): Status: ACTIVE | Noted: 2022-03-15

## 2022-03-15 PROBLEM — N81.2 UTEROVAGINAL PROLAPSE, INCOMPLETE: Status: ACTIVE | Noted: 2022-03-15

## 2022-03-15 PROBLEM — N94.5 SECONDARY DYSMENORRHEA: Status: ACTIVE | Noted: 2022-03-15

## 2022-03-15 PROBLEM — D25.1 INTRAMURAL LEIOMYOMA OF UTERUS: Status: ACTIVE | Noted: 2022-03-15

## 2022-03-15 PROBLEM — N83.202 LEFT OVARIAN CYST: Status: ACTIVE | Noted: 2022-03-15

## 2022-03-16 ENCOUNTER — TELEPHONE (OUTPATIENT)
Dept: FAMILY MEDICINE CLINIC | Facility: CLINIC | Age: 52
End: 2022-03-16

## 2022-03-16 NOTE — PATIENT INSTRUCTIONS
Continue venlafaxine 37.5 mg daily. Use alprazolam as needed. He can try taking zolpidem half of the tablet at bedtime as needed. Stay active. Keep good hydration. Forward results from your OB/GYN.   Schedule complete physical.

## 2022-03-16 NOTE — TELEPHONE ENCOUNTER
Call from frank/[facility]e central scheduling-sts pt calling to schedule mammogram-sts saw dr Yusuf Cosme yesterday but no order in record. OV notes advise schedule cpx-no appt scheduled. Advised will forward to dr Yusuf Cosme to confirm appropriate MRI order for this pt. Agusto Oleary voices understanding. Requests call back w  input. **Pt w hx of right breast cancer, s/p lumpectomy and hx of breast augmentation. See radiology recommendations in last diagnostic bilateral augmented mamm and ultrasound  3/29/21 and advise. Please route back to triage for call back to central scheduling/[phone] option#1-to reach back out to pt to schedule-thanks!

## 2022-03-21 ENCOUNTER — MED REC SCAN ONLY (OUTPATIENT)
Dept: FAMILY MEDICINE CLINIC | Facility: CLINIC | Age: 52
End: 2022-03-21

## 2022-03-24 ENCOUNTER — NURSE ONLY (OUTPATIENT)
Dept: HEMATOLOGY/ONCOLOGY | Facility: HOSPITAL | Age: 52
End: 2022-03-24
Attending: FAMILY MEDICINE
Payer: MEDICAID

## 2022-03-24 ENCOUNTER — HOSPITAL ENCOUNTER (OUTPATIENT)
Dept: MAMMOGRAPHY | Facility: HOSPITAL | Age: 52
Discharge: HOME | End: 2022-03-24
Attending: FAMILY MEDICINE
Payer: MEDICAID

## 2022-03-24 DIAGNOSIS — Z98.82 HISTORY OF BREAST AUGMENTATION: ICD-10-CM

## 2022-03-24 DIAGNOSIS — D50.0 IRON DEFICIENCY ANEMIA DUE TO CHRONIC BLOOD LOSS: ICD-10-CM

## 2022-03-24 DIAGNOSIS — Z12.31 ENCOUNTER FOR SCREENING MAMMOGRAM FOR BREAST CANCER: ICD-10-CM

## 2022-03-24 DIAGNOSIS — Z85.3 HISTORY OF RIGHT BREAST CANCER: ICD-10-CM

## 2022-03-24 LAB
BASOPHILS # BLD AUTO: 0.04 X10(3) UL (ref 0–0.2)
BASOPHILS NFR BLD AUTO: 1 %
DEPRECATED HBV CORE AB SER IA-ACNC: 23 NG/ML
EOSINOPHIL # BLD AUTO: 0.09 X10(3) UL (ref 0–0.7)
EOSINOPHIL NFR BLD AUTO: 2.2 %
ERYTHROCYTE [DISTWIDTH] IN BLOOD BY AUTOMATED COUNT: 12.4 %
HCT VFR BLD AUTO: 42.8 %
HGB BLD-MCNC: 14.1 G/DL
IMM GRANULOCYTES # BLD AUTO: 0 X10(3) UL (ref 0–1)
IMM GRANULOCYTES NFR BLD: 0 %
LYMPHOCYTES # BLD AUTO: 2.12 X10(3) UL (ref 1–4)
LYMPHOCYTES NFR BLD AUTO: 52.1 %
MCH RBC QN AUTO: 29.4 PG (ref 26–34)
MCHC RBC AUTO-ENTMCNC: 32.9 G/DL (ref 31–37)
MCV RBC AUTO: 89.4 FL
MONOCYTES # BLD AUTO: 0.42 X10(3) UL (ref 0.1–1)
NEUTROPHILS # BLD AUTO: 1.4 X10 (3) UL (ref 1.5–7.7)
NEUTROPHILS # BLD AUTO: 1.4 X10(3) UL (ref 1.5–7.7)
NEUTROPHILS NFR BLD AUTO: 34.4 %
PLATELET # BLD AUTO: 320 10(3)UL (ref 150–450)
RBC # BLD AUTO: 4.79 X10(6)UL
WBC # BLD AUTO: 4.1 X10(3) UL (ref 4–11)

## 2022-03-24 PROCEDURE — 36415 COLL VENOUS BLD VENIPUNCTURE: CPT

## 2022-03-24 PROCEDURE — 77063 BREAST TOMOSYNTHESIS BI: CPT | Performed by: FAMILY MEDICINE

## 2022-03-24 PROCEDURE — 77067 SCR MAMMO BI INCL CAD: CPT | Performed by: FAMILY MEDICINE

## 2022-03-24 PROCEDURE — 85025 COMPLETE CBC W/AUTO DIFF WBC: CPT

## 2022-03-24 PROCEDURE — 82728 ASSAY OF FERRITIN: CPT

## 2022-05-16 ENCOUNTER — TELEPHONE (OUTPATIENT)
Dept: FAMILY MEDICINE CLINIC | Facility: CLINIC | Age: 52
End: 2022-05-16

## 2022-05-16 NOTE — TELEPHONE ENCOUNTER
Pt calling requesting appointment for annual physical with Dr Ferrell Bolds states Dr Mervin Dancer wanted to see her this month. Advised scheduling out until August. Pt states needs to be seen this month per Dr Mervin Dancer. Please advise if there is day/time you would like to see pt this month?

## 2022-06-15 ENCOUNTER — OFFICE VISIT (OUTPATIENT)
Dept: FAMILY MEDICINE CLINIC | Facility: CLINIC | Age: 52
End: 2022-06-15
Payer: MEDICAID

## 2022-06-15 VITALS
BODY MASS INDEX: 20.32 KG/M2 | TEMPERATURE: 99 F | SYSTOLIC BLOOD PRESSURE: 108 MMHG | WEIGHT: 119 LBS | HEIGHT: 64 IN | HEART RATE: 84 BPM | RESPIRATION RATE: 20 BRPM | DIASTOLIC BLOOD PRESSURE: 74 MMHG

## 2022-06-15 DIAGNOSIS — Z13.89 SCREENING FOR GENITOURINARY CONDITION: ICD-10-CM

## 2022-06-15 DIAGNOSIS — Z86.000 HISTORY OF DUCTAL CARCINOMA IN SITU (DCIS) OF BREAST: ICD-10-CM

## 2022-06-15 DIAGNOSIS — Z00.00 PHYSICAL EXAM, ANNUAL: Primary | ICD-10-CM

## 2022-06-15 DIAGNOSIS — Z12.11 SCREENING FOR COLON CANCER: ICD-10-CM

## 2022-06-15 DIAGNOSIS — Z00.00 LABORATORY EXAMINATION ORDERED AS PART OF A ROUTINE GENERAL MEDICAL EXAMINATION: ICD-10-CM

## 2022-06-15 DIAGNOSIS — R25.2 LEG CRAMPS: ICD-10-CM

## 2022-06-15 PROCEDURE — 3008F BODY MASS INDEX DOCD: CPT | Performed by: FAMILY MEDICINE

## 2022-06-15 PROCEDURE — 3074F SYST BP LT 130 MM HG: CPT | Performed by: FAMILY MEDICINE

## 2022-06-15 PROCEDURE — 3078F DIAST BP <80 MM HG: CPT | Performed by: FAMILY MEDICINE

## 2022-06-15 PROCEDURE — 99396 PREV VISIT EST AGE 40-64: CPT | Performed by: FAMILY MEDICINE

## 2022-06-15 RX ORDER — VENLAFAXINE HYDROCHLORIDE 75 MG/1
CAPSULE, EXTENDED RELEASE ORAL
COMMUNITY
Start: 2022-03-15

## 2022-06-15 NOTE — PATIENT INSTRUCTIONS
Shingles shot- Shingrix. Healthy diet. Stay active. Call Dr. Claudette Nicole for evaluation. Do fasting blood work. Schedule medication check September or October 2022.

## 2022-06-16 ENCOUNTER — TELEPHONE (OUTPATIENT)
Dept: FAMILY MEDICINE CLINIC | Facility: CLINIC | Age: 52
End: 2022-06-16

## 2022-06-17 NOTE — TELEPHONE ENCOUNTER
I have written patient a letter  as she requested please let her know. She can print it from my chart.   Thank you

## 2022-06-20 ENCOUNTER — LAB ENCOUNTER (OUTPATIENT)
Dept: LAB | Facility: HOSPITAL | Age: 52
End: 2022-06-20
Attending: FAMILY MEDICINE
Payer: MEDICAID

## 2022-06-20 DIAGNOSIS — Z00.00 ROUTINE GENERAL MEDICAL EXAMINATION AT A HEALTH CARE FACILITY: Primary | ICD-10-CM

## 2022-06-20 LAB
ALBUMIN SERPL-MCNC: 4.1 G/DL (ref 3.4–5)
ALBUMIN/GLOB SERPL: 1.2 {RATIO} (ref 1–2)
ALP LIVER SERPL-CCNC: 50 U/L
ALT SERPL-CCNC: 17 U/L
ANION GAP SERPL CALC-SCNC: 6 MMOL/L (ref 0–18)
AST SERPL-CCNC: 15 U/L (ref 15–37)
BASOPHILS # BLD AUTO: 0.06 X10(3) UL (ref 0–0.2)
BASOPHILS NFR BLD AUTO: 2 %
BILIRUB SERPL-MCNC: 0.5 MG/DL (ref 0.1–2)
BILIRUB UR QL STRIP.AUTO: NEGATIVE
BUN BLD-MCNC: 9 MG/DL (ref 7–18)
CALCIUM BLD-MCNC: 9.1 MG/DL (ref 8.5–10.1)
CHLORIDE SERPL-SCNC: 107 MMOL/L (ref 98–112)
CHOLEST SERPL-MCNC: 200 MG/DL (ref ?–200)
CLARITY UR REFRACT.AUTO: CLEAR
CO2 SERPL-SCNC: 25 MMOL/L (ref 21–32)
COLOR UR AUTO: YELLOW
CREAT BLD-MCNC: 0.68 MG/DL
EOSINOPHIL # BLD AUTO: 0.1 X10(3) UL (ref 0–0.7)
EOSINOPHIL NFR BLD AUTO: 3.3 %
ERYTHROCYTE [DISTWIDTH] IN BLOOD BY AUTOMATED COUNT: 12.6 %
FASTING PATIENT LIPID ANSWER: YES
FASTING STATUS PATIENT QL REPORTED: YES
GLOBULIN PLAS-MCNC: 3.4 G/DL (ref 2.8–4.4)
GLUCOSE BLD-MCNC: 100 MG/DL (ref 70–99)
GLUCOSE UR STRIP.AUTO-MCNC: NEGATIVE MG/DL
HCT VFR BLD AUTO: 46.3 %
HDLC SERPL-MCNC: 96 MG/DL (ref 40–59)
HGB BLD-MCNC: 15 G/DL
IMM GRANULOCYTES # BLD AUTO: 0 X10(3) UL (ref 0–1)
IMM GRANULOCYTES NFR BLD: 0 %
KETONES UR STRIP.AUTO-MCNC: NEGATIVE MG/DL
LDLC SERPL CALC-MCNC: 93 MG/DL (ref ?–100)
LEUKOCYTE ESTERASE UR QL STRIP.AUTO: NEGATIVE
LYMPHOCYTES # BLD AUTO: 1.59 X10(3) UL (ref 1–4)
LYMPHOCYTES NFR BLD AUTO: 53 %
MCH RBC QN AUTO: 29.5 PG (ref 26–34)
MCHC RBC AUTO-ENTMCNC: 32.4 G/DL (ref 31–37)
MCV RBC AUTO: 91.1 FL
MONOCYTES # BLD AUTO: 0.37 X10(3) UL (ref 0.1–1)
MONOCYTES NFR BLD AUTO: 12.3 %
NEUTROPHILS # BLD AUTO: 0.88 X10 (3) UL (ref 1.5–7.7)
NEUTROPHILS # BLD AUTO: 0.88 X10(3) UL (ref 1.5–7.7)
NEUTROPHILS NFR BLD AUTO: 29.4 %
NITRITE UR QL STRIP.AUTO: NEGATIVE
NONHDLC SERPL-MCNC: 104 MG/DL (ref ?–130)
OSMOLALITY SERPL CALC.SUM OF ELEC: 285 MOSM/KG (ref 275–295)
PH UR STRIP.AUTO: 8.5 [PH] (ref 5–8)
PLATELET # BLD AUTO: 277 10(3)UL (ref 150–450)
POTASSIUM SERPL-SCNC: 4.2 MMOL/L (ref 3.5–5.1)
PROT SERPL-MCNC: 7.5 G/DL (ref 6.4–8.2)
PROT UR STRIP.AUTO-MCNC: NEGATIVE MG/DL
RBC # BLD AUTO: 5.08 X10(6)UL
RBC UR QL AUTO: NEGATIVE
SODIUM SERPL-SCNC: 138 MMOL/L (ref 136–145)
SP GR UR STRIP.AUTO: 1.01 (ref 1–1.03)
TRIGL SERPL-MCNC: 59 MG/DL (ref 30–149)
TSI SER-ACNC: 0.69 MIU/ML (ref 0.36–3.74)
UROBILINOGEN UR STRIP.AUTO-MCNC: 0.2 MG/DL
VIT B12 SERPL-MCNC: 338 PG/ML (ref 193–986)
VIT D+METAB SERPL-MCNC: 56.2 NG/ML (ref 30–100)
VLDLC SERPL CALC-MCNC: 10 MG/DL (ref 0–30)
WBC # BLD AUTO: 3 X10(3) UL (ref 4–11)

## 2022-06-20 PROCEDURE — 82306 VITAMIN D 25 HYDROXY: CPT | Performed by: FAMILY MEDICINE

## 2022-06-20 PROCEDURE — 85025 COMPLETE CBC W/AUTO DIFF WBC: CPT | Performed by: FAMILY MEDICINE

## 2022-06-20 PROCEDURE — 80061 LIPID PANEL: CPT

## 2022-06-20 PROCEDURE — 36415 COLL VENOUS BLD VENIPUNCTURE: CPT

## 2022-06-20 PROCEDURE — 80053 COMPREHEN METABOLIC PANEL: CPT | Performed by: FAMILY MEDICINE

## 2022-06-20 PROCEDURE — 81003 URINALYSIS AUTO W/O SCOPE: CPT | Performed by: FAMILY MEDICINE

## 2022-06-20 PROCEDURE — 84443 ASSAY THYROID STIM HORMONE: CPT | Performed by: FAMILY MEDICINE

## 2022-06-20 PROCEDURE — 82607 VITAMIN B-12: CPT | Performed by: FAMILY MEDICINE

## 2022-07-22 ENCOUNTER — MED REC SCAN ONLY (OUTPATIENT)
Dept: FAMILY MEDICINE CLINIC | Facility: CLINIC | Age: 52
End: 2022-07-22

## 2022-08-08 ENCOUNTER — APPOINTMENT (OUTPATIENT)
Dept: HEMATOLOGY/ONCOLOGY | Facility: HOSPITAL | Age: 52
End: 2022-08-08
Attending: INTERNAL MEDICINE
Payer: MEDICAID

## 2022-08-10 ENCOUNTER — TELEPHONE (OUTPATIENT)
Dept: FAMILY MEDICINE CLINIC | Facility: CLINIC | Age: 52
End: 2022-08-10

## 2022-08-10 DIAGNOSIS — F41.9 ANXIETY: ICD-10-CM

## 2022-08-10 RX ORDER — ALPRAZOLAM 0.5 MG/1
TABLET ORAL
Qty: 30 TABLET | Refills: 1 | Status: SHIPPED | OUTPATIENT
Start: 2022-08-10

## 2022-08-11 NOTE — TELEPHONE ENCOUNTER
Medical Records Request received from [place], [place]. for complete medical record. Release original sent to [employer] on 8/11/2022. Release original faxed to [employer] at [phone] on 8/11/2022; marked Urgent. Fax confirmation received 8:18 a.m. Copy sent to scanning.

## 2022-08-15 ENCOUNTER — APPOINTMENT (OUTPATIENT)
Dept: HEMATOLOGY/ONCOLOGY | Facility: HOSPITAL | Age: 52
End: 2022-08-15
Attending: INTERNAL MEDICINE
Payer: MEDICAID

## 2022-08-23 RX ORDER — ZOLPIDEM TARTRATE 5 MG/1
TABLET ORAL
Qty: 30 TABLET | Refills: 0 | Status: SHIPPED | OUTPATIENT
Start: 2022-08-23

## 2022-08-29 ENCOUNTER — OFFICE VISIT (OUTPATIENT)
Facility: LOCATION | Age: 52
End: 2022-08-29
Payer: MEDICAID

## 2022-08-29 VITALS — TEMPERATURE: 98 F | HEART RATE: 95 BPM

## 2022-08-29 DIAGNOSIS — Z86.000 HISTORY OF DUCTAL CARCINOMA IN SITU (DCIS) OF BREAST: Primary | ICD-10-CM

## 2022-08-29 DIAGNOSIS — R92.2 DENSE BREAST TISSUE ON MAMMOGRAM: ICD-10-CM

## 2022-08-31 ENCOUNTER — OFFICE VISIT (OUTPATIENT)
Dept: HEMATOLOGY/ONCOLOGY | Facility: HOSPITAL | Age: 52
End: 2022-08-31
Attending: INTERNAL MEDICINE
Payer: MEDICAID

## 2022-08-31 VITALS
SYSTOLIC BLOOD PRESSURE: 114 MMHG | HEART RATE: 80 BPM | WEIGHT: 120.81 LBS | RESPIRATION RATE: 18 BRPM | OXYGEN SATURATION: 98 % | BODY MASS INDEX: 21 KG/M2 | DIASTOLIC BLOOD PRESSURE: 77 MMHG | TEMPERATURE: 98 F

## 2022-08-31 DIAGNOSIS — D05.11 DUCTAL CARCINOMA IN SITU (DCIS) OF RIGHT BREAST: ICD-10-CM

## 2022-08-31 DIAGNOSIS — D50.0 IRON DEFICIENCY ANEMIA DUE TO CHRONIC BLOOD LOSS: Primary | ICD-10-CM

## 2022-08-31 PROCEDURE — 99213 OFFICE O/P EST LOW 20 MIN: CPT | Performed by: INTERNAL MEDICINE

## 2022-08-31 RX ORDER — FOLIC ACID 1 MG/1
1 TABLET ORAL DAILY
Qty: 90 TABLET | Refills: 3 | Status: SHIPPED | OUTPATIENT
Start: 2022-08-31

## 2022-08-31 NOTE — PROGRESS NOTES
Patient is here for follow up for DCIS and iron deficiency. She has been feeling very tired recently. Her primary doctor did some blood work recently (June 2022) and recommended that she return for follow up visit. She is limited in her activity because of the fatigue. She thinks that she is eating well. She denies any fever, cough or shortness of breath. She still has regular periods, every 3 weeks that are sometimes heavy.       Education Record    Learner:  Patient    Disease / Diagnosis: iron deficiency anemia/ DCIS    Barriers / Limitations:  None   Comments:    Method:  Discussion   Comments:    General Topics:  Side effects and symptom management   Comments:    Outcome:  Shows understanding   Comments:

## 2022-09-22 ENCOUNTER — HOSPITAL ENCOUNTER (OUTPATIENT)
Dept: MRI IMAGING | Facility: HOSPITAL | Age: 52
Discharge: HOME | End: 2022-09-22
Attending: SURGERY

## 2022-09-22 DIAGNOSIS — Z86.000 HISTORY OF DUCTAL CARCINOMA IN SITU (DCIS) OF BREAST: ICD-10-CM

## 2022-09-22 DIAGNOSIS — R92.2 DENSE BREAST TISSUE ON MAMMOGRAM: ICD-10-CM

## 2022-09-22 PROCEDURE — A9575 INJ GADOTERATE MEGLUMI 0.1ML: HCPCS | Performed by: SURGERY

## 2022-09-22 PROCEDURE — 77049 MRI BREAST C-+ W/CAD BI: CPT | Performed by: SURGERY

## 2022-09-27 NOTE — PATIENT INSTRUCTIONS
Increase venlafaxine to 2  capsule a day-  total 75 mg a day   Continue using zolpidem at nighttime. Use alprazolam as needed. Healthy diet. Stay active. Follow-up in 2 months in the office.

## 2022-10-03 ENCOUNTER — TELEPHONE (OUTPATIENT)
Dept: FAMILY MEDICINE CLINIC | Facility: CLINIC | Age: 52
End: 2022-10-03

## 2022-10-03 RX ORDER — VENLAFAXINE HYDROCHLORIDE 37.5 MG/1
75 CAPSULE, EXTENDED RELEASE ORAL DAILY
Qty: 60 CAPSULE | Refills: 2 | Status: SHIPPED | OUTPATIENT
Start: 2022-10-03

## 2022-10-03 NOTE — TELEPHONE ENCOUNTER
Call from tiff/pharmacist/kari-requesting clarification of 9/27/22 venlafaxine order-sig sts take 10 capsules daily, dispense quantity #60. Advised will clarify w dr Herrera Due and call back. **please confirm-9/27/22 OV notes have same sig of 10 capsules daily on medication list but note to increase venlafaxine to 2 capsules/day-total 75 mg/day-thanks!

## 2022-10-03 NOTE — TELEPHONE ENCOUNTER
Correction it should be 2 capsules a day venlafaxine 37.5 mg-  total dose of 75 mg daily. I am sorry for the confusion, it was computer mistake. Thanks.

## 2022-10-26 ENCOUNTER — HOSPITAL ENCOUNTER (OUTPATIENT)
Dept: MAMMOGRAPHY | Facility: HOSPITAL | Age: 52
Discharge: HOME | End: 2022-10-26
Attending: SURGERY
Payer: MEDICAID

## 2022-10-26 DIAGNOSIS — D05.11 DUCTAL CARCINOMA IN SITU (DCIS) OF RIGHT BREAST: Primary | ICD-10-CM

## 2022-10-26 DIAGNOSIS — R92.2 INCONCLUSIVE MAMMOGRAM: ICD-10-CM

## 2022-10-26 PROCEDURE — 76642 ULTRASOUND BREAST LIMITED: CPT | Performed by: SURGERY

## 2022-11-04 RX ORDER — VENLAFAXINE HYDROCHLORIDE 37.5 MG/1
37.5 CAPSULE, EXTENDED RELEASE ORAL DAILY
Qty: 90 CAPSULE | Refills: 0 | Status: SHIPPED | OUTPATIENT
Start: 2022-11-04

## 2022-11-04 NOTE — TELEPHONE ENCOUNTER
Live call from pt  khurram her insurance will not cover 2 pills of venlafaxine ER 37.5  This was increased from one cap  She said she has not started to increase d/t insurance not covering. sts she would like to just take one a day, feels she is doing OK at that dose    Asking for new rx to reflect this if you agree? I did advise this rx comes in a 75mg that could equate this that we can send but she declines. sts she would like to just take one a day of the 37.5mg    I pended if you agree?

## 2022-12-27 ENCOUNTER — LAB ENCOUNTER (OUTPATIENT)
Dept: LAB | Age: 52
End: 2022-12-27
Attending: FAMILY MEDICINE
Payer: MEDICAID

## 2022-12-27 PROCEDURE — 82607 VITAMIN B-12: CPT | Performed by: FAMILY MEDICINE

## 2022-12-27 PROCEDURE — 85025 COMPLETE CBC W/AUTO DIFF WBC: CPT | Performed by: FAMILY MEDICINE

## 2022-12-27 PROCEDURE — 80053 COMPREHEN METABOLIC PANEL: CPT | Performed by: FAMILY MEDICINE

## 2022-12-27 PROCEDURE — 84443 ASSAY THYROID STIM HORMONE: CPT | Performed by: FAMILY MEDICINE

## 2022-12-27 PROCEDURE — 82306 VITAMIN D 25 HYDROXY: CPT | Performed by: FAMILY MEDICINE

## 2023-01-04 ENCOUNTER — TELEPHONE (OUTPATIENT)
Dept: FAMILY MEDICINE CLINIC | Facility: CLINIC | Age: 53
End: 2023-01-04

## 2023-01-04 NOTE — TELEPHONE ENCOUNTER
Pt called for the following cream stating that she has not received it or it has not been sent to pharmacy.     metroNIDAZOLE 1 % External Cream    Please advise

## 2023-01-04 NOTE — TELEPHONE ENCOUNTER
S/w Sofia Benefit Coordinator at [employer] stated the quantity listed ws 673 instead of (1) 60 gram tube. She corrected in their system to reflect Dr. Andrez Valentine order and will process this Rx. She stated if the pt would prefer to  this script at local pharmacy to call [facility] to get a listing of pharmacies under her plan.

## 2023-01-04 NOTE — TELEPHONE ENCOUNTER
S/w Ignate and notified her prescription will be filled via [facility] but if would prefer a local pharmacy to call [facility] for a listing of pharmacies. Pt voiced understanding.

## 2023-02-21 ENCOUNTER — TELEPHONE (OUTPATIENT)
Dept: FAMILY MEDICINE CLINIC | Facility: CLINIC | Age: 53
End: 2023-02-21

## 2023-02-21 ENCOUNTER — HOSPITAL ENCOUNTER (OUTPATIENT)
Age: 53
Discharge: ANOTHER HOSPITAL | End: 2023-02-21
Attending: EMERGENCY MEDICINE
Payer: MEDICAID

## 2023-02-21 ENCOUNTER — APPOINTMENT (OUTPATIENT)
Dept: CT IMAGING | Facility: HOSPITAL | Age: 53
End: 2023-02-21
Payer: MEDICAID

## 2023-02-21 ENCOUNTER — HOSPITAL ENCOUNTER (EMERGENCY)
Facility: HOSPITAL | Age: 53
Discharge: HOME | End: 2023-02-21
Attending: EMERGENCY MEDICINE
Payer: MEDICAID

## 2023-02-21 VITALS
RESPIRATION RATE: 18 BRPM | SYSTOLIC BLOOD PRESSURE: 114 MMHG | WEIGHT: 118 LBS | BODY MASS INDEX: 20.14 KG/M2 | DIASTOLIC BLOOD PRESSURE: 85 MMHG | HEIGHT: 64 IN | HEART RATE: 76 BPM | TEMPERATURE: 98 F | OXYGEN SATURATION: 99 %

## 2023-02-21 VITALS
DIASTOLIC BLOOD PRESSURE: 87 MMHG | RESPIRATION RATE: 18 BRPM | HEIGHT: 64 IN | TEMPERATURE: 98 F | OXYGEN SATURATION: 100 % | WEIGHT: 118 LBS | SYSTOLIC BLOOD PRESSURE: 126 MMHG | BODY MASS INDEX: 20.14 KG/M2 | HEART RATE: 85 BPM

## 2023-02-21 DIAGNOSIS — R51.9 HEADACHE DISORDER: Primary | ICD-10-CM

## 2023-02-21 DIAGNOSIS — R51.9 NONINTRACTABLE EPISODIC HEADACHE, UNSPECIFIED HEADACHE TYPE: Primary | ICD-10-CM

## 2023-02-21 LAB
ALBUMIN SERPL-MCNC: 4.3 G/DL (ref 3.4–5)
ALBUMIN/GLOB SERPL: 1.1 {RATIO} (ref 1–2)
ALP LIVER SERPL-CCNC: 37 U/L
ALT SERPL-CCNC: 25 U/L
ANION GAP SERPL CALC-SCNC: 2 MMOL/L (ref 0–18)
AST SERPL-CCNC: 45 U/L (ref 15–37)
B-HCG UR QL: NEGATIVE
BASOPHILS # BLD AUTO: 0.05 X10(3) UL (ref 0–0.2)
BASOPHILS NFR BLD AUTO: 1.5 %
BILIRUB SERPL-MCNC: 0.6 MG/DL (ref 0.1–2)
BILIRUB UR QL STRIP.AUTO: NEGATIVE
BUN BLD-MCNC: 9 MG/DL (ref 7–18)
CALCIUM BLD-MCNC: 9.5 MG/DL (ref 8.5–10.1)
CHLORIDE SERPL-SCNC: 108 MMOL/L (ref 98–112)
CO2 SERPL-SCNC: 26 MMOL/L (ref 21–32)
COLOR UR AUTO: YELLOW
CREAT BLD-MCNC: 0.7 MG/DL
EOSINOPHIL # BLD AUTO: 0.08 X10(3) UL (ref 0–0.7)
EOSINOPHIL NFR BLD AUTO: 2.4 %
ERYTHROCYTE [DISTWIDTH] IN BLOOD BY AUTOMATED COUNT: 11.9 %
GFR SERPLBLD BASED ON 1.73 SQ M-ARVRAT: 105 ML/MIN/1.73M2 (ref 60–?)
GLOBULIN PLAS-MCNC: 3.8 G/DL (ref 2.8–4.4)
GLUCOSE BLD-MCNC: 84 MG/DL (ref 70–99)
GLUCOSE UR STRIP.AUTO-MCNC: NEGATIVE MG/DL
HCT VFR BLD AUTO: 46.4 %
HGB BLD-MCNC: 15.5 G/DL
IMM GRANULOCYTES # BLD AUTO: 0.01 X10(3) UL (ref 0–1)
IMM GRANULOCYTES NFR BLD: 0.3 %
KETONES UR STRIP.AUTO-MCNC: NEGATIVE MG/DL
LYMPHOCYTES # BLD AUTO: 1.86 X10(3) UL (ref 1–4)
LYMPHOCYTES NFR BLD AUTO: 54.9 %
MCH RBC QN AUTO: 29.8 PG (ref 26–34)
MCHC RBC AUTO-ENTMCNC: 33.4 G/DL (ref 31–37)
MCV RBC AUTO: 89.1 FL
MONOCYTES # BLD AUTO: 0.43 X10(3) UL (ref 0.1–1)
MONOCYTES NFR BLD AUTO: 12.7 %
NEUTROPHILS # BLD AUTO: 0.96 X10 (3) UL (ref 1.5–7.7)
NEUTROPHILS # BLD AUTO: 0.96 X10(3) UL (ref 1.5–7.7)
NEUTROPHILS NFR BLD AUTO: 28.2 %
NITRITE UR QL STRIP.AUTO: NEGATIVE
OSMOLALITY SERPL CALC.SUM OF ELEC: 280 MOSM/KG (ref 275–295)
PH UR STRIP.AUTO: 8 [PH] (ref 5–8)
PLATELET # BLD AUTO: 285 10(3)UL (ref 150–450)
POTASSIUM SERPL-SCNC: 4.6 MMOL/L (ref 3.5–5.1)
PROT SERPL-MCNC: 8.1 G/DL (ref 6.4–8.2)
PROT UR STRIP.AUTO-MCNC: NEGATIVE MG/DL
RBC # BLD AUTO: 5.21 X10(6)UL
RBC UR QL AUTO: NEGATIVE
SODIUM SERPL-SCNC: 136 MMOL/L (ref 136–145)
SP GR UR STRIP.AUTO: 1.01 (ref 1–1.03)
UROBILINOGEN UR STRIP.AUTO-MCNC: <2 MG/DL
WBC # BLD AUTO: 3.4 X10(3) UL (ref 4–11)

## 2023-02-21 PROCEDURE — 81001 URINALYSIS AUTO W/SCOPE: CPT

## 2023-02-21 PROCEDURE — 87086 URINE CULTURE/COLONY COUNT: CPT | Performed by: EMERGENCY MEDICINE

## 2023-02-21 PROCEDURE — 80053 COMPREHEN METABOLIC PANEL: CPT

## 2023-02-21 PROCEDURE — 85025 COMPLETE CBC W/AUTO DIFF WBC: CPT | Performed by: EMERGENCY MEDICINE

## 2023-02-21 PROCEDURE — 85025 COMPLETE CBC W/AUTO DIFF WBC: CPT

## 2023-02-21 PROCEDURE — 70450 CT HEAD/BRAIN W/O DYE: CPT

## 2023-02-21 PROCEDURE — 81001 URINALYSIS AUTO W/SCOPE: CPT | Performed by: EMERGENCY MEDICINE

## 2023-02-21 PROCEDURE — 80053 COMPREHEN METABOLIC PANEL: CPT | Performed by: EMERGENCY MEDICINE

## 2023-02-21 PROCEDURE — 99285 EMERGENCY DEPT VISIT HI MDM: CPT | Performed by: EMERGENCY MEDICINE

## 2023-02-21 PROCEDURE — 81025 URINE PREGNANCY TEST: CPT

## 2023-02-21 PROCEDURE — 96375 TX/PRO/DX INJ NEW DRUG ADDON: CPT | Performed by: EMERGENCY MEDICINE

## 2023-02-21 PROCEDURE — 99213 OFFICE O/P EST LOW 20 MIN: CPT

## 2023-02-21 PROCEDURE — 99284 EMERGENCY DEPT VISIT MOD MDM: CPT | Performed by: EMERGENCY MEDICINE

## 2023-02-21 PROCEDURE — 96374 THER/PROPH/DIAG INJ IV PUSH: CPT | Performed by: EMERGENCY MEDICINE

## 2023-02-21 RX ORDER — ONDANSETRON 2 MG/ML
4 INJECTION INTRAMUSCULAR; INTRAVENOUS ONCE
Status: COMPLETED | OUTPATIENT
Start: 2023-02-21 | End: 2023-02-21

## 2023-02-21 RX ORDER — METOCLOPRAMIDE HYDROCHLORIDE 5 MG/ML
10 INJECTION INTRAMUSCULAR; INTRAVENOUS ONCE
Status: COMPLETED | OUTPATIENT
Start: 2023-02-21 | End: 2023-02-21

## 2023-02-21 RX ORDER — KETOROLAC TROMETHAMINE 30 MG/ML
30 INJECTION, SOLUTION INTRAMUSCULAR; INTRAVENOUS ONCE
Status: COMPLETED | OUTPATIENT
Start: 2023-02-21 | End: 2023-02-21

## 2023-02-21 RX ORDER — DIPHENHYDRAMINE HYDROCHLORIDE 50 MG/ML
25 INJECTION INTRAMUSCULAR; INTRAVENOUS ONCE
Status: COMPLETED | OUTPATIENT
Start: 2023-02-21 | End: 2023-02-21

## 2023-02-21 NOTE — TELEPHONE ENCOUNTER
I called and spoke to pt. I advised her Dr. Alfonza Meigs would like her to go to the IC for eval of her blood pressure and she may possibly need an EKG.  Pt. Agreed to plan and verbalized understanding

## 2023-02-21 NOTE — ED INITIAL ASSESSMENT (HPI)
Pt presents from [facility] x1 week. Pt states she doesn't have hx of migraines. Pt states temporal pressure and nausea. Pt sent from [facility] with poss facial droop.  FAST negative

## 2023-02-21 NOTE — ED INITIAL ASSESSMENT (HPI)
Pt states has had a headache for last week. C/o pain to posterior head, bilateral temporal area, sinus pain, and pain to left side of neck when swallowing. States she noticed left side of face drooping this am.    Noticed bp elevated for last week.

## 2023-02-21 NOTE — TELEPHONE ENCOUNTER
I will like her to go to urgent care today for evaluation of her blood pressure may need some blood work possibly EKG.   Thank you

## 2023-02-21 NOTE — TELEPHONE ENCOUNTER
Pt is calling for an appointment with Dr Mayco Alva regarding her blood pressure rising. Pt states that her bp keeps going to 148/100. Pt also has headaches.     Please advise if we can schedule

## 2023-02-22 ENCOUNTER — TELEPHONE (OUTPATIENT)
Dept: FAMILY MEDICINE CLINIC | Facility: CLINIC | Age: 53
End: 2023-02-22

## 2023-02-22 NOTE — ED QUICK NOTES
Pt states she does not want to wait for neurologist to call back and wants to go home. ERMD notified.

## 2023-02-22 NOTE — TELEPHONE ENCOUNTER
Pt went to [facility] [place] [facility] yesterday for blood pressure. She was told to follow up with dr in 2 days. She only wants to see her dr & there was no appt. Please call.

## 2023-02-24 ENCOUNTER — OFFICE VISIT (OUTPATIENT)
Dept: FAMILY MEDICINE CLINIC | Facility: CLINIC | Age: 53
End: 2023-02-24
Payer: MEDICAID

## 2023-02-24 VITALS
HEIGHT: 64 IN | RESPIRATION RATE: 18 BRPM | TEMPERATURE: 98 F | BODY MASS INDEX: 21.34 KG/M2 | DIASTOLIC BLOOD PRESSURE: 80 MMHG | HEART RATE: 84 BPM | WEIGHT: 125 LBS | SYSTOLIC BLOOD PRESSURE: 112 MMHG

## 2023-02-24 DIAGNOSIS — R51.9 NEW ONSET HEADACHE: Primary | ICD-10-CM

## 2023-02-24 DIAGNOSIS — D72.819 LEUKOPENIA, UNSPECIFIED TYPE: ICD-10-CM

## 2023-02-24 DIAGNOSIS — R79.89 ELEVATED LIVER FUNCTION TESTS: ICD-10-CM

## 2023-02-24 DIAGNOSIS — R29.810 FACIAL DROOP: ICD-10-CM

## 2023-02-24 DIAGNOSIS — J01.90 ACUTE NON-RECURRENT SINUSITIS, UNSPECIFIED LOCATION: ICD-10-CM

## 2023-02-24 PROCEDURE — 99214 OFFICE O/P EST MOD 30 MIN: CPT | Performed by: FAMILY MEDICINE

## 2023-02-24 PROCEDURE — 3074F SYST BP LT 130 MM HG: CPT | Performed by: FAMILY MEDICINE

## 2023-02-24 PROCEDURE — 3008F BODY MASS INDEX DOCD: CPT | Performed by: FAMILY MEDICINE

## 2023-02-24 PROCEDURE — 3079F DIAST BP 80-89 MM HG: CPT | Performed by: FAMILY MEDICINE

## 2023-02-24 RX ORDER — METRONIDAZOLE 10 MG/G
1 GEL TOPICAL DAILY
COMMUNITY
Start: 2023-01-06 | End: 2023-02-24

## 2023-02-24 RX ORDER — FLUTICASONE PROPIONATE 50 MCG
2 SPRAY, SUSPENSION (ML) NASAL DAILY
Qty: 1 EACH | Refills: 1 | Status: SHIPPED | OUTPATIENT
Start: 2023-02-24 | End: 2024-02-19

## 2023-02-24 RX ORDER — LISINOPRIL 10 MG/1
10 TABLET ORAL DAILY
Qty: 30 TABLET | Refills: 1 | Status: SHIPPED | OUTPATIENT
Start: 2023-02-24 | End: 2024-02-19

## 2023-02-24 RX ORDER — AMOXICILLIN AND CLAVULANATE POTASSIUM 875; 125 MG/1; MG/1
1 TABLET, FILM COATED ORAL 2 TIMES DAILY
Qty: 20 TABLET | Refills: 0 | Status: SHIPPED | OUTPATIENT
Start: 2023-02-24 | End: 2023-03-06

## 2023-02-24 NOTE — PATIENT INSTRUCTIONS
Monitor blood pressure at home it should be less than 140 less than 90. If her blood pressure is above 140/90 take lisinopril 10 mg 1 tablet and recheck your blood pressure 2 hours later. Bring your blood pressure machine and blood pressure readings for next visit. Start antibiotic today per directions. Take probiotic over-the-counter daily like organic yogurt while taking antibiotic. Drink plenty of fluids. Take Tylenol or ibuprofen as needed for fever or for pain. Flonase 2 sprays nostril once a day. Call [phone] to schedule MRI of the brain. Do blood work prior next visit.

## 2023-03-16 ENCOUNTER — TELEPHONE (OUTPATIENT)
Dept: ADMINISTRATIVE | Facility: HOSPITAL | Age: 53
End: 2023-03-16

## 2023-03-16 NOTE — TELEPHONE ENCOUNTER
Please give them a call and see if peer to peer is still an option for us MRI approved for the patient. If we can do peer to peer peer scheduled for tomorrow at 1230.   Thank you

## 2023-03-16 NOTE — TELEPHONE ENCOUNTER
Good Morning, Please be advise that the MRI BRAIN (W+WO) (CPT=70553) was Denied by the [employer]. According to [facility] Dr. Talya Chaparro can call and initiate a P2P to be done to see if the Denial can be overturned.   ES#[id number] ZKJ#[phone] Option#4    Thanks,  Cielo Villarreal    Denial Reason:

## 2023-03-16 NOTE — TELEPHONE ENCOUNTER
S/w Krishna Diop at [place] at [phone] Option #4 to inquire peer to peer tomorrow at 12:30 PM.     Peer to peer scheduled for 3/17/23 at 12:30 PM with Raudel Graham MD.     I gave doctor's line phone number [phone] with alternate phone number [phone]

## 2023-03-17 NOTE — TELEPHONE ENCOUNTER
S/w pt. Advised of MRI being approved as below. Per referral notes it shows it is authorized as well as of today. Pt given number to schedule.   She voiced understanding

## 2023-03-17 NOTE — TELEPHONE ENCOUNTER
I did peer to peer authorization with . MRI was approved authorization # [id number] valid -March 17, 2023 until May 1, 2023. Please let the referral department know and patient know she can schedule the test she has to have it done before May 1 of this year.   Thank you

## 2023-03-28 ENCOUNTER — TELEPHONE (OUTPATIENT)
Facility: LOCATION | Age: 53
End: 2023-03-28

## 2023-03-28 DIAGNOSIS — Z12.31 ENCOUNTER FOR SCREENING MAMMOGRAM FOR BREAST CANCER: Primary | ICD-10-CM

## 2023-03-28 NOTE — TELEPHONE ENCOUNTER
S/W Adonis Ashley from [facility] she said that the order put in for this pt the DX needs to be changed to screening for the insurance to cover this .

## 2023-03-31 DIAGNOSIS — L71.0 PERIORAL DERMATITIS: ICD-10-CM

## 2023-03-31 DIAGNOSIS — F41.1 GAD (GENERALIZED ANXIETY DISORDER): ICD-10-CM

## 2023-04-01 RX ORDER — METRONIDAZOLE 10 MG/G
GEL TOPICAL
Qty: 60 G | Refills: 0 | Status: SHIPPED | OUTPATIENT
Start: 2023-04-01

## 2023-04-01 RX ORDER — ALPRAZOLAM 0.5 MG/1
TABLET ORAL
Qty: 30 TABLET | Refills: 1 | Status: SHIPPED | OUTPATIENT
Start: 2023-04-01

## 2023-04-12 ENCOUNTER — HOSPITAL ENCOUNTER (OUTPATIENT)
Dept: MRI IMAGING | Facility: HOSPITAL | Age: 53
Discharge: HOME | End: 2023-04-12
Attending: FAMILY MEDICINE
Payer: MEDICAID

## 2023-04-12 DIAGNOSIS — R51.9 NEW ONSET HEADACHE: ICD-10-CM

## 2023-04-12 DIAGNOSIS — R29.810 FACIAL DROOP: ICD-10-CM

## 2023-04-12 PROCEDURE — A9575 INJ GADOTERATE MEGLUMI 0.1ML: HCPCS | Performed by: FAMILY MEDICINE

## 2023-04-12 PROCEDURE — 70553 MRI BRAIN STEM W/O & W/DYE: CPT | Performed by: FAMILY MEDICINE

## 2023-04-12 RX ORDER — DIPHENHYDRAMINE HYDROCHLORIDE 50 MG/ML
10 INJECTION, SOLUTION INTRAMUSCULAR; INTRAVENOUS
Status: COMPLETED | OUTPATIENT
Start: 2023-04-12 | End: 2023-04-12

## 2023-04-12 RX ADMIN — DIPHENHYDRAMINE HYDROCHLORIDE 10 ML: 50 INJECTION, SOLUTION INTRAMUSCULAR; INTRAVENOUS at 13:32:00

## 2023-04-24 ENCOUNTER — LAB ENCOUNTER (OUTPATIENT)
Dept: LAB | Age: 53
End: 2023-04-24
Attending: FAMILY MEDICINE
Payer: MEDICAID

## 2023-04-24 DIAGNOSIS — E55.9 VITAMIN D DEFICIENCY: ICD-10-CM

## 2023-04-24 LAB
ALBUMIN SERPL-MCNC: 4.3 G/DL (ref 3.4–5)
ALBUMIN/GLOB SERPL: 1.2 {RATIO} (ref 1–2)
ALP LIVER SERPL-CCNC: 47 U/L
ALT SERPL-CCNC: 18 U/L
ANION GAP SERPL CALC-SCNC: 3 MMOL/L (ref 0–18)
AST SERPL-CCNC: 18 U/L (ref 15–37)
BASOPHILS # BLD AUTO: 0.06 X10(3) UL (ref 0–0.2)
BASOPHILS NFR BLD AUTO: 1.8 %
BILIRUB SERPL-MCNC: 0.4 MG/DL (ref 0.1–2)
BUN BLD-MCNC: 12 MG/DL (ref 7–18)
CALCIUM BLD-MCNC: 9.4 MG/DL (ref 8.5–10.1)
CHLORIDE SERPL-SCNC: 103 MMOL/L (ref 98–112)
CHOLEST SERPL-MCNC: 236 MG/DL (ref ?–200)
CO2 SERPL-SCNC: 30 MMOL/L (ref 21–32)
CREAT BLD-MCNC: 0.56 MG/DL
EOSINOPHIL # BLD AUTO: 0.1 X10(3) UL (ref 0–0.7)
EOSINOPHIL NFR BLD AUTO: 2.9 %
ERYTHROCYTE [DISTWIDTH] IN BLOOD BY AUTOMATED COUNT: 12.6 %
FASTING PATIENT LIPID ANSWER: NO
FASTING STATUS PATIENT QL REPORTED: NO
GFR SERPLBLD BASED ON 1.73 SQ M-ARVRAT: 111 ML/MIN/1.73M2 (ref 60–?)
GLOBULIN PLAS-MCNC: 3.5 G/DL (ref 2.8–4.4)
GLUCOSE BLD-MCNC: 89 MG/DL (ref 70–99)
HCT VFR BLD AUTO: 43.7 %
HDLC SERPL-MCNC: 108 MG/DL (ref 40–59)
HGB BLD-MCNC: 14.4 G/DL
IMM GRANULOCYTES # BLD AUTO: 0 X10(3) UL (ref 0–1)
IMM GRANULOCYTES NFR BLD: 0 %
LDLC SERPL CALC-MCNC: 109 MG/DL (ref ?–100)
LYMPHOCYTES # BLD AUTO: 1.76 X10(3) UL (ref 1–4)
LYMPHOCYTES NFR BLD AUTO: 51.8 %
MCH RBC QN AUTO: 29.4 PG (ref 26–34)
MCHC RBC AUTO-ENTMCNC: 33 G/DL (ref 31–37)
MCV RBC AUTO: 89.4 FL
MONOCYTES # BLD AUTO: 0.46 X10(3) UL (ref 0.1–1)
MONOCYTES NFR BLD AUTO: 13.5 %
NEUTROPHILS # BLD AUTO: 1.02 X10 (3) UL (ref 1.5–7.7)
NEUTROPHILS # BLD AUTO: 1.02 X10(3) UL (ref 1.5–7.7)
NEUTROPHILS NFR BLD AUTO: 30 %
NONHDLC SERPL-MCNC: 128 MG/DL (ref ?–130)
OSMOLALITY SERPL CALC.SUM OF ELEC: 281 MOSM/KG (ref 275–295)
PLATELET # BLD AUTO: 315 10(3)UL (ref 150–450)
POTASSIUM SERPL-SCNC: 3.9 MMOL/L (ref 3.5–5.1)
PROT SERPL-MCNC: 7.8 G/DL (ref 6.4–8.2)
RBC # BLD AUTO: 4.89 X10(6)UL
SODIUM SERPL-SCNC: 136 MMOL/L (ref 136–145)
TRIGL SERPL-MCNC: 115 MG/DL (ref 30–149)
VIT D+METAB SERPL-MCNC: 64.6 NG/ML (ref 30–100)
VLDLC SERPL CALC-MCNC: 19 MG/DL (ref 0–30)
WBC # BLD AUTO: 3.4 X10(3) UL (ref 4–11)

## 2023-04-24 PROCEDURE — 80053 COMPREHEN METABOLIC PANEL: CPT | Performed by: FAMILY MEDICINE

## 2023-04-24 PROCEDURE — 85025 COMPLETE CBC W/AUTO DIFF WBC: CPT | Performed by: FAMILY MEDICINE

## 2023-04-24 PROCEDURE — 82306 VITAMIN D 25 HYDROXY: CPT

## 2023-04-24 PROCEDURE — 80061 LIPID PANEL: CPT | Performed by: FAMILY MEDICINE

## 2023-04-25 ENCOUNTER — HOSPITAL ENCOUNTER (OUTPATIENT)
Dept: MAMMOGRAPHY | Facility: HOSPITAL | Age: 53
Discharge: HOME | End: 2023-04-25
Attending: SURGERY
Payer: MEDICAID

## 2023-04-25 PROCEDURE — 77063 BREAST TOMOSYNTHESIS BI: CPT | Performed by: SURGERY

## 2023-04-25 PROCEDURE — 77067 SCR MAMMO BI INCL CAD: CPT | Performed by: SURGERY

## 2023-05-01 ENCOUNTER — TELEMEDICINE (OUTPATIENT)
Dept: FAMILY MEDICINE CLINIC | Facility: CLINIC | Age: 53
End: 2023-05-01
Payer: MEDICAID

## 2023-05-01 DIAGNOSIS — F51.01 PRIMARY INSOMNIA: ICD-10-CM

## 2023-05-01 DIAGNOSIS — G43.909 MIGRAINE WITHOUT STATUS MIGRAINOSUS, NOT INTRACTABLE, UNSPECIFIED MIGRAINE TYPE: Primary | ICD-10-CM

## 2023-05-01 DIAGNOSIS — R53.83 FATIGUE, UNSPECIFIED TYPE: ICD-10-CM

## 2023-05-01 DIAGNOSIS — D72.819 LEUKOPENIA, UNSPECIFIED TYPE: ICD-10-CM

## 2023-05-01 DIAGNOSIS — F41.1 GAD (GENERALIZED ANXIETY DISORDER): ICD-10-CM

## 2023-05-01 DIAGNOSIS — E55.9 VITAMIN D DEFICIENCY: ICD-10-CM

## 2023-05-01 DIAGNOSIS — E78.00 HYPERCHOLESTEROLEMIA: ICD-10-CM

## 2023-05-01 RX ORDER — ALPRAZOLAM 0.5 MG/1
0.5 TABLET ORAL
Qty: 30 TABLET | Refills: 2 | Status: SHIPPED | OUTPATIENT
Start: 2023-05-01

## 2023-05-01 RX ORDER — VENLAFAXINE HYDROCHLORIDE 37.5 MG/1
CAPSULE, EXTENDED RELEASE ORAL
Qty: 60 CAPSULE | Refills: 3 | Status: SHIPPED | OUTPATIENT
Start: 2023-05-01

## 2023-05-01 RX ORDER — SUMATRIPTAN 50 MG/1
50 TABLET, FILM COATED ORAL EVERY 2 HOUR PRN
Qty: 9 TABLET | Refills: 1 | Status: SHIPPED | OUTPATIENT
Start: 2023-05-01

## 2023-05-02 NOTE — PATIENT INSTRUCTIONS
Increase venlafaxine 37.5 to 2 capsules a day. Use Xanax as needed for anxiety. Try sumatriptan 50 mg 1 tablet at the beginning of the headache you can repeat another tablet 2 hours later. Set up an appointment with neurologist for evaluation of your migraines. Low-fat diet. Stay active. Recheck blood work fasting before next visit set up an appointment in 3- 4  months.

## 2023-05-09 ENCOUNTER — HOSPITAL ENCOUNTER (OUTPATIENT)
Dept: MAMMOGRAPHY | Facility: HOSPITAL | Age: 53
Discharge: HOME | End: 2023-05-09
Attending: SURGERY
Payer: MEDICAID

## 2023-05-09 DIAGNOSIS — R92.2 INCONCLUSIVE MAMMOGRAM: ICD-10-CM

## 2023-09-18 DIAGNOSIS — F41.1 GAD (GENERALIZED ANXIETY DISORDER): ICD-10-CM

## 2023-09-18 RX ORDER — VENLAFAXINE HYDROCHLORIDE 37.5 MG/1
CAPSULE, EXTENDED RELEASE ORAL
Qty: 60 CAPSULE | Refills: 0 | Status: SHIPPED | OUTPATIENT
Start: 2023-09-18

## 2024-01-04 ENCOUNTER — TELEPHONE (OUTPATIENT)
Dept: FAMILY MEDICINE CLINIC | Facility: CLINIC | Age: 54
End: 2024-01-04

## 2024-01-04 DIAGNOSIS — Z00.00 LABORATORY TESTS ORDERED AS PART OF A COMPLETE PHYSICAL EXAM (CPE): Primary | ICD-10-CM

## 2024-01-04 NOTE — TELEPHONE ENCOUNTER
Per pt requested for lab orders be changed from [facility] to [facility]. New labs were ordered for [facility] and preferred lab was changed. Copied dx codes on the original [facility] labs Dr. CALVILLO placed at pts last visit.

## 2024-01-18 ENCOUNTER — LAB ENCOUNTER (OUTPATIENT)
Dept: LAB | Age: 54
End: 2024-01-18
Attending: FAMILY MEDICINE
Payer: MEDICAID

## 2024-01-18 DIAGNOSIS — Z00.00 LABORATORY TESTS ORDERED AS PART OF A COMPLETE PHYSICAL EXAM (CPE): ICD-10-CM

## 2024-01-18 LAB
ALBUMIN SERPL-MCNC: 4.2 G/DL (ref 3.4–5)
ALBUMIN/GLOB SERPL: 1.4 {RATIO} (ref 1–2)
ALP LIVER SERPL-CCNC: 36 U/L
ALT SERPL-CCNC: 31 U/L
ANION GAP SERPL CALC-SCNC: 6 MMOL/L (ref 0–18)
AST SERPL-CCNC: 21 U/L (ref 15–37)
BASOPHILS # BLD AUTO: 0.03 X10(3) UL (ref 0–0.2)
BASOPHILS NFR BLD AUTO: 1 %
BILIRUB SERPL-MCNC: 0.6 MG/DL (ref 0.1–2)
BILIRUB UR QL STRIP.AUTO: NEGATIVE
BUN BLD-MCNC: 14 MG/DL (ref 9–23)
CALCIUM BLD-MCNC: 9.1 MG/DL (ref 8.5–10.1)
CHLORIDE SERPL-SCNC: 107 MMOL/L (ref 98–112)
CHOLEST SERPL-MCNC: 218 MG/DL (ref ?–200)
CO2 SERPL-SCNC: 28 MMOL/L (ref 21–32)
COLOR UR AUTO: YELLOW
CREAT BLD-MCNC: 0.6 MG/DL
EGFRCR SERPLBLD CKD-EPI 2021: 109 ML/MIN/1.73M2 (ref 60–?)
EOSINOPHIL # BLD AUTO: 0.03 X10(3) UL (ref 0–0.7)
EOSINOPHIL NFR BLD AUTO: 1 %
ERYTHROCYTE [DISTWIDTH] IN BLOOD BY AUTOMATED COUNT: 12.8 %
FASTING PATIENT LIPID ANSWER: YES
FASTING STATUS PATIENT QL REPORTED: YES
GLOBULIN PLAS-MCNC: 3 G/DL (ref 2.8–4.4)
GLUCOSE BLD-MCNC: 84 MG/DL (ref 70–99)
GLUCOSE UR STRIP.AUTO-MCNC: NORMAL MG/DL
HCT VFR BLD AUTO: 40.9 %
HDLC SERPL-MCNC: 114 MG/DL (ref 40–59)
HGB BLD-MCNC: 13.8 G/DL
IMM GRANULOCYTES # BLD AUTO: 0 X10(3) UL (ref 0–1)
IMM GRANULOCYTES NFR BLD: 0 %
KETONES UR STRIP.AUTO-MCNC: 20 MG/DL
LDLC SERPL CALC-MCNC: 96 MG/DL (ref ?–100)
LEUKOCYTE ESTERASE UR QL STRIP.AUTO: 25
LYMPHOCYTES # BLD AUTO: 1.25 X10(3) UL (ref 1–4)
LYMPHOCYTES NFR BLD AUTO: 41.5 %
MCH RBC QN AUTO: 30.5 PG (ref 26–34)
MCHC RBC AUTO-ENTMCNC: 33.7 G/DL (ref 31–37)
MCV RBC AUTO: 90.5 FL
MONOCYTES # BLD AUTO: 0.33 X10(3) UL (ref 0.1–1)
MONOCYTES NFR BLD AUTO: 11 %
NEUTROPHILS # BLD AUTO: 1.37 X10 (3) UL (ref 1.5–7.7)
NEUTROPHILS # BLD AUTO: 1.37 X10(3) UL (ref 1.5–7.7)
NEUTROPHILS NFR BLD AUTO: 45.5 %
NITRITE UR QL STRIP.AUTO: NEGATIVE
NONHDLC SERPL-MCNC: 104 MG/DL (ref ?–130)
OSMOLALITY SERPL CALC.SUM OF ELEC: 292 MOSM/KG (ref 275–295)
PH UR STRIP.AUTO: 7 [PH] (ref 5–8)
PLATELET # BLD AUTO: 287 10(3)UL (ref 150–450)
POTASSIUM SERPL-SCNC: 3.9 MMOL/L (ref 3.5–5.1)
PROT SERPL-MCNC: 7.2 G/DL (ref 6.4–8.2)
PROT UR STRIP.AUTO-MCNC: 50 MG/DL
RBC # BLD AUTO: 4.52 X10(6)UL
RBC UR QL AUTO: NEGATIVE
SODIUM SERPL-SCNC: 141 MMOL/L (ref 136–145)
SP GR UR STRIP.AUTO: 1.03 (ref 1–1.03)
TRIGL SERPL-MCNC: 45 MG/DL (ref 30–149)
TSI SER-ACNC: 0.73 MIU/ML (ref 0.36–3.74)
UROBILINOGEN UR STRIP.AUTO-MCNC: NORMAL MG/DL
VIT D+METAB SERPL-MCNC: 63.1 NG/ML (ref 30–100)
VLDLC SERPL CALC-MCNC: 7 MG/DL (ref 0–30)
WBC # BLD AUTO: 3 X10(3) UL (ref 4–11)

## 2024-01-18 PROCEDURE — 80053 COMPREHEN METABOLIC PANEL: CPT

## 2024-01-18 PROCEDURE — 84443 ASSAY THYROID STIM HORMONE: CPT

## 2024-01-18 PROCEDURE — 85025 COMPLETE CBC W/AUTO DIFF WBC: CPT

## 2024-01-18 PROCEDURE — 87086 URINE CULTURE/COLONY COUNT: CPT

## 2024-01-18 PROCEDURE — 81001 URINALYSIS AUTO W/SCOPE: CPT

## 2024-01-18 PROCEDURE — 82306 VITAMIN D 25 HYDROXY: CPT

## 2024-01-18 PROCEDURE — 80061 LIPID PANEL: CPT

## 2024-01-26 DIAGNOSIS — F51.01 PRIMARY INSOMNIA: ICD-10-CM

## 2024-01-26 DIAGNOSIS — F41.1 GAD (GENERALIZED ANXIETY DISORDER): ICD-10-CM

## 2024-01-26 DIAGNOSIS — L71.0 PERIORAL DERMATITIS: ICD-10-CM

## 2024-01-30 RX ORDER — METRONIDAZOLE 10 MG/G
GEL TOPICAL
Qty: 60 G | Refills: 0 | OUTPATIENT
Start: 2024-01-30

## 2024-01-30 RX ORDER — ALPRAZOLAM 0.5 MG/1
0.5 TABLET ORAL
Qty: 30 TABLET | Refills: 0 | Status: SHIPPED | OUTPATIENT
Start: 2024-01-30

## 2024-01-30 RX ORDER — ZOLPIDEM TARTRATE 5 MG/1
5 TABLET ORAL NIGHTLY PRN
Qty: 30 TABLET | Refills: 0 | Status: SHIPPED | OUTPATIENT
Start: 2024-01-30

## 2024-01-30 NOTE — TELEPHONE ENCOUNTER
LOV: 11/29/2023  for: Anxiety, sleeping difficulty, neutropenia.  Patient advised to RTC on:  Feb 2024.   Nov:02/19/2024    Medication Quantity Refills Start End   zolpidem 5 MG Oral Tab 30 tablet 2 12/27/2022 --   Sig:   Take 1 tablet (5 mg total) by mouth nightly as needed for Sleep.       Medication Quantity Refills Start End   ALPRAZolam 0.5 MG Oral Tab 30 tablet 1 11/29/2023 --   Sig:   Take 1 tablet (0.5 mg total) by mouth daily as needed.              Medication Quantity Refills Start End   METRONIDAZOLE 1 % External Gel (Discontinued) 60 g 0 4/1/2023 11/29/2023   Sig:   APPLY TOPICALLY TO THE AFFECTED AREA DAILY.

## 2024-02-21 ENCOUNTER — OFFICE VISIT (OUTPATIENT)
Dept: FAMILY MEDICINE CLINIC | Facility: CLINIC | Age: 54
End: 2024-02-21
Payer: MEDICAID

## 2024-02-21 VITALS
HEIGHT: 64 IN | RESPIRATION RATE: 16 BRPM | WEIGHT: 177.63 LBS | BODY MASS INDEX: 30.32 KG/M2 | HEART RATE: 76 BPM | DIASTOLIC BLOOD PRESSURE: 68 MMHG | SYSTOLIC BLOOD PRESSURE: 100 MMHG | TEMPERATURE: 97 F

## 2024-02-21 DIAGNOSIS — Z12.11 SCREEN FOR COLON CANCER: ICD-10-CM

## 2024-02-21 DIAGNOSIS — Z00.00 PHYSICAL EXAM, ANNUAL: Primary | ICD-10-CM

## 2024-02-21 DIAGNOSIS — D72.819 LEUKOPENIA, UNSPECIFIED TYPE: ICD-10-CM

## 2024-02-21 DIAGNOSIS — R80.9 PROTEINURIA, UNSPECIFIED TYPE: ICD-10-CM

## 2024-02-21 DIAGNOSIS — E78.00 HYPERCHOLESTEROLEMIA: ICD-10-CM

## 2024-02-21 PROCEDURE — 99396 PREV VISIT EST AGE 40-64: CPT | Performed by: FAMILY MEDICINE

## 2024-02-21 RX ORDER — FLUCONAZOLE 150 MG/1
TABLET ORAL
COMMUNITY
Start: 2024-01-08

## 2024-02-21 NOTE — PATIENT INSTRUCTIONS
Shingrix- shingles shot.  Healthy diet.  Keep good hydration.  Decrease cheese.  Check blood work in May prior next visit.  Schedule medication check for May 2024.  Follow-up with GYN as scheduled.

## 2024-02-21 NOTE — PROGRESS NOTES
HPI:   Brianna Logan is a 53 year old female who presents for a complete physical exam. Symptoms: denies discharge, itching, burning or dysuria.    Patient had some urinary symptoms recently.  Diagnosed with 5 cm fibroid she saw OB/GYN they monitor for now.  May need to have those removed.  Patient is seeing GYN.  Will obtain Pap smear from them.  Colonoscopy was done 2015 we will do the fit test this year.  Recommended shingles vaccination.    Tdap is up-to-date.  Also discussed with patient flu and COVID vaccinations.    .  Immunization History   Administered Date(s) Administered    TDAP 05/29/2019      Wt Readings from Last 6 Encounters:   02/21/24 177 lb 9.6 oz (80.6 kg)   11/29/23 119 lb (54 kg)   02/24/23 125 lb (56.7 kg)   02/21/23 118 lb (53.5 kg)   02/21/23 118 lb (53.5 kg)   12/27/22 124 lb (56.2 kg)     Body mass index is 30.48 kg/m².     Cholesterol, Total (mg/dL)   Date Value   01/18/2024 218 (H)   04/24/2023 236 (H)   06/20/2022 200 (H)     HDL Cholesterol (mg/dL)   Date Value   01/18/2024 114 (H)   04/24/2023 108 (H)   06/20/2022 96 (H)     LDL Cholesterol (mg/dL)   Date Value   01/18/2024 96   04/24/2023 109 (H)   06/20/2022 93     AST (U/L)   Date Value   01/18/2024 21   04/24/2023 18   02/21/2023 45 (H)   02/20/2014 11 (L)   12/16/2013 10 (L)   03/31/2011 15   03/28/2011 50 (H)     ALT (U/L)   Date Value   01/18/2024 31   04/24/2023 18   02/21/2023 25   02/20/2014 20   12/16/2013 21   03/31/2011 21   03/28/2011 63 (H)      No results found for: \"GLUCOSE\"     Current Outpatient Medications   Medication Sig Dispense Refill    fluconazole 150 MG Oral Tab       ZOLPIDEM 5 MG Oral Tab TAKE 1 TABLET(5 MG) BY MOUTH EVERY NIGHT AS NEEDED FOR SLEEP 30 tablet 0    ALPRAZOLAM 0.5 MG Oral Tab TAKE 1 TABLET(0.5 MG) BY MOUTH DAILY AS NEEDED 30 tablet 0    SUMAtriptan (IMITREX) 50 MG Oral Tab Take 1 tablet (50 mg total) by mouth every 2 (two) hours as needed for Migraine. Use at onset; repeat once after 2  HRS-ONLY 2 IN 24 HR MAX 9 tablet 1    folic acid 1 MG Oral Tab Take 1 tablet (1 mg total) by mouth daily. 90 tablet 1    lisinopril 10 MG Oral Tab Take 1 tablet (10 mg total) by mouth daily. 30 tablet 0    Multiple Vitamins-Minerals (MULTIVITAMIN ADULT) Oral Tab Take by mouth.      venlafaxine ER 37.5 MG Oral Capsule SR 24 Hr Take 1 capsule twice a day. 60 capsule 0      Past Medical History:   Diagnosis Date    Anxiety     Anxiety state, unspecified     Breast CA (HCC) 07/2015    DCIS    Depression     DJD (degenerative joint disease), lumbar     Headache(784.0)     Hiatal hernia     Insomnia, unspecified     Intestinal infection due to Clostridium difficile     Multinodular goiter     Vaginitis and vulvovaginitis, unspecified       Past Surgical History:   Procedure Laterality Date    APPENDECTOMY  1989    COLONOSCOPY  6/2016    D & C  2009    SAB    EGD  6/2016    LUMPECTOMY RIGHT  07/2015    DCIS      Family History   Problem Relation Age of Onset    Other (acute venous thrombosis of the deep vessels of the lower extremity) Mother 63    Cancer Maternal Grandfather         rectal cancer    Other (Other) Father 55        choked on food    Breast Cancer Self 45      Social History:   Social History     Socioeconomic History    Marital status:     Number of children: 1   Occupational History    Occupation: haristylist     Comment: unemployed   Tobacco Use    Smoking status: Never    Smokeless tobacco: Never   Vaping Use    Vaping Use: Never used   Substance and Sexual Activity    Alcohol use: No     Alcohol/week: 0.0 standard drinks of alcohol    Drug use: No    Sexual activity: Yes     Partners: Male   Other Topics Concern    Caffeine Concern Yes     Comment: 3 cups daily    Exercise Yes     Comment: hiking 3 times a week    Seat Belt Yes   Social History Narrative    Daughter is 10 yo.    Here with .    She does not work outside the home.     No transportation problems.    She is [facility].              Occ:   Home : yes. Children: one   Exercise: three times per week.  Diet: watches calories closely     REVIEW OF SYSTEMS:   GENERAL: feels well otherwise  SKIN: denies any unusual skin lesions  EYES:denies blurred vision or double vision  HEENT: denies nasal congestion, sinus pain or ST  LUNGS: denies shortness of breath with exertion  CARDIOVASCULAR:  chest pain on exertion when running  GI: denies abdominal pain,denies heartburn  : denies dysuria, vaginal discharge or itching,periods regular, having urinary frequency some pelvic pressure.  MUSCULOSKELETAL: denies back pain  NEURO: denies headaches  PSYCHE: denies depression , feeling anxiety  HEMATOLOGIC: denies hx of anemia  ENDOCRINE: denies thyroid history  ALL/ASTHMA: denies hx of allergy or asthma    EXAM:   /68 (BP Location: Left arm, Patient Position: Sitting, Cuff Size: adult)   Pulse 76   Temp 97 °F (36.1 °C) (Temporal)   Resp 16   Ht 5' 4\" (1.626 m)   Wt 177 lb 9.6 oz (80.6 kg)   LMP 02/02/2023 (Exact Date)   BMI 30.48 kg/m²   Body mass index is 30.48 kg/m².   GENERAL: well developed, well nourished,in no apparent distress  SKIN: no rashes,no suspicious lesions  HEENT: atraumatic, normocephalic,ears and throat are clear  EYES:PERRLA, EOMI, ,conjunctiva are clear  NECK: supple,no adenopathy  CHEST: no chest tenderness  BREAST: Deferred by patient mammogram was done.  LUNGS: clear to auscultation  CARDIO: RRR without murmur  GI: good BS's,no masses, HSM or tenderness  : Done by GYN  RECTAL: Deferred  MUSCULOSKELETAL: back is not tender,FROM of the back  EXTREMITIES: no cyanosis, clubbing or edema  NEURO: Oriented times three,cranial nerves are intact,motor and sensory are grossly intact    ASSESSMENT AND PLAN:   Brianna Logan is a 53 year old female who presents for a complete physical exam.  Encounter Diagnoses   Name Primary?    Physical exam, annual Yes    Hypercholesterolemia     Leukopenia, unspecified type      Proteinuria, unspecified type     Screen for colon cancer        Orders Placed This Encounter   Procedures    CBC With Differential With Platelet    UA/M With Culture Reflex [E]    Occult Blood, Fecal, Immunoassay (Blue cards) [E]       Meds & Refills for this Visit:  Requested Prescriptions      No prescriptions requested or ordered in this encounter     Shingrix- shingles shot.  Healthy diet.  Keep good hydration.  Decrease cheese.  Check blood work in May prior next visit.  Schedule medication check for May 2024.  Follow-up with GYN as scheduled.    Imaging & Consults:  None  Pap and pelvic done by GYN.. Self breast exam explained. Health maintenance, will check fasting Lipids, CMP, and CBC. Pt will be referred for screening colonoscopy in 2025 Pt' s weight is Body mass index is 30.48 kg/m²., recommended low carb diet and aerobic exercise 30 minutes three times weekly.  The patient indicates understanding of these issues and agrees to the plan.  The patient is asked to return for CPX in 1 year.   Medication check May 2024.

## 2024-02-22 ENCOUNTER — MED REC SCAN ONLY (OUTPATIENT)
Dept: FAMILY MEDICINE CLINIC | Facility: CLINIC | Age: 54
End: 2024-02-22

## 2024-02-23 ENCOUNTER — TELEPHONE (OUTPATIENT)
Dept: FAMILY MEDICINE CLINIC | Facility: CLINIC | Age: 54
End: 2024-02-23

## 2024-02-23 NOTE — TELEPHONE ENCOUNTER
Patient signed medical records request form in office to receive records from Dr Inessa Cullen. Request form faxed to [phone] on 2/23/24.    Request form also sent to scanning to be scanned to patient chart.

## 2024-05-10 DIAGNOSIS — F51.01 PRIMARY INSOMNIA: ICD-10-CM

## 2024-05-10 NOTE — TELEPHONE ENCOUNTER
Last office visit: 2/21/2024   Labs last completed: 1/18/2024  Requested medication(s) are due for refill today: yes  Requested medication(s) are on the active medication list same strength, form, dose/ sig: yes  Requested medication(s) are managed by provider: yes  Patient has already received a courtsey refill: no    NOV: 5/13/2024

## 2024-05-12 NOTE — PROGRESS NOTES
Brianna Logan is a 53 year old female.cc abdominal pain, gas bloating.   anxiety, sleeping difficulty, neutropenia,  HPI:   P patient complains of having abdominal pain which started 2 weeks ago initially was due to her started have a bloating decreased appetite.  Had a lot of gas.  1 week ago started have abdominal pain it seems that the pain is in the periumbilical and also in the epigastric area.  Had some pain in the lower pelvic area.  She saw OB/GYN on Friday last week ultrasound was ordered which she is going to do next week.  Urination is normal.  Bowel movements are normal.  Had some heartburn.  Tuesday last week she went to urgent care had H. pylori test which came back negative.  Was recommended to see GI doctor.  She set up an appointment for May 24.  Still having pain.  Appetite is decreased lost some weight.  Not feeling good.  Tenderness to palpation over the abdomen we will proceed with imaging test.    Anxiety patient was on venlafaxine right now she is doing well.  Stop medication on her own and she is says that she is fine.  Does not feel that she needs medication.  On PRANEETH-7 patient says that over the last week several days she is feeling nervous anxious on the edge, not being able to stop or control worrying, running too much about different things, having trouble to relax and feeling that afraid that something awful might be happening.  PRANEETH-7 score came back at 5.    On PHQ-9 patient says that over the last 2 weeks nearly every day she is having trouble to sleep at night and having been not eating well.  More than half days within 2 weeks feeling tired having little energy.  PHQ-9 score came back at 8.  No suicidal no homicidal.  Patient is using Xanax as needed prescription was called in.    Patient had neutropenia we will  monitor levels.     Patient sleeping difficulty she is not using zolpidem anymore she is using Xanax as needed only for anxiety would like prescription for Xanax.      Vitamin D deficiency was low in the past,  monitor levels.     Migraines patient is getting headaches in the front of the head on the scalp.  She is using Imitrex as needed.  Would like new prescription.  Her daughter has migraines.     Current Outpatient Medications   Medication Sig Dispense Refill   • SUMAtriptan (IMITREX) 50 MG Oral Tab Take 1 tablet (50 mg total) by mouth every 2 (two) hours as needed for Migraine. Use at onset; repeat once after 2 HRS-ONLY 2 IN 24 HR MAX 9 tablet 2   • zolpidem 5 MG Oral Tab Take 1 tablet (5 mg total) by mouth nightly as needed for Sleep. 30 tablet 2   • ALPRAZolam 0.5 MG Oral Tab Take 1 tablet (0.5 mg total) by mouth daily as needed. 30 tablet 2   • folic acid 1 MG Oral Tab Take 1 tablet (1 mg total) by mouth daily. 90 tablet 1   • dicyclomine 10 MG Oral Cap Take 1 capsule (10 mg total) by mouth.     • ZOLPIDEM 5 MG Oral Tab TAKE 1 TABLET(5 MG) BY MOUTH EVERY NIGHT AS NEEDED FOR SLEEP 30 tablet 0   • fluconazole 150 MG Oral Tab      • venlafaxine ER 37.5 MG Oral Capsule SR 24 Hr Take 1 capsule twice a day. 60 capsule 0   • lisinopril 10 MG Oral Tab Take 1 tablet (10 mg total) by mouth daily. 30 tablet 0   • Multiple Vitamins-Minerals (MULTIVITAMIN ADULT) Oral Tab Take by mouth.        Past Medical History:   • Anxiety   • Anxiety state, unspecified   • Breast CA (HCC)    DCIS   • Depression   • DJD (degenerative joint disease), lumbar   • Headache(784.0)   • Hiatal hernia   • Insomnia, unspecified   • Intestinal infection due to Clostridium difficile   • Multinodular goiter   • Vaginitis and vulvovaginitis, unspecified      Social History:  Social History     Socioeconomic History   • Marital status:    • Number of children: 1   Occupational History   • Occupation: haristylist     Comment: unemployed   Tobacco Use   • Smoking status: Never   • Smokeless tobacco: Never   Vaping Use   • Vaping status: Never Used   Substance and Sexual Activity   • Alcohol use: No      Alcohol/week: 0.0 standard drinks of alcohol   • Drug use: No   • Sexual activity: Yes     Partners: Male   Other Topics Concern   • Caffeine Concern Yes     Comment: 3 cups daily   • Exercise Yes     Comment: hiking 3 times a week   • Seat Belt Yes   Social History Narrative    Daughter is 10 yo.    Here with .    She does not work outside the home.     No transportation problems.    She is [facility].             Social Determinants of Health      Received [facility], [facility]    Social Connections    Received from [facility]        REVIEW OF SYSTEMS:   GENERAL HEALTH: feels well otherwise decreased appetite  SKIN:  rash around the mouth and nose  HEENT no congestion no runny nose no sore throat  Neck no neck pain  RESPIRATORY: denies shortness of breath with exertion  CARDIOVASCULAR: denies chest pain on exertion  GI: Abdominal pain, having some heartburn  NEURO: denies headaches  Psych normal mood    EXAM:   /73 (BP Location: Right arm, Patient Position: Sitting, Cuff Size: adult)   Pulse 82   Temp 97.2 °F (36.2 °C) (Temporal)   Resp 12   Ht 5' 4\" (1.626 m)   Wt 110 lb (49.9 kg)   LMP 02/02/2023 (Exact Date)   BMI 18.88 kg/m²   GENERAL: well developed, well nourished,in no apparent distress  SKIN: no rash  HEENT: atraumatic, normocephalic,ears and throat are clear  NECK: supple,no adenopathy,  LUNGS: clear to auscultation  CARDIO: RRR without murmur  GI: good BS's,no masses, tenderness to palpation periumbilical and upper abdomen no rebound no guarding, no CVA tenderness  EXTREMITIES: no  edema  Psychiatric - alert  and oriented x3, normal mood     Results for orders placed or performed in visit on 05/13/24   URINALYSIS, AUTO, W/O SCOPE   Result Value Ref Range    Glucose Urine Negative Negative mg/dL    Bilirubin Urine Negative Negative    Ketones, UA Negative Negative - Trace mg/dL    Spec Gravity 1.015 1.005 - 1.030    Blood  Urine Negative Negative    PH Urine 8.0 5.0 - 8.0    Protein Urine Negative Negative - Trace mg/dL    Urobilinogen Urine 0.2 0.2 - 1.0 mg/dL    Nitrite Urine Negative Negative    Leukocyte Esterase Urine Small (A) Negative    APPEARANCE Clear Clear    Color Urine Yellow Yellow    Multistix Lot# [id number] Numeric    Multistix Expiration Date 08/31/2024 Date     ASSESSMENT AND PLAN:     Encounter Diagnoses   Name Primary?   • Migraine without status migrainosus, not intractable, unspecified migraine type Yes   • Primary insomnia    • PRANEETH (generalized anxiety disorder)    • Abdominal pain, acute, epigastric    • Left lateral abdominal pain    • Abdominal bloating    • History of breast cancer      Continue sumatriptan refill was called in    Insomnia zolpidem refill was called in stable    Anxiety patient is doing well you Xanax as needed only.    Abdominal pain we will proceed with CT scan this is new complaint.  Having tenderness in the lateral abdomen and epigastric area.  Blood work ordered patient will have it completed    Abdominal bloating as above monitor    History of breast cancer seeing specialist monitored by them.    Orders Placed This Encounter   Procedures   • Comp Metabolic Panel (14)   • CBC With Differential With Platelet   • Lipase [E]   • URINALYSIS, AUTO, W/O SCOPE   • Urine Culture, Routine       Meds & Refills for this Visit:  Requested Prescriptions     Signed Prescriptions Disp Refills   • SUMAtriptan (IMITREX) 50 MG Oral Tab 9 tablet 2     Sig: Take 1 tablet (50 mg total) by mouth every 2 (two) hours as needed for Migraine. Use at onset; repeat once after 2 HRS-ONLY 2 IN 24 HR MAX   • zolpidem 5 MG Oral Tab 30 tablet 2     Sig: Take 1 tablet (5 mg total) by mouth nightly as needed for Sleep.   • ALPRAZolam 0.5 MG Oral Tab 30 tablet 2     Sig: Take 1 tablet (0.5 mg total) by mouth daily as needed.   • folic acid 1 MG Oral Tab 90 tablet 1     Sig: Take 1 tablet (1 mg total) by mouth daily.   PHQ-9  score 8.  PRANEETH-7 score 5.    Do CT scan.   Do Blood work.  Further recommendations pending test results.    Imaging & Consults:  CT ABDOMEN+PELVIS(CONTRAST ONLY)(CPT=74177)    The patient indicates understanding of these issues and agrees to the plan.  The patient is asked to return in 3- 4  months or sooner as needed.  The note was dictated using speech recognition software.  Accuracy and grammar in transcription may be subject to error.

## 2024-05-12 NOTE — PROGRESS NOTES
Brianna Logan is a 53 year old female.cc abdominal pain, gas bloating.   anxiety, sleeping difficulty, neutropenia,  HPI:   P patient complains of having abdominal pain which started 2 weeks ago initially was due to her started have a bloating decreased appetite.  Had a lot of gas.  1 week ago started have abdominal pain it seems that the pain is in the periumbilical and also in the epigastric area.  Had some pain in the lower pelvic area.  She saw OB/GYN on Friday last week ultrasound was ordered which she is going to do next week.  Urination is normal.  Bowel movements are normal.  Had some heartburn.  Tuesday last week she went to urgent care had H. pylori test which came back negative.  Was recommended to see GI doctor.  She set up an appointment for May 24.  Still having pain.  Appetite is decreased lost some weight.  Not feeling good.  Tenderness to palpation over the abdomen we will proceed with imaging test.    Anxiety patient was on venlafaxine right now she is doing well.  Stop medication on her own and she is says that she is fine.  Does not feel that she needs medication.  On PRANEETH-7 patient says that over the last week several days she is feeling nervous anxious on the edge, not being able to stop or control worrying, running too much about different things, having trouble to relax and feeling that afraid that something awful might be happening.  PRANEETH-7 score came back at 5.    On PHQ-9 patient says that over the last 2 weeks nearly every day she is having trouble to sleep at night and having been not eating well.  More than half days within 2 weeks feeling tired having little energy.  PHQ-9 score came back at 8.  No suicidal no homicidal.  Patient is using Xanax as needed prescription was called in.    Patient had neutropenia we will  monitor levels.     Patient sleeping difficulty she is not using zolpidem anymore she is using Xanax as needed only for anxiety would like prescription for Xanax.      Vitamin D deficiency was low in the past,  monitor levels.     Migraines patient is getting headaches in the front of the head on the scalp.  She is using Imitrex as needed.  Would like new prescription.  Her daughter has migraines.     Current Outpatient Medications   Medication Sig Dispense Refill   • SUMAtriptan (IMITREX) 50 MG Oral Tab Take 1 tablet (50 mg total) by mouth every 2 (two) hours as needed for Migraine. Use at onset; repeat once after 2 HRS-ONLY 2 IN 24 HR MAX 9 tablet 2   • zolpidem 5 MG Oral Tab Take 1 tablet (5 mg total) by mouth nightly as needed for Sleep. 30 tablet 2   • ALPRAZolam 0.5 MG Oral Tab Take 1 tablet (0.5 mg total) by mouth daily as needed. 30 tablet 2   • folic acid 1 MG Oral Tab Take 1 tablet (1 mg total) by mouth daily. 90 tablet 1   • dicyclomine 10 MG Oral Cap Take 1 capsule (10 mg total) by mouth.     • ZOLPIDEM 5 MG Oral Tab TAKE 1 TABLET(5 MG) BY MOUTH EVERY NIGHT AS NEEDED FOR SLEEP 30 tablet 0   • fluconazole 150 MG Oral Tab      • venlafaxine ER 37.5 MG Oral Capsule SR 24 Hr Take 1 capsule twice a day. 60 capsule 0   • lisinopril 10 MG Oral Tab Take 1 tablet (10 mg total) by mouth daily. 30 tablet 0   • Multiple Vitamins-Minerals (MULTIVITAMIN ADULT) Oral Tab Take by mouth.        Past Medical History:   • Anxiety   • Anxiety state, unspecified   • Breast CA (HCC)    DCIS   • Depression   • DJD (degenerative joint disease), lumbar   • Headache(784.0)   • Hiatal hernia   • Insomnia, unspecified   • Intestinal infection due to Clostridium difficile   • Multinodular goiter   • Vaginitis and vulvovaginitis, unspecified      Social History:  Social History     Socioeconomic History   • Marital status:    • Number of children: 1   Occupational History   • Occupation: haristylist     Comment: unemployed   Tobacco Use   • Smoking status: Never   • Smokeless tobacco: Never   Vaping Use   • Vaping status: Never Used   Substance and Sexual Activity   • Alcohol use: No      Alcohol/week: 0.0 standard drinks of alcohol   • Drug use: No   • Sexual activity: Yes     Partners: Male   Other Topics Concern   • Caffeine Concern Yes     Comment: 3 cups daily   • Exercise Yes     Comment: hiking 3 times a week   • Seat Belt Yes   Social History Narrative    Daughter is 10 yo.    Here with .    She does not work outside the home.     No transportation problems.    She is [facility].             Social Determinants of Health      Received from [facility], [facility]    Social Connections    Received from [facility]        REVIEW OF SYSTEMS:   GENERAL HEALTH: feels well otherwise decreased appetite  SKIN:  rash around the mouth and nose  HEENT no congestion no runny nose no sore throat  Neck no neck pain  RESPIRATORY: denies shortness of breath with exertion  CARDIOVASCULAR: denies chest pain on exertion  GI: Abdominal pain, having some heartburn  NEURO: denies headaches  Psych normal mood    EXAM:   /73 (BP Location: Right arm, Patient Position: Sitting, Cuff Size: adult)   Pulse 82   Temp 97.2 °F (36.2 °C) (Temporal)   Resp 12   Ht 5' 4\" (1.626 m)   Wt 110 lb (49.9 kg)   LMP 02/02/2023 (Exact Date)   BMI 18.88 kg/m²   GENERAL: well developed, well nourished,in no apparent distress  SKIN: no rash  HEENT: atraumatic, normocephalic,ears and throat are clear  NECK: supple,no adenopathy,  LUNGS: clear to auscultation  CARDIO: RRR without murmur  GI: good BS's,no masses, tenderness to palpation periumbilical and upper abdomen no rebound no guarding  EXTREMITIES: no  edema  Psychiatric - alert  and oriented x3, normal mood     Results for orders placed or performed in visit on 05/13/24   URINALYSIS, AUTO, W/O SCOPE   Result Value Ref Range    Glucose Urine Negative Negative mg/dL    Bilirubin Urine Negative Negative    Ketones, UA Negative Negative - Trace mg/dL    Spec Gravity 1.015 1.005 - 1.030    Blood Urine Negative  Negative    PH Urine 8.0 5.0 - 8.0    Protein Urine Negative Negative - Trace mg/dL    Urobilinogen Urine 0.2 0.2 - 1.0 mg/dL    Nitrite Urine Negative Negative    Leukocyte Esterase Urine Small (A) Negative    APPEARANCE Clear Clear    Color Urine Yellow Yellow    Multistix Lot# [id number] Numeric    Multistix Expiration Date 08/31/2024 Date     ASSESSMENT AND PLAN:     Encounter Diagnoses   Name Primary?   • Migraine without status migrainosus, not intractable, unspecified migraine type Yes   • Primary insomnia    • PRANEETH (generalized anxiety disorder)    • Abdominal pain, acute, epigastric    • Left lateral abdominal pain    • Abdominal bloating    • History of breast cancer      Continue sumatriptan refill was called in    Insomnia zolpidem refill was called in stable    Anxiety patient is doing well you Xanax as needed only.    Abdominal pain we will proceed with CT scan this is new complaint.  Having tenderness in the lateral abdomen and epigastric area.  Blood work ordered patient will have it completed    Abdominal bloating as above monitor    History of breast cancer seeing specialist monitored by them.    Orders Placed This Encounter   Procedures   • Comp Metabolic Panel (14)   • CBC With Differential With Platelet   • Lipase [E]   • URINALYSIS, AUTO, W/O SCOPE   • Urine Culture, Routine       Meds & Refills for this Visit:  Requested Prescriptions     Signed Prescriptions Disp Refills   • SUMAtriptan (IMITREX) 50 MG Oral Tab 9 tablet 2     Sig: Take 1 tablet (50 mg total) by mouth every 2 (two) hours as needed for Migraine. Use at onset; repeat once after 2 HRS-ONLY 2 IN 24 HR MAX   • zolpidem 5 MG Oral Tab 30 tablet 2     Sig: Take 1 tablet (5 mg total) by mouth nightly as needed for Sleep.   • ALPRAZolam 0.5 MG Oral Tab 30 tablet 2     Sig: Take 1 tablet (0.5 mg total) by mouth daily as needed.   • folic acid 1 MG Oral Tab 90 tablet 1     Sig: Take 1 tablet (1 mg total) by mouth daily.   PHQ-9 score  8.  PRANEETH-7 score 5.    Do CT scan.   Do Blood work.  Further recommendations pending test results.    Imaging & Consults:  CT ABDOMEN+PELVIS(CONTRAST ONLY)(CPT=74177)    The patient indicates understanding of these issues and agrees to the plan.  The patient is asked to return in 3- 4  months or sooner as needed.  The note was dictated using speech recognition software.  Accuracy and grammar in transcription may be subject to error.

## 2024-05-13 ENCOUNTER — OFFICE VISIT (OUTPATIENT)
Dept: FAMILY MEDICINE CLINIC | Facility: CLINIC | Age: 54
End: 2024-05-13
Payer: MEDICAID

## 2024-05-13 VITALS
RESPIRATION RATE: 12 BRPM | HEIGHT: 64 IN | WEIGHT: 110 LBS | BODY MASS INDEX: 18.78 KG/M2 | HEART RATE: 82 BPM | DIASTOLIC BLOOD PRESSURE: 73 MMHG | TEMPERATURE: 97 F | SYSTOLIC BLOOD PRESSURE: 120 MMHG

## 2024-05-13 DIAGNOSIS — R10.13 ABDOMINAL PAIN, ACUTE, EPIGASTRIC: ICD-10-CM

## 2024-05-13 DIAGNOSIS — F51.01 PRIMARY INSOMNIA: ICD-10-CM

## 2024-05-13 DIAGNOSIS — R14.0 ABDOMINAL BLOATING: ICD-10-CM

## 2024-05-13 DIAGNOSIS — G43.909 MIGRAINE WITHOUT STATUS MIGRAINOSUS, NOT INTRACTABLE, UNSPECIFIED MIGRAINE TYPE: Primary | ICD-10-CM

## 2024-05-13 DIAGNOSIS — F41.1 GAD (GENERALIZED ANXIETY DISORDER): ICD-10-CM

## 2024-05-13 DIAGNOSIS — R10.9 LEFT LATERAL ABDOMINAL PAIN: ICD-10-CM

## 2024-05-13 DIAGNOSIS — Z85.3 HISTORY OF BREAST CANCER: ICD-10-CM

## 2024-05-13 LAB
APPEARANCE: CLEAR
BILIRUBIN: NEGATIVE
GLUCOSE (URINE DIPSTICK): NEGATIVE MG/DL
KETONES (URINE DIPSTICK): NEGATIVE MG/DL
MULTISTIX LOT#: ABNORMAL NUMERIC
NITRITE, URINE: NEGATIVE
OCCULT BLOOD: NEGATIVE
PH, URINE: 8 (ref 4.5–8)
PROTEIN (URINE DIPSTICK): NEGATIVE MG/DL
SPECIFIC GRAVITY: 1.01 (ref 1–1.03)
URINE-COLOR: YELLOW
UROBILINOGEN,SEMI-QN: 0.2 MG/DL (ref 0–1.9)

## 2024-05-13 PROCEDURE — 87086 URINE CULTURE/COLONY COUNT: CPT | Performed by: FAMILY MEDICINE

## 2024-05-13 RX ORDER — ALPRAZOLAM 0.5 MG/1
0.5 TABLET ORAL
Qty: 30 TABLET | Refills: 2 | Status: SHIPPED | OUTPATIENT
Start: 2024-05-13

## 2024-05-13 RX ORDER — LISINOPRIL 10 MG/1
10 TABLET ORAL DAILY
Qty: 90 TABLET | Refills: 1 | Status: CANCELLED | OUTPATIENT
Start: 2024-05-13

## 2024-05-13 RX ORDER — ZOLPIDEM TARTRATE 5 MG/1
5 TABLET ORAL NIGHTLY PRN
Qty: 30 TABLET | Refills: 2 | Status: SHIPPED | OUTPATIENT
Start: 2024-05-13

## 2024-05-13 RX ORDER — ZOLPIDEM TARTRATE 5 MG/1
5 TABLET ORAL NIGHTLY PRN
Qty: 30 TABLET | Refills: 0 | Status: SHIPPED | OUTPATIENT
Start: 2024-05-13

## 2024-05-13 RX ORDER — VENLAFAXINE HYDROCHLORIDE 37.5 MG/1
CAPSULE, EXTENDED RELEASE ORAL
Qty: 60 CAPSULE | Refills: 5 | Status: CANCELLED | OUTPATIENT
Start: 2024-05-13

## 2024-05-13 RX ORDER — SUMATRIPTAN 50 MG/1
50 TABLET, FILM COATED ORAL EVERY 2 HOUR PRN
Qty: 9 TABLET | Refills: 2 | Status: SHIPPED | OUTPATIENT
Start: 2024-05-13

## 2024-05-13 RX ORDER — FOLIC ACID 1 MG/1
1 TABLET ORAL DAILY
Qty: 90 TABLET | Refills: 1 | Status: SHIPPED | OUTPATIENT
Start: 2024-05-13

## 2024-05-13 RX ORDER — DICYCLOMINE HYDROCHLORIDE 10 MG/1
10 CAPSULE ORAL
COMMUNITY
Start: 2024-05-09

## 2024-05-14 ENCOUNTER — LAB ENCOUNTER (OUTPATIENT)
Dept: LAB | Facility: HOSPITAL | Age: 54
End: 2024-05-14
Attending: FAMILY MEDICINE

## 2024-05-14 ENCOUNTER — MED REC SCAN ONLY (OUTPATIENT)
Dept: FAMILY MEDICINE CLINIC | Facility: CLINIC | Age: 54
End: 2024-05-14

## 2024-05-14 ENCOUNTER — HOSPITAL ENCOUNTER (OUTPATIENT)
Dept: CT IMAGING | Facility: HOSPITAL | Age: 54
Discharge: HOME | End: 2024-05-14
Attending: FAMILY MEDICINE

## 2024-05-14 DIAGNOSIS — R10.13 ABDOMINAL PAIN, ACUTE, EPIGASTRIC: ICD-10-CM

## 2024-05-14 DIAGNOSIS — D72.819 LEUKOPENIA, UNSPECIFIED TYPE: ICD-10-CM

## 2024-05-14 DIAGNOSIS — R10.9 LEFT LATERAL ABDOMINAL PAIN: ICD-10-CM

## 2024-05-14 DIAGNOSIS — R14.0 ABDOMINAL BLOATING: ICD-10-CM

## 2024-05-14 DIAGNOSIS — Z85.3 HISTORY OF BREAST CANCER: ICD-10-CM

## 2024-05-14 LAB — LIPASE SERPL-CCNC: 52 U/L (ref ?–300)

## 2024-05-14 PROCEDURE — 80061 LIPID PANEL: CPT | Performed by: FAMILY MEDICINE

## 2024-05-14 PROCEDURE — 85025 COMPLETE CBC W/AUTO DIFF WBC: CPT | Performed by: FAMILY MEDICINE

## 2024-05-14 PROCEDURE — 36415 COLL VENOUS BLD VENIPUNCTURE: CPT | Performed by: FAMILY MEDICINE

## 2024-05-14 PROCEDURE — 83690 ASSAY OF LIPASE: CPT

## 2024-05-14 PROCEDURE — 82306 VITAMIN D 25 HYDROXY: CPT | Performed by: FAMILY MEDICINE

## 2024-05-14 PROCEDURE — 74177 CT ABD & PELVIS W/CONTRAST: CPT | Performed by: FAMILY MEDICINE

## 2024-05-14 PROCEDURE — 80053 COMPREHEN METABOLIC PANEL: CPT | Performed by: FAMILY MEDICINE

## 2024-05-14 PROCEDURE — 84443 ASSAY THYROID STIM HORMONE: CPT | Performed by: FAMILY MEDICINE

## 2024-05-14 RX ORDER — OMEPRAZOLE 40 MG/1
40 CAPSULE, DELAYED RELEASE ORAL DAILY
Qty: 30 CAPSULE | Refills: 0 | Status: SHIPPED | OUTPATIENT
Start: 2024-05-14

## 2024-05-15 ENCOUNTER — TELEPHONE (OUTPATIENT)
Dept: FAMILY MEDICINE CLINIC | Facility: CLINIC | Age: 54
End: 2024-05-15

## 2024-05-30 ENCOUNTER — MED REC SCAN ONLY (OUTPATIENT)
Dept: FAMILY MEDICINE CLINIC | Facility: CLINIC | Age: 54
End: 2024-05-30

## 2024-06-17 ENCOUNTER — APPOINTMENT (OUTPATIENT)
Dept: GASTROENTEROLOGY | Age: 54
End: 2024-06-17
Attending: INTERNAL MEDICINE

## 2024-06-26 ENCOUNTER — TELEPHONE (OUTPATIENT)
Dept: FAMILY MEDICINE CLINIC | Facility: CLINIC | Age: 54
End: 2024-06-26

## 2024-06-26 NOTE — TELEPHONE ENCOUNTER
Dental ofc mgr calling regarding pt    Pt there for dental work, had anesthetic (lidocaine) and they report she is not feeling well    They are wondering if she had an allergic rxn to anesthetic in past?    I advised allergy list says HECTOR    She reports pt is shaky, dizzy, nauseous  Reports bp 126/76    Reports pt reports to them she has felt like this in the past a few times  They ask if this is an allergic rxn?    I advised without her being evaluated this is hard to answer. Advised to give her fluids and monitor her--if not better she needs to get looked at. The office is in [place]. Advised UC nearby if not improving. She voiced understanding

## 2024-07-01 ENCOUNTER — APPOINTMENT (OUTPATIENT)
Dept: GASTROENTEROLOGY | Age: 54
End: 2024-07-01
Attending: INTERNAL MEDICINE

## 2024-07-19 ENCOUNTER — TELEPHONE (OUTPATIENT)
Dept: FAMILY MEDICINE CLINIC | Facility: CLINIC | Age: 54
End: 2024-07-19

## 2024-07-19 NOTE — TELEPHONE ENCOUNTER
Patient is requesting soonest appointment to get a new prescription of venlafaxine ER 37.5 MG Oral Capsule SR 24 Hr

## 2024-07-29 ENCOUNTER — HOSPITAL ENCOUNTER (OUTPATIENT)
Dept: GASTROENTEROLOGY | Age: 54
Discharge: HOME | End: 2024-07-29
Attending: INTERNAL MEDICINE

## 2024-08-01 ENCOUNTER — MED REC SCAN ONLY (OUTPATIENT)
Dept: FAMILY MEDICINE CLINIC | Facility: CLINIC | Age: 54
End: 2024-08-01

## 2024-08-23 ENCOUNTER — LAB ENCOUNTER (OUTPATIENT)
Dept: LAB | Age: 54
End: 2024-08-23
Attending: FAMILY MEDICINE
Payer: MEDICAID

## 2024-08-23 DIAGNOSIS — D72.819 LEUKOPENIA, UNSPECIFIED TYPE: ICD-10-CM

## 2024-08-23 DIAGNOSIS — R51.9 DAILY HEADACHE: ICD-10-CM

## 2024-08-23 DIAGNOSIS — F41.1 GAD (GENERALIZED ANXIETY DISORDER): ICD-10-CM

## 2024-08-23 DIAGNOSIS — N92.1 MENORRHAGIA WITH IRREGULAR CYCLE: ICD-10-CM

## 2024-08-23 LAB
ALBUMIN SERPL-MCNC: 4.8 G/DL (ref 3.2–4.8)
ALBUMIN/GLOB SERPL: 1.8 {RATIO} (ref 1–2)
ALP LIVER SERPL-CCNC: 46 U/L
ALT SERPL-CCNC: 23 U/L
ANION GAP SERPL CALC-SCNC: 2 MMOL/L (ref 0–18)
AST SERPL-CCNC: 24 U/L (ref ?–34)
BASOPHILS # BLD AUTO: 0.06 X10(3) UL (ref 0–0.2)
BASOPHILS NFR BLD AUTO: 1.7 %
BILIRUB SERPL-MCNC: 0.6 MG/DL (ref 0.3–1.2)
BUN BLD-MCNC: 12 MG/DL (ref 9–23)
CALCIUM BLD-MCNC: 10.3 MG/DL (ref 8.7–10.4)
CHLORIDE SERPL-SCNC: 102 MMOL/L (ref 98–112)
CO2 SERPL-SCNC: 32 MMOL/L (ref 21–32)
CREAT BLD-MCNC: 0.75 MG/DL
CRP SERPL-MCNC: <0.4 MG/DL (ref ?–0.5)
DEPRECATED HBV CORE AB SER IA-ACNC: 17.3 NG/ML
EGFRCR SERPLBLD CKD-EPI 2021: 96 ML/MIN/1.73M2 (ref 60–?)
EOSINOPHIL # BLD AUTO: 0.06 X10(3) UL (ref 0–0.7)
EOSINOPHIL NFR BLD AUTO: 1.7 %
ERYTHROCYTE [DISTWIDTH] IN BLOOD BY AUTOMATED COUNT: 12.2 %
ERYTHROCYTE [SEDIMENTATION RATE] IN BLOOD: 2 MM/HR
FASTING STATUS PATIENT QL REPORTED: NO
GLOBULIN PLAS-MCNC: 2.6 G/DL (ref 2–3.5)
GLUCOSE BLD-MCNC: 119 MG/DL (ref 70–99)
HCT VFR BLD AUTO: 41.8 %
HGB BLD-MCNC: 14.3 G/DL
IMM GRANULOCYTES # BLD AUTO: 0.01 X10(3) UL (ref 0–1)
IMM GRANULOCYTES NFR BLD: 0.3 %
IRON SATN MFR SERPL: 54 %
IRON SERPL-MCNC: 176 UG/DL
LYMPHOCYTES # BLD AUTO: 1.83 X10(3) UL (ref 1–4)
LYMPHOCYTES NFR BLD AUTO: 53.2 %
MCH RBC QN AUTO: 30.9 PG (ref 26–34)
MCHC RBC AUTO-ENTMCNC: 34.2 G/DL (ref 31–37)
MCV RBC AUTO: 90.3 FL
MONOCYTES # BLD AUTO: 0.39 X10(3) UL (ref 0.1–1)
MONOCYTES NFR BLD AUTO: 11.3 %
NEUTROPHILS # BLD AUTO: 1.09 X10 (3) UL (ref 1.5–7.7)
NEUTROPHILS # BLD AUTO: 1.09 X10(3) UL (ref 1.5–7.7)
NEUTROPHILS NFR BLD AUTO: 31.8 %
OSMOLALITY SERPL CALC.SUM OF ELEC: 283 MOSM/KG (ref 275–295)
PLATELET # BLD AUTO: 349 10(3)UL (ref 150–450)
POTASSIUM SERPL-SCNC: 4.2 MMOL/L (ref 3.5–5.1)
PROT SERPL-MCNC: 7.4 G/DL (ref 5.7–8.2)
RBC # BLD AUTO: 4.63 X10(6)UL
SODIUM SERPL-SCNC: 136 MMOL/L (ref 136–145)
TOTAL IRON BINDING CAPACITY: 325 UG/DL (ref 250–425)
TRANSFERRIN SERPL-MCNC: 252 MG/DL (ref 250–380)
TSI SER-ACNC: 0.7 MIU/ML (ref 0.55–4.78)
WBC # BLD AUTO: 3.4 X10(3) UL (ref 4–11)

## 2024-08-23 PROCEDURE — 84443 ASSAY THYROID STIM HORMONE: CPT

## 2024-08-23 PROCEDURE — 82728 ASSAY OF FERRITIN: CPT

## 2024-08-23 PROCEDURE — 83550 IRON BINDING TEST: CPT

## 2024-08-23 PROCEDURE — 85025 COMPLETE CBC W/AUTO DIFF WBC: CPT

## 2024-08-23 PROCEDURE — 85652 RBC SED RATE AUTOMATED: CPT

## 2024-08-23 PROCEDURE — 86140 C-REACTIVE PROTEIN: CPT

## 2024-08-23 PROCEDURE — 80053 COMPREHEN METABOLIC PANEL: CPT

## 2024-08-23 PROCEDURE — 83540 ASSAY OF IRON: CPT

## 2024-08-27 DIAGNOSIS — D72.819 LEUKOPENIA, UNSPECIFIED TYPE: Primary | ICD-10-CM

## 2024-08-27 DIAGNOSIS — R79.0 ABNORMAL SERUM IRON LEVEL: ICD-10-CM

## 2024-08-27 DIAGNOSIS — R79.0 RAISED SERUM IRON: ICD-10-CM

## 2024-10-08 ENCOUNTER — TELEPHONE (OUTPATIENT)
Dept: FAMILY MEDICINE CLINIC | Facility: CLINIC | Age: 54
End: 2024-10-08

## 2024-10-08 DIAGNOSIS — Z77.120 MOLD EXPOSURE: Primary | ICD-10-CM

## 2024-10-08 NOTE — TELEPHONE ENCOUNTER
Pt is asking for appointment for testing for recent mold exposure.    Please advise if/when to schedule?

## 2024-10-09 NOTE — TELEPHONE ENCOUNTER
Spoke to the patient. Patient reports sneezing, coughing, skin irritation, and itchy eyes whenever she goes to her basement. States that there was leaking in her basement and after having it checked 4 days ago, they confirmed that molds are present.    No fever, no difficulty of breathing reported. Patient wants to know if there is a specific test she can do to confirm mold exposure.     Dr. Bryant, please advise. Thank you.

## 2024-10-09 NOTE — TELEPHONE ENCOUNTER
Yes, patient agreeable to referral for allergist. Information given for:    Krystian Kingston MD  Allergy & Immunology, Pediatric Allergy  [facility]  Phone: [phone]    Patient verbalized an understanding and will call to schedule an appointment.

## 2024-10-09 NOTE — TELEPHONE ENCOUNTER
We can have patient to see  allergist for evaluation and possible testing.  Okay to place referral for her if she is agreeable.  Thank you

## 2024-10-25 ENCOUNTER — LAB ENCOUNTER (OUTPATIENT)
Dept: LAB | Age: 54
End: 2024-10-25
Attending: FAMILY MEDICINE
Payer: MEDICAID

## 2024-10-25 ENCOUNTER — OFFICE VISIT (OUTPATIENT)
Dept: FAMILY MEDICINE CLINIC | Facility: CLINIC | Age: 54
End: 2024-10-25
Payer: MEDICAID

## 2024-10-25 VITALS
DIASTOLIC BLOOD PRESSURE: 80 MMHG | HEIGHT: 64 IN | WEIGHT: 109 LBS | BODY MASS INDEX: 18.61 KG/M2 | RESPIRATION RATE: 18 BRPM | HEART RATE: 88 BPM | SYSTOLIC BLOOD PRESSURE: 114 MMHG | TEMPERATURE: 98 F

## 2024-10-25 DIAGNOSIS — B02.9 HERPES ZOSTER WITHOUT COMPLICATION: Primary | ICD-10-CM

## 2024-10-25 DIAGNOSIS — R42 DIZZINESS: ICD-10-CM

## 2024-10-25 DIAGNOSIS — E83.19 IRON EXCESS: ICD-10-CM

## 2024-10-25 DIAGNOSIS — D72.819 LEUKOPENIA, UNSPECIFIED TYPE: ICD-10-CM

## 2024-10-25 DIAGNOSIS — R79.0 RAISED SERUM IRON: ICD-10-CM

## 2024-10-25 DIAGNOSIS — E78.00 HYPERCHOLESTEREMIA: ICD-10-CM

## 2024-10-25 DIAGNOSIS — R79.0 ABNORMAL SERUM IRON LEVEL: ICD-10-CM

## 2024-10-25 DIAGNOSIS — R51.9 DAILY HEADACHE: ICD-10-CM

## 2024-10-25 LAB
ALBUMIN SERPL-MCNC: 4.6 G/DL (ref 3.2–4.8)
ALBUMIN/GLOB SERPL: 1.6 {RATIO} (ref 1–2)
ALP LIVER SERPL-CCNC: 53 U/L
ALT SERPL-CCNC: 19 U/L
ANION GAP SERPL CALC-SCNC: 6 MMOL/L (ref 0–18)
AST SERPL-CCNC: 25 U/L (ref ?–34)
BASOPHILS # BLD AUTO: 0.05 X10(3) UL (ref 0–0.2)
BASOPHILS NFR BLD AUTO: 1.4 %
BILIRUB SERPL-MCNC: 0.9 MG/DL (ref 0.3–1.2)
BUN BLD-MCNC: 10 MG/DL (ref 9–23)
CALCIUM BLD-MCNC: 10.1 MG/DL (ref 8.7–10.4)
CHLORIDE SERPL-SCNC: 102 MMOL/L (ref 98–112)
CHOLEST SERPL-MCNC: 229 MG/DL (ref ?–200)
CO2 SERPL-SCNC: 29 MMOL/L (ref 21–32)
CREAT BLD-MCNC: 0.65 MG/DL
DEPRECATED HBV CORE AB SER IA-ACNC: 30 NG/ML
EGFRCR SERPLBLD CKD-EPI 2021: 106 ML/MIN/1.73M2 (ref 60–?)
EOSINOPHIL # BLD AUTO: 0.06 X10(3) UL (ref 0–0.7)
EOSINOPHIL NFR BLD AUTO: 1.6 %
ERYTHROCYTE [DISTWIDTH] IN BLOOD BY AUTOMATED COUNT: 13 %
FASTING PATIENT LIPID ANSWER: YES
FASTING STATUS PATIENT QL REPORTED: YES
GLOBULIN PLAS-MCNC: 2.9 G/DL (ref 2–3.5)
GLUCOSE BLD-MCNC: 92 MG/DL (ref 70–99)
HCT VFR BLD AUTO: 42.3 %
HDLC SERPL-MCNC: 110 MG/DL (ref 40–59)
HGB BLD-MCNC: 14.4 G/DL
IMM GRANULOCYTES # BLD AUTO: 0.01 X10(3) UL (ref 0–1)
IMM GRANULOCYTES NFR BLD: 0.3 %
IRON SATN MFR SERPL: 78 %
IRON SERPL-MCNC: 265 UG/DL
LDLC SERPL CALC-MCNC: 104 MG/DL (ref ?–100)
LYMPHOCYTES # BLD AUTO: 1.94 X10(3) UL (ref 1–4)
LYMPHOCYTES NFR BLD AUTO: 52.4 %
MCH RBC QN AUTO: 31 PG (ref 26–34)
MCHC RBC AUTO-ENTMCNC: 34 G/DL (ref 31–37)
MCV RBC AUTO: 91 FL
MONOCYTES # BLD AUTO: 0.46 X10(3) UL (ref 0.1–1)
MONOCYTES NFR BLD AUTO: 12.4 %
NEUTROPHILS # BLD AUTO: 1.18 X10 (3) UL (ref 1.5–7.7)
NEUTROPHILS # BLD AUTO: 1.18 X10(3) UL (ref 1.5–7.7)
NEUTROPHILS NFR BLD AUTO: 31.9 %
NONHDLC SERPL-MCNC: 119 MG/DL (ref ?–130)
OSMOLALITY SERPL CALC.SUM OF ELEC: 283 MOSM/KG (ref 275–295)
PLATELET # BLD AUTO: 337 10(3)UL (ref 150–450)
POTASSIUM SERPL-SCNC: 3.8 MMOL/L (ref 3.5–5.1)
PROT SERPL-MCNC: 7.5 G/DL (ref 5.7–8.2)
RBC # BLD AUTO: 4.65 X10(6)UL
SODIUM SERPL-SCNC: 137 MMOL/L (ref 136–145)
TOTAL IRON BINDING CAPACITY: 339 UG/DL (ref 250–425)
TRANSFERRIN SERPL-MCNC: 271 MG/DL (ref 250–380)
TRIGL SERPL-MCNC: 86 MG/DL (ref 30–149)
VLDLC SERPL CALC-MCNC: 14 MG/DL (ref 0–30)
WBC # BLD AUTO: 3.7 X10(3) UL (ref 4–11)

## 2024-10-25 PROCEDURE — 83550 IRON BINDING TEST: CPT

## 2024-10-25 PROCEDURE — 36415 COLL VENOUS BLD VENIPUNCTURE: CPT

## 2024-10-25 PROCEDURE — 82728 ASSAY OF FERRITIN: CPT

## 2024-10-25 PROCEDURE — 80061 LIPID PANEL: CPT

## 2024-10-25 PROCEDURE — 80053 COMPREHEN METABOLIC PANEL: CPT

## 2024-10-25 PROCEDURE — 99214 OFFICE O/P EST MOD 30 MIN: CPT | Performed by: FAMILY MEDICINE

## 2024-10-25 PROCEDURE — 81256 HFE GENE: CPT

## 2024-10-25 PROCEDURE — 85025 COMPLETE CBC W/AUTO DIFF WBC: CPT

## 2024-10-25 PROCEDURE — 83540 ASSAY OF IRON: CPT

## 2024-10-25 RX ORDER — VALACYCLOVIR HYDROCHLORIDE 500 MG/1
1000 TABLET, FILM COATED ORAL 3 TIMES DAILY
COMMUNITY
Start: 2024-10-12

## 2024-10-25 RX ORDER — HYDROCODONE BITARTRATE AND ACETAMINOPHEN 5; 325 MG/1; MG/1
1 TABLET ORAL
COMMUNITY
Start: 2024-10-14

## 2024-10-25 NOTE — PATIENT INSTRUCTIONS
Do blood work today.  Monitor blood pressure at home.  Further recommendation pending test results.  Follow-up with allergist as scheduled.  See neurologist for evaluation if your dizziness persists.  Keep good hydration.  Monitor symptoms.

## 2024-10-25 NOTE — PROGRESS NOTES
Brianna Logan is a 54 year old female.  Shingles, dizziness, leukopenia, hyperglycemia, high iron levels  HPI:   Patient is coming to the office for follow-up of shingles.  She went to emergency room on 10/ 11  and 10/ 14.  Pt was having headaches on the left side of the head , the ear was bothering her.  She had some rash on the forehead .  Pt was treated with both valacyclovir and rash resolved.  CT scan of the brain came back normal.  Still having some mild bilateral headaches she was evaluated at the end of August for those headaches but still occasionally have those.   patient has a dizziness which started at the same time shingles started.  It is worse with movement of the head.  On Columbia-Hallpike maneuver patient had dizziness with bilateral movement of the head and some blurry vision.  Refer patient to neurologist for evaluation.  Patient was found to have a mold in her house she has an appointment with allergist Dr. Johnson for evaluation  Leukopenia white count came back lower it is monitored.  Blood work ordered.  Patient blood work today which shows high iron levels will do hemochromatosis testing patient said that she is not taking any iron supplement awaiting hemochromatosis results to make further recommendations patient will be eventually seeing her hematologist for additional recommendations.    Current Outpatient Medications   Medication Sig Dispense Refill    valACYclovir 500 MG Oral Tab Take 2 tablets (1,000 mg total) by mouth 3 (three) times daily.      HYDROcodone-acetaminophen 5-325 MG Oral Tab Take 1 tablet by mouth.      ALPRAZolam 0.5 MG Oral Tab Take 1 tablet (0.5 mg total) by mouth daily as needed. 30 tablet 1    venlafaxine ER 75 MG Oral Capsule SR 24 Hr Take 1 capsule (75 mg total) by mouth daily. 90 capsule 0    zolpidem 5 MG Oral Tab Take 1 tablet (5 mg total) by mouth nightly as needed for Sleep. 30 tablet 1    SUMAtriptan (IMITREX) 50 MG Oral Tab Take 1 tablet (50 mg total) by mouth  every 2 (two) hours as needed for Migraine. Use at onset; repeat once after 2 HRS-ONLY 2 IN 24 HR MAX 9 tablet 2    folic acid 1 MG Oral Tab Take 1 tablet (1 mg total) by mouth daily. 90 tablet 1    lisinopril 10 MG Oral Tab Take 1 tablet (10 mg total) by mouth daily. 30 tablet 0    Multiple Vitamins-Minerals (MULTIVITAMIN ADULT) Oral Tab Take by mouth.      venlafaxine ER 37.5 MG Oral Capsule SR 24 Hr Take 1 capsule twice a day. 90 capsule 0      Past Medical History:    Anxiety    Anxiety state, unspecified    Breast CA (HCC)    DCIS    Depression    DJD (degenerative joint disease), lumbar    Headache(784.0)    Hiatal hernia    Insomnia, unspecified    Intestinal infection due to Clostridium difficile    Multinodular goiter    Vaginitis and vulvovaginitis, unspecified      Social History:  Social History     Socioeconomic History    Marital status:     Number of children: 1   Occupational History    Occupation: haristylist     Comment: unemployed   Tobacco Use    Smoking status: Never    Smokeless tobacco: Never   Vaping Use    Vaping status: Never Used   Substance and Sexual Activity    Alcohol use: No     Alcohol/week: 0.0 standard drinks of alcohol    Drug use: No    Sexual activity: Yes     Partners: Male   Other Topics Concern    Caffeine Concern Yes     Comment: 3 cups daily    Exercise Yes     Comment: hiking 3 times a week    Seat Belt Yes   Social History Narrative    Daughter is 10 yo.    Here with .    She does not work outside the home.     No transportation problems.    She is [facility].             Social Drivers of Health      Received from [facility], [facility]    Social Connections    Received from [facility]        REVIEW OF SYSTEMS:   GENERAL HEALTH: feels well otherwise  SKIN: denies any unusual skin lesions or rashes  HEENT nasal congestion no runny nose no sore throat, still having some dizziness   Neck no  neck pain   RESPIRATORY: denies shortness of breath with exertion  CARDIOVASCULAR: denies chest pain on exertion  GI: denies abdominal pain and denies heartburn  NEURO: Sinus congestion stuffiness has some minus headaches bilaterally  Psych normal mood.    EXAM:   /80 (BP Location: Left arm, Patient Position: Sitting, Cuff Size: adult)   Pulse 88   Temp 98 °F (36.7 °C) (Temporal)   Resp 18   Ht 5' 4\" (1.626 m)   Wt 109 lb (49.4 kg)   LMP 07/18/2024 (Exact Date)   BMI 18.71 kg/m²   GENERAL: well developed, well nourished,in no apparent distress, rash on the face resolved   SKIN: no rashes,no suspicious lesions  HEENT: atraumatic, normocephalic,ears -are clear, irritation of the posterior throat   NECK: supple,no adenopathy  LUNGS: clear to auscultation  CARDIO: RRR without murmur  GI: good BS's,no masses, HSM or tenderness  EXTREMITIES: no  edema  Psychiatric - alert  and oriented x3, normal mood      Results for orders placed or performed in visit on 08/23/24   C-Reactive Protein [E]    Collection Time: 08/23/24 11:36 AM   Result Value Ref Range    C-Reactive Protein <0.40 <=0.50 mg/dL   Comp Metabolic Panel (14)    Collection Time: 08/23/24 11:36 AM   Result Value Ref Range    Glucose 119 (H) 70 - 99 mg/dL    Sodium 136 136 - 145 mmol/L    Potassium 4.2 3.5 - 5.1 mmol/L    Chloride 102 98 - 112 mmol/L    CO2 32.0 21.0 - 32.0 mmol/L    Anion Gap 2 0 - 18 mmol/L    BUN 12 9 - 23 mg/dL    Creatinine 0.75 0.55 - 1.02 mg/dL    Calcium, Total 10.3 8.7 - 10.4 mg/dL    Calculated Osmolality 283 275 - 295 mOsm/kg    eGFR-Cr 96 >=60 mL/min/1.73m2    AST 24 <34 U/L    ALT 23 10 - 49 U/L    Alkaline Phosphatase 46 41 - 108 U/L    Bilirubin, Total 0.6 0.3 - 1.2 mg/dL    Total Protein 7.4 5.7 - 8.2 g/dL    Albumin 4.8 3.2 - 4.8 g/dL    Globulin  2.6 2.0 - 3.5 g/dL    A/G Ratio 1.8 1.0 - 2.0    Patient Fasting for CMP? No    CBC With Differential With Platelet    Collection Time: 08/23/24 11:36 AM   Result Value Ref  Range    WBC 3.4 (L) 4.0 - 11.0 x10(3) uL    RBC 4.63 3.80 - 5.30 x10(6)uL    HGB 14.3 12.0 - 16.0 g/dL    HCT 41.8 35.0 - 48.0 %    .0 150.0 - 450.0 10(3)uL    MCV 90.3 80.0 - 100.0 fL    MCH 30.9 26.0 - 34.0 pg    MCHC 34.2 31.0 - 37.0 g/dL    RDW 12.2 %    Neutrophil Absolute Prelim 1.09 (L) 1.50 - 7.70 x10 (3) uL    Neutrophil Absolute 1.09 (L) 1.50 - 7.70 x10(3) uL    Lymphocyte Absolute 1.83 1.00 - 4.00 x10(3) uL    Monocyte Absolute 0.39 0.10 - 1.00 x10(3) uL    Eosinophil Absolute 0.06 0.00 - 0.70 x10(3) uL    Basophil Absolute 0.06 0.00 - 0.20 x10(3) uL    Immature Granulocyte Absolute 0.01 0.00 - 1.00 x10(3) uL    Neutrophil % 31.8 %    Lymphocyte % 53.2 %    Monocyte % 11.3 %    Eosinophil % 1.7 %    Basophil % 1.7 %    Immature Granulocyte % 0.3 %   TSH W Reflex To Free T4    Collection Time: 08/23/24 11:36 AM   Result Value Ref Range    TSH 0.697 0.550 - 4.780 mIU/mL   Iron And Tibc [E]    Collection Time: 08/23/24 11:36 AM   Result Value Ref Range    Iron 176 (H) 50 - 170 ug/dL    Transferrin 252 250 - 380 mg/dL    Total Iron Binding Capacity 325 250 - 425 ug/dL    % Saturation 54 (H) 15 - 50 %   Ferritin [E]    Collection Time: 08/23/24 11:36 AM   Result Value Ref Range    Ferritin 17.3 (L) 50.0 - 306.0 ng/mL   Sed RateStef (Automated) [E]    Collection Time: 08/23/24 11:36 AM   Result Value Ref Range    Sed Rate 2 0 - 30 mm/Hr        ASSESSMENT AND PLAN:     Encounter Diagnoses   Name Primary?    Herpes zoster without complication Yes    Dizziness     Hypercholesteremia     Iron excess     Leukopenia, unspecified type     Daily headache        Orders Placed This Encounter   Procedures    Comp Metabolic Panel (14)    Lipid Panel    Hered.Hemochromatosis, Dna       Meds & Refills for this Visit:  Requested Prescriptions      No prescriptions requested or ordered in this encounter   Do blood work today.  Monitor blood pressure at home.  Further recommendation pending test  results.  Follow-up with allergist as scheduled.  See neurologist for evaluation if your dizziness persists.  Keep good hydration.  Monitor symptoms.      Blood test done today show elevated iron levels we will do the testing for hemochromatosis since patient says but she is not taking any iron supplement, ferritin level came back low.  Further recommendation pending results eventually she would have to see hematologist for evaluation of her elevated iron levels and follow-up with leukopenia.         Imaging & Consults:  NEURO - INTERNAL    The patient indicates understanding of these issues and agrees to the plan.  The patient is asked to return in end of November 2024.  The note was dictated using speech recognition software.  Accuracy and grammar in transcription may be subject to error.

## 2024-10-28 DIAGNOSIS — D72.819 LEUKOPENIA, UNSPECIFIED TYPE: Primary | ICD-10-CM

## 2024-10-28 DIAGNOSIS — R79.0 ABNORMAL BLOOD LEVEL OF IRON: ICD-10-CM

## 2024-11-05 ENCOUNTER — OFFICE VISIT (OUTPATIENT)
Dept: ALLERGY | Facility: CLINIC | Age: 54
End: 2024-11-05
Payer: MEDICAID

## 2024-11-05 ENCOUNTER — LAB ENCOUNTER (OUTPATIENT)
Dept: LAB | Age: 54
End: 2024-11-05
Attending: ALLERGY & IMMUNOLOGY
Payer: MEDICAID

## 2024-11-05 VITALS
DIASTOLIC BLOOD PRESSURE: 87 MMHG | HEIGHT: 64 IN | BODY MASS INDEX: 17.75 KG/M2 | OXYGEN SATURATION: 97 % | WEIGHT: 104 LBS | HEART RATE: 70 BPM | SYSTOLIC BLOOD PRESSURE: 127 MMHG

## 2024-11-05 DIAGNOSIS — J32.9 CHRONIC SINUSITIS, UNSPECIFIED LOCATION: ICD-10-CM

## 2024-11-05 DIAGNOSIS — G43.909 MIGRAINE WITHOUT STATUS MIGRAINOSUS, NOT INTRACTABLE, UNSPECIFIED MIGRAINE TYPE: ICD-10-CM

## 2024-11-05 DIAGNOSIS — Z77.120 MOLD GROWTH IN HOME: ICD-10-CM

## 2024-11-05 DIAGNOSIS — R09.82 PND (POST-NASAL DRIP): ICD-10-CM

## 2024-11-05 DIAGNOSIS — Z59.19 MOLD GROWTH IN HOME: ICD-10-CM

## 2024-11-05 DIAGNOSIS — J32.9 CHRONIC SINUSITIS, UNSPECIFIED LOCATION: Primary | ICD-10-CM

## 2024-11-05 DIAGNOSIS — Z86.19 HISTORY OF SHINGLES: ICD-10-CM

## 2024-11-05 PROCEDURE — 36415 COLL VENOUS BLD VENIPUNCTURE: CPT

## 2024-11-05 PROCEDURE — 86003 ALLG SPEC IGE CRUDE XTRC EA: CPT

## 2024-11-05 PROCEDURE — 99204 OFFICE O/P NEW MOD 45 MIN: CPT | Performed by: ALLERGY & IMMUNOLOGY

## 2024-11-05 SDOH — ECONOMIC STABILITY - HOUSING INSECURITY: OTHER INADEQUATE HOUSING: Z59.19

## 2024-11-05 NOTE — PATIENT INSTRUCTIONS
We we will send patient for blood test-RAST testing-to molds growing in her home per recent mold inspection.    Patient will follow-up in our [facility] allergy clinic in [facility] in 3 to 4 weeks.    Patient might consider consultation with an infectious disease specialist for evaluation and treatment of possible chronic, recurrent shingles.

## 2024-11-05 NOTE — H&P
Brianna Logan is a 54 year old female.    HPI:     Chief Complaint   Patient presents with    Allergies     Patient presents for possible mold allergy-patient states was referred by PCP for testing due to,dry skin, shingles, sneezing, nasal congestion,denies fever, denies chest tightness, cough, wheeze, denies shortness of breath.     The patient is a 54-year-old female referred by Dr. Bryant for new consult for evaluation of possible mold allergy.    Patient has a history of chronic rhinosinusitis, with postnasal drainage, with consequent throat clearing and cough for many years.  Patient also has a history of chronic recurrent migraine headaches, with a possible mold allergy contribution.    Patient also has a recent history of shingles, which is extremely unlikely related to mold allergy.    Meanwhile, patient had a recent mold inspection of her home, which revealed growth of Aspergillus, Cladosporium, penicillium, and Mucor mold species in her kitchen.  Reportedly, patient has additional mold inspection results from other rooms coming in the near future.        HISTORY:  Past Medical History:    Anxiety    Anxiety state, unspecified    Breast CA (HCC)    DCIS    Depression    DJD (degenerative joint disease), lumbar    Headache(784.0)    Hiatal hernia    Insomnia, unspecified    Intestinal infection due to Clostridium difficile    Multinodular goiter    Vaginitis and vulvovaginitis, unspecified      Past Surgical History:   Procedure Laterality Date    Appendectomy  1989    Colonoscopy  6/2016    D & c  2009    SAB    Egd  6/2016    Lumpectomy right  07/2015    DCIS      Family History   Problem Relation Age of Onset    Other (acute venous thrombosis of the deep vessels of the lower extremity) Mother 63    Cancer Maternal Grandfather         rectal cancer    Other (Other) Father 55        choked on food    Breast Cancer Self 45      Social History:   Social History     Socioeconomic History    Marital  status:     Number of children: 1   Occupational History    Occupation: haristylist     Comment: unemployed   Tobacco Use    Smoking status: Never     Passive exposure: Never    Smokeless tobacco: Never   Vaping Use    Vaping status: Never Used   Substance and Sexual Activity    Alcohol use: No     Alcohol/week: 0.0 standard drinks of alcohol    Drug use: No    Sexual activity: Yes     Partners: Male   Other Topics Concern    Caffeine Concern Yes     Comment: 3 cups daily    Exercise Yes     Comment: hiking 3 times a week    Seat Belt Yes   Social History Narrative    Daughter is 10 yo.    Here with .    She does not work outside the home.     No transportation problems.    She is [facility].             Social Drivers of Health      [facility]    Social Connections    [facility]        Medications (Active prior to today's visit):  Current Outpatient Medications   Medication Sig Dispense Refill    HYDROcodone-acetaminophen 5-325 MG Oral Tab Take 1 tablet by mouth.      ALPRAZolam 0.5 MG Oral Tab Take 1 tablet (0.5 mg total) by mouth daily as needed. 30 tablet 1    venlafaxine ER 75 MG Oral Capsule SR 24 Hr Take 1 capsule (75 mg total) by mouth daily. 90 capsule 0    zolpidem 5 MG Oral Tab Take 1 tablet (5 mg total) by mouth nightly as needed for Sleep. 30 tablet 1    SUMAtriptan (IMITREX) 50 MG Oral Tab Take 1 tablet (50 mg total) by mouth every 2 (two) hours as needed for Migraine. Use at onset; repeat once after 2 HRS-ONLY 2 IN 24 HR MAX 9 tablet 2    folic acid 1 MG Oral Tab Take 1 tablet (1 mg total) by mouth daily. 90 tablet 1    lisinopril 10 MG Oral Tab Take 1 tablet (10 mg total) by mouth daily. 30 tablet 0    Multiple Vitamins-Minerals (MULTIVITAMIN ADULT) Oral Tab Take by mouth.      valACYclovir 500 MG Oral Tab Take 2 tablets (1,000 mg total) by mouth 3 (three) times daily. (Patient  not taking: Reported on 11/5/2024)      venlafaxine ER 37.5 MG Oral Capsule SR 24 Hr Take 1 capsule twice a day. (Patient not taking: Reported on 11/5/2024) 90 capsule 0       Allergies:  Allergies[1]      ROS:   Review of Systems   Constitutional:  Negative for activity change, appetite change, chills, diaphoresis, fatigue, fever and unexpected weight change.   HENT:  Positive for congestion, postnasal drip and sinus pressure. Negative for ear discharge, ear pain, facial swelling, hearing loss, mouth sores, rhinorrhea, sinus pain, sneezing, sore throat, tinnitus, trouble swallowing and voice change.    Eyes:  Negative for pain, discharge, redness and itching.   Respiratory:  Negative for apnea, cough, choking, chest tightness, shortness of breath, wheezing and stridor.    Cardiovascular:  Negative for chest pain and palpitations.   Gastrointestinal:  Negative for abdominal distention, abdominal pain, constipation, diarrhea, nausea and vomiting.   Endocrine: Negative for cold intolerance and heat intolerance.   Musculoskeletal:  Negative for arthralgias, joint swelling and myalgias.   Skin:  Negative for pallor and rash.        Recent shingles   Allergic/Immunologic: Positive for environmental allergies. Negative for food allergies and immunocompromised state.   Neurological:  Positive for headaches.   Psychiatric/Behavioral:  Negative for behavioral problems and sleep disturbance.           PHYSICAL EXAM:   Physical Exam  Constitutional:       Appearance: She is normal weight.   HENT:      Head: No right periorbital erythema or left periorbital erythema.      Right Ear: Tympanic membrane normal. There is no impacted cerumen.      Left Ear: Tympanic membrane normal. There is no impacted cerumen.      Nose: No congestion or rhinorrhea.      Mouth/Throat:      Pharynx: No oropharyngeal exudate or posterior oropharyngeal erythema.   Eyes:      General: Lids are normal. No allergic shiner.        Right eye: No  discharge.         Left eye: No discharge.      Conjunctiva/sclera: Conjunctivae normal.      Right eye: Right conjunctiva is not injected. No exudate.     Left eye: Left conjunctiva is not injected. No exudate.  Cardiovascular:      Rate and Rhythm: Normal rate and regular rhythm.      Heart sounds: Normal heart sounds.   Pulmonary:      Effort: No tachypnea, prolonged expiration or respiratory distress.      Breath sounds: No stridor or decreased air movement. No decreased breath sounds, wheezing, rhonchi or rales.   Skin:     Coloration: Skin is not cyanotic or pale.      Findings: No acne, ecchymosis, erythema, petechiae or rash. Rash is not macular, nodular, papular, purpuric, pustular, scaling, urticarial or vesicular.           ASSESSMENT   Assessment   Encounter Diagnoses   Name Primary?    Chronic sinusitis, unspecified location Yes    PND (post-nasal drip)     History of shingles     Mold growth in home     Migraine without status migrainosus, not intractable, unspecified migraine type        PLAN     We will send patient for blood test-RAST testing-for possible mold allergy.  Patient has a report from mold inspection which is identified for species of mold growing in her kitchen.    Patient will follow-up in our [place] allergy clinic in 3 to 4 weeks.    In addition, patient might consider consultation with an infectious disease specialist for evaluation/treatment of possible chronic recurrent shingles.       Orders This Visit:  Orders Placed This Encounter   Procedures    Aspergillus Fumigatus    Cladosporium Herbarum    Penicillium Notatum    Mucor, IGE       Meds This Visit:  Requested Prescriptions      No prescriptions requested or ordered in this encounter       Imaging & Referrals:  None     11/5/2024  Nabeel Johnson MD      If medication samples were provided today, they were provided solely for patient education and training related to self administration of these medications.  Teaching,  instruction and sample was provided to the patient by myself.  Teaching included  a review of potential adverse side effects as well as potential efficacy.  Patient's questions were answered in regards to medication administration and dosing and potential side effects. Teaching was provided via the teach back method       [1] No Known Allergies

## 2024-11-07 LAB
A FUMIGATUS IGE QN: <0.1 KUA/L (ref ?–0.1)
C HERBARUM IGE QN: <0.1 KUA/L (ref ?–0.1)
M RACEMOSUS IGE QN: <0.1 KUA/L (ref ?–0.1)
P NOTATUM IGE QN: <0.1 KUA/L (ref ?–0.1)

## 2024-11-25 ENCOUNTER — OFFICE VISIT (OUTPATIENT)
Dept: HEMATOLOGY/ONCOLOGY | Facility: HOSPITAL | Age: 54
End: 2024-11-25
Attending: INTERNAL MEDICINE
Payer: MEDICAID

## 2024-11-25 VITALS
HEART RATE: 85 BPM | OXYGEN SATURATION: 98 % | SYSTOLIC BLOOD PRESSURE: 139 MMHG | WEIGHT: 115.69 LBS | DIASTOLIC BLOOD PRESSURE: 94 MMHG | RESPIRATION RATE: 16 BRPM | BODY MASS INDEX: 20 KG/M2

## 2024-11-25 DIAGNOSIS — D70.9 CHRONIC NEUTROPENIA (HCC): Primary | ICD-10-CM

## 2024-11-25 PROCEDURE — 99214 OFFICE O/P EST MOD 30 MIN: CPT | Performed by: INTERNAL MEDICINE

## 2024-11-25 NOTE — PROGRESS NOTES
Patient here for 2 year f/u for anemia. Patient had labs completed at end of October. Patient's states she has fatigue and dizziness. Patient denies dyspnea or bleeding issues.     Education Record    Learner:  Patient    Disease / Diagnosis: anemia     Barriers / Limitations:  None   Comments:    Method:  Discussion   Comments:    General Topics:  Medication, Side effects and symptom management, and Plan of care reviewed   Comments:    Outcome:  Shows understanding   Comments:

## 2024-11-25 NOTE — PROGRESS NOTES
Cancer Center Progress Note    Problem List:      Patient Active Problem List   Diagnosis    Dysmenorrhea    Chest pain, unspecified    Insomnia, unspecified    Acute pharyngitis    Lumbago    Headache(784.0)    Leukocytopenia    Umbilical hernia without mention of obstruction or gangrene    Otalgia, unspecified    Other acute reactions to stress    Nontoxic uninodular goiter    Other and unspecified ovarian cyst    Dyspepsia and other specified disorders of function of stomach    Leiomyoma of uterus, unspecified    Unspecified disorder of gallbladder    Displacement of lumbar intervertebral disc without myelopathy    Ductal carcinoma in situ (DCIS) of right breast    Iron deficiency anemia due to chronic blood loss    Anxiety    Pneumonia of both lungs due to infectious organism    Hiatal hernia    Chronic neutropenia (HCC)    Normocytic anemia    Neutropenia (HCC)    Folic acid deficiency    Yeast vaginitis    Pleuritic chest pain    Elevated blood pressure reading in office without diagnosis of hypertension    Multinodular goiter    Vaginal discharge    Excessive sweating    Daily headache    Ear pain, bilateral    Acute vaginitis    Elevated cancer antigen 125 (CA-125)    Left lower quadrant pain    Metrorrhagia    Uterovaginal prolapse, incomplete    Left ovarian cyst    Intramural leiomyoma of uterus    Secondary dysmenorrhea       Interim History:    Brianna Logan presents today for evaluation and management of a diagnosis of iron deficiency and breast cancer.    She has some intermittent headache.  This is diffuse on her head. She has no fever or sweats. She has no dyspnea or cough. She had a CBC on 10/25/2024. The WBC was 3.7 with ANC 1180. The HGB was 14.4 and PLT 337k. The ferritin was 30. She had a ferritin on 8/23/2024 that was 17.3. She has had less frequent menstrual periods. She has no change in her diet. She has no abdominal pain.    The patient has a history of DCIS s/p lumpectomy in 7/2015. She  has ER positive disease but she decided against endocrine therapy.     She did received IV Infed 1000 mg on 12/3/2021. She had a ferritin less than 10 in 11/2020. She had a ferritin of 89 on 6/5 and 79 on 6/16/2021. She has had colonoscopy.    Review of Systems:   Constitutional: Negative for anorexia, fevers, chills, night sweats and weight loss.  Psychiatric: The patient's mood was calm and appropriate for this visit.  The pertinent positives and negatives were described. All other systems were negative.    PMH/PSH:  Past Medical History:    Anxiety    Anxiety state, unspecified    Breast CA (HCC)    DCIS    Depression    DJD (degenerative joint disease), lumbar    Headache(784.0)    Hiatal hernia    Insomnia, unspecified    Intestinal infection due to Clostridium difficile    Multinodular goiter    Vaginitis and vulvovaginitis, unspecified       Past Surgical History:   Procedure Laterality Date    Appendectomy  1989    Colonoscopy  6/2016    D & c  2009    SAB    Egd  6/2016    Lumpectomy right  07/2015    DCIS       Family History Reviewed:  Family History   Problem Relation Age of Onset    Other (acute venous thrombosis of the deep vessels of the lower extremity) Mother 63    Cancer Maternal Grandfather         rectal cancer    Other (Other) Father 55        choked on food    Breast Cancer Self 45       Allergies:     No Known Allergies    Medications:   valACYclovir 500 MG Oral Tab Take 2 tablets (1,000 mg total) by mouth 3 (three) times daily.      HYDROcodone-acetaminophen 5-325 MG Oral Tab Take 1 tablet by mouth.      ALPRAZolam 0.5 MG Oral Tab Take 1 tablet (0.5 mg total) by mouth daily as needed. 30 tablet 1    venlafaxine ER 75 MG Oral Capsule SR 24 Hr Take 1 capsule (75 mg total) by mouth daily. 90 capsule 0    zolpidem 5 MG Oral Tab Take 1 tablet (5 mg total) by mouth nightly as needed for Sleep. 30 tablet 1    venlafaxine ER 37.5 MG Oral Capsule SR 24 Hr Take 1 capsule twice a day. 90 capsule 0     SUMAtriptan (IMITREX) 50 MG Oral Tab Take 1 tablet (50 mg total) by mouth every 2 (two) hours as needed for Migraine. Use at onset; repeat once after 2 HRS-ONLY 2 IN 24 HR MAX 9 tablet 2    folic acid 1 MG Oral Tab Take 1 tablet (1 mg total) by mouth daily. 90 tablet 1    lisinopril 10 MG Oral Tab Take 1 tablet (10 mg total) by mouth daily. 30 tablet 0    Multiple Vitamins-Minerals (MULTIVITAMIN ADULT) Oral Tab Take by mouth.         Vital Signs:      Height: --  Weight: 52.5 kg (115 lb 11.2 oz) (11/25 1122)  BSA (Calculated - sq m): --  Pulse: 85 (11/25 1122)  BP: 139/94 (11/25 1122)  Temp: --  Do Not Use - Resp Rate: --  SpO2: 98 % (11/25 1122)      Performance Status:  ECOG 0: Fully active, able to carry on all pre-disease performance without restriction     Physical Examination:    Constitutional: Patient is alert and not in acute distress.  Respiratory: Clear to auscultation and percussion. No rales.  No wheezes.  Cardiovascular: Regular rate and rhythm. No murmurs.  Gastrointestinal: Soft, non tender with good bowel sounds.  Musculoskeletal: No edema. No calf tenderness.  Lymphatics: There is no palpable lymphadenopathy throughout in the cervical, supraclavicular, or axillary regions.  Psychiatric: The patient's mood is calm and appropriate for this visit.      Labs reviewed at this visit:     Lab Results   Component Value Date    WBC 3.7 (L) 10/25/2024    RBC 4.65 10/25/2024    HGB 14.4 10/25/2024    HCT 42.3 10/25/2024    MCV 91.0 10/25/2024    MCH 31.0 10/25/2024    MCHC 34.0 10/25/2024    RDW 13.0 10/25/2024    .0 10/25/2024    MPV 10.1 (H) 08/19/2011     Lab Results   Component Value Date     10/25/2024    K 3.8 10/25/2024     10/25/2024    CO2 29.0 10/25/2024    BUN 10 10/25/2024    CREATSERUM 0.65 10/25/2024    GLU 92 10/25/2024    CA 10.1 10/25/2024    ALKPHO 53 10/25/2024    ALT 19 10/25/2024    AST 25 10/25/2024    BILT 0.9 10/25/2024    ALB 4.6 10/25/2024    TP 7.5 10/25/2024          Component  Ref Range & Units 10/25/24 1149   Ferritin  50 - 306 ng/mL 30 Low             Component  Ref Range & Units 10/25/24 1149   Iron  50 - 170 ug/dL 265 High    Transferrin  250 - 380 mg/dL 271   Total Iron Binding Capacity  250 - 425 ug/dL 339   % Saturation  15 - 50 % 78 High             Radiologic imaging reviewed at this visit:    CT abd/pelvis on 5/14/2024:  FINDINGS:    LIVER:  No enlargement, atrophy, abnormal density, or significant focal lesion.  7 mm well-marginated nonenhancing low-attenuation lesion in segment 7 most consistent with incidental benign cyst.  BILIARY:  No visible dilatation or calcification.    PANCREAS:  No lesion, fluid collection, ductal dilatation, or atrophy.    SPLEEN:  No enlargement or focal lesion.    KIDNEYS:  10 x 7 mm fat attenuation lesion in posterior midpole cortex of left kidney consistent with angiomyolipoma.  No calculus or hydronephrosis.  ADRENALS:  No mass or enlargement.    AORTA/VASCULAR:  No aneurysm or dissection.    RETROPERITONEUM:  No mass or adenopathy.    BOWEL/MESENTERY:  Moderate colonic stool burden.  No bowel distention or bowel wall thickening.  ABDOMINAL WALL:  No mass or hernia.    URINARY BLADDER:  No visible focal wall thickening, lesion, or calculus.    PELVIC NODES:  No adenopathy.    PELVIC ORGANS:  Bilateral posterior body/fundal uterine fibroids each measuring up to 3.3 cm.  BONES:  Degenerative changes of spine.  LUNG BASES:  No visible pulmonary or pleural disease.       Impression   CONCLUSION:    1. No acute abdominal or pelvic pathology.  2. Moderate stool throughout the colon.  3. Fibroid uterus.  4. 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  This does not require follow-up based upon small size.        Assessment/Plan:        Iron deficiency:  Amy-menopausal menorrhagia     The patient's iron studies continue to show low iron stores rather than increased iron stores. The Ferritin is less than 50. She does not have iron overload.  The iron saturation is unreliable in the setting of a low ferritin. The serum iron can fluctuate daily based on dietary iron intake.    I would recommend continue routine follow up with CBC and ferritin based on her symptoms. If the ferritin decrease to less than 20 then we could consider IV iron infusion.      Transient neutropenia     This has been stable for many years. No further workup is necessary. Yearly CBC is reasonable.    Ductal carcinoma of the right breast with negative margins after a lumpectomy:     The ER and FL were both 100% positive by IHC. She had lumpectomy and radiation therapy in 2015. She has ELKIN.      Grayson Gil MD

## 2024-11-26 ENCOUNTER — TELEPHONE (OUTPATIENT)
Dept: FAMILY MEDICINE CLINIC | Facility: CLINIC | Age: 54
End: 2024-11-26

## 2024-11-26 NOTE — TELEPHONE ENCOUNTER
Currently no openings this week.  We would have to see where of cancellation for December.  Patient can try to check herself online if we have cancellations.  I am aware of any cancellation we will call her.  Thank you

## 2024-11-26 NOTE — TELEPHONE ENCOUNTER
Patient unable to make appointment today. States she is dizzy and cannot drive. Requesting another appointment

## 2024-11-26 NOTE — TELEPHONE ENCOUNTER
Mammogram gets ordered from OB/Gyne.     Confirmed with patient that mammogram is ordered from her OB/Gyne. She verbalized an understanding.

## 2024-12-03 ENCOUNTER — PATIENT MESSAGE (OUTPATIENT)
Dept: ALLERGY | Facility: CLINIC | Age: 54
End: 2024-12-03

## 2024-12-03 ENCOUNTER — OFFICE VISIT (OUTPATIENT)
Dept: ALLERGY | Facility: CLINIC | Age: 54
End: 2024-12-03

## 2024-12-03 VITALS
HEART RATE: 80 BPM | RESPIRATION RATE: 20 BRPM | WEIGHT: 112 LBS | TEMPERATURE: 97 F | SYSTOLIC BLOOD PRESSURE: 129 MMHG | BODY MASS INDEX: 19 KG/M2 | OXYGEN SATURATION: 100 % | DIASTOLIC BLOOD PRESSURE: 87 MMHG

## 2024-12-03 DIAGNOSIS — J32.9 CHRONIC SINUSITIS, UNSPECIFIED LOCATION: Primary | ICD-10-CM

## 2024-12-03 DIAGNOSIS — J30.89 NON-SEASONAL ALLERGIC RHINITIS DUE TO FUNGAL SPORES: ICD-10-CM

## 2024-12-03 DIAGNOSIS — R09.82 PND (POST-NASAL DRIP): ICD-10-CM

## 2024-12-03 DIAGNOSIS — R05.3 CHRONIC COUGH: ICD-10-CM

## 2024-12-03 DIAGNOSIS — D70.9 NEUTROPENIA, UNSPECIFIED TYPE (HCC): ICD-10-CM

## 2024-12-03 PROCEDURE — 99213 OFFICE O/P EST LOW 20 MIN: CPT | Performed by: ALLERGY & IMMUNOLOGY

## 2024-12-03 NOTE — PATIENT INSTRUCTIONS
Today, we discussed the results of blood/RAST testing to 4 specific mold species found growing in patient's home per home mold inspection.    Patient's RAST test were completely negative for allergy to Aspergillus, Cladosporium, penicillium, and Mucor mold species.    If patient discovers the results of further mold testing revealing different mold species than above, she can call our office for order for blood test-RAST testing to check for allergy for those mold species as well.    Patient would then follow-up in our [place] allergy clinic in [facility] 2 weeks after blood drawn above.

## 2024-12-03 NOTE — PROGRESS NOTES
Brianna Logan is a 54 year old female.    HPI:     Chief Complaint   Patient presents with    Allergies     Patient presents for 4 week follow-up.  She presents to discuss RAST test resutls.      Patient is a 54-year-old female for follow-up.  Today we discussed the results of patient's RAST testing to 4 specific molds there were identified in a home inspection.    The patient presented with chronic rhinosinusitis with postnasal drainage, occasional throat clearing and cough.  Patient was concerned about a possible allergy to the mold found growing in her home.    Today we discussed the RAST testing to Aspergillus, Cladosporium, penicillium, and Mucor mold species which were all completely negative for allergy.        HISTORY:  Past Medical History:    Anxiety    Anxiety state, unspecified    Breast CA (HCC)    DCIS    Depression    DJD (degenerative joint disease), lumbar    Headache(784.0)    Hiatal hernia    Insomnia, unspecified    Intestinal infection due to Clostridium difficile    Multinodular goiter    Vaginitis and vulvovaginitis, unspecified      Past Surgical History:   Procedure Laterality Date    Appendectomy  1989    Colonoscopy  6/2016    D & c  2009    SAB    Egd  6/2016    Lumpectomy right  07/2015    DCIS      Family History   Problem Relation Age of Onset    Other (acute venous thrombosis of the deep vessels of the lower extremity) Mother 63    Cancer Maternal Grandfather         rectal cancer    Other (Other) Father 55        choked on food    Breast Cancer Self 45      Social History:   Social History     Socioeconomic History    Marital status:     Number of children: 1   Occupational History    Occupation: haristylist     Comment: unemployed   Tobacco Use    Smoking status: Never     Passive exposure: Current    Smokeless tobacco: Never   Vaping Use    Vaping status: Never Used   Substance and Sexual Activity    Alcohol use: No     Alcohol/week: 0.0 standard drinks of alcohol    Drug  use: No    Sexual activity: Yes     Partners: Male   Other Topics Concern    Caffeine Concern Yes     Comment: 3 cups daily    Exercise Yes     Comment: hiking 3 times a week    Seat Belt Yes   Social History Narrative    Daughter is 10 yo.    Here with .    She does not work outside the home.     No transportation problems.    She is [facility].             Social Drivers of Health      [facility]    Social Connections    [facility]        Medications (Active prior to today's visit):  Current Outpatient Medications   Medication Sig Dispense Refill    valACYclovir 500 MG Oral Tab Take 2 tablets (1,000 mg total) by mouth 3 (three) times daily.      HYDROcodone-acetaminophen 5-325 MG Oral Tab Take 1 tablet by mouth.      ALPRAZolam 0.5 MG Oral Tab Take 1 tablet (0.5 mg total) by mouth daily as needed. 30 tablet 1    venlafaxine ER 75 MG Oral Capsule SR 24 Hr Take 1 capsule (75 mg total) by mouth daily. 90 capsule 0    zolpidem 5 MG Oral Tab Take 1 tablet (5 mg total) by mouth nightly as needed for Sleep. 30 tablet 1    SUMAtriptan (IMITREX) 50 MG Oral Tab Take 1 tablet (50 mg total) by mouth every 2 (two) hours as needed for Migraine. Use at onset; repeat once after 2 HRS-ONLY 2 IN 24 HR MAX 9 tablet 2    folic acid 1 MG Oral Tab Take 1 tablet (1 mg total) by mouth daily. 90 tablet 1    lisinopril 10 MG Oral Tab Take 1 tablet (10 mg total) by mouth daily. 30 tablet 0    Multiple Vitamins-Minerals (MULTIVITAMIN ADULT) Oral Tab Take by mouth.      venlafaxine ER 37.5 MG Oral Capsule SR 24 Hr Take 1 capsule twice a day. (Patient not taking: Reported on 12/3/2024) 90 capsule 0       Allergies:  Allergies[1]      ROS:   Review of Systems   Constitutional:  Negative for activity change, appetite change, chills, diaphoresis, fatigue, fever and unexpected weight change.   HENT:  Positive for congestion,  postnasal drip and sinus pressure. Negative for ear discharge, ear pain, facial swelling, hearing loss, mouth sores, rhinorrhea, sinus pain, sneezing, sore throat, tinnitus, trouble swallowing and voice change.    Eyes:  Negative for pain, discharge, redness and itching.   Respiratory:  Positive for cough. Negative for apnea, choking, chest tightness, shortness of breath, wheezing and stridor.    Cardiovascular:  Negative for chest pain and palpitations.   Gastrointestinal:  Negative for abdominal distention, abdominal pain, constipation, diarrhea, nausea and vomiting.   Endocrine: Negative for cold intolerance and heat intolerance.   Musculoskeletal:  Negative for arthralgias, joint swelling and myalgias.   Skin:  Negative for pallor and rash.   Allergic/Immunologic: Negative for environmental allergies, food allergies and immunocompromised state.   Neurological:  Negative for headaches.   Psychiatric/Behavioral:  Negative for behavioral problems and sleep disturbance.           PHYSICAL EXAM:   Physical Exam  Constitutional:       Appearance: She is normal weight.   HENT:      Head: No right periorbital erythema or left periorbital erythema.      Right Ear: Tympanic membrane normal. There is no impacted cerumen.      Left Ear: Tympanic membrane normal. There is no impacted cerumen.      Nose: No congestion or rhinorrhea.      Mouth/Throat:      Pharynx: No oropharyngeal exudate or posterior oropharyngeal erythema.   Eyes:      General: Lids are normal. No allergic shiner.        Right eye: No discharge.         Left eye: No discharge.      Conjunctiva/sclera: Conjunctivae normal.      Right eye: Right conjunctiva is not injected. No exudate.     Left eye: Left conjunctiva is not injected. No exudate.  Cardiovascular:      Rate and Rhythm: Normal rate and regular rhythm.      Heart sounds: Normal heart sounds.   Pulmonary:      Effort: No tachypnea, prolonged expiration or respiratory distress.      Breath sounds:  No stridor or decreased air movement. No decreased breath sounds, wheezing, rhonchi or rales.   Skin:     Coloration: Skin is not cyanotic or pale.      Findings: No acne, ecchymosis, erythema, petechiae or rash. Rash is not macular, nodular, papular, purpuric, pustular, scaling, urticarial or vesicular.           ASSESSMENT   Assessment   Encounter Diagnoses   Name Primary?    Chronic sinusitis, unspecified location Yes    PND (post-nasal drip)     Chronic cough     Neutropenia, unspecified type (HCC)        PLAN     Today, we discussed the results of RAST testing to for specific mold species that were found growing in patient's home per home inspection.    Patient's RAST testing were completely negative to Aspergillus, Cladosporium, penicillium, and Mucor mold spores.       Orders This Visit:  No orders of the defined types were placed in this encounter.      Meds This Visit:  Requested Prescriptions      No prescriptions requested or ordered in this encounter       Imaging & Referrals:  None     12/3/2024  Nabeel Johnson MD      If medication samples were provided today, they were provided solely for patient education and training related to self administration of these medications.  Teaching, instruction and sample was provided to the patient by myself.  Teaching included  a review of potential adverse side effects as well as potential efficacy.  Patient's questions were answered in regards to medication administration and dosing and potential side effects. Teaching was provided via the teach back method       [1] No Known Allergies

## 2024-12-05 ENCOUNTER — TELEPHONE (OUTPATIENT)
Dept: ALLERGY | Facility: CLINIC | Age: 54
End: 2024-12-05

## 2024-12-05 NOTE — TELEPHONE ENCOUNTER
Message left on patient's voicemail that information regarding lab tests ordered will be sent to her via [facility].     Patient asked to call the Allergy Office back at at [phone] with any additional concerns or questions.       See [facility] message sent to patient.     Dr. Alex Campa reviewed the [facility] Report.       He placed an order for lab test for 2 types of additional molds not tested previously.      We do not have the ability to test for the any additional molds on the report.     You have been previously tested to some of the molds on the report.      Please present to any of the [facility] locations for blood draw when it is convenient for you.      Please respond via [facility] or call the Allergy Office at [phone] with any additional questions or concerns.     Claudia Marshall RN Regards, Heather P. RN

## 2024-12-11 ENCOUNTER — LAB ENCOUNTER (OUTPATIENT)
Dept: LAB | Age: 54
End: 2024-12-11
Attending: ALLERGY & IMMUNOLOGY
Payer: MEDICAID

## 2024-12-11 DIAGNOSIS — R09.82 PND (POST-NASAL DRIP): ICD-10-CM

## 2024-12-11 DIAGNOSIS — J30.89 NON-SEASONAL ALLERGIC RHINITIS DUE TO FUNGAL SPORES: ICD-10-CM

## 2024-12-11 DIAGNOSIS — R05.3 CHRONIC COUGH: ICD-10-CM

## 2024-12-11 DIAGNOSIS — J32.9 CHRONIC SINUSITIS, UNSPECIFIED LOCATION: ICD-10-CM

## 2024-12-11 PROCEDURE — 86003 ALLG SPEC IGE CRUDE XTRC EA: CPT

## 2024-12-11 PROCEDURE — 36415 COLL VENOUS BLD VENIPUNCTURE: CPT

## 2024-12-13 LAB
A ALTERNATA IGE QN: <0.1 KUA/L (ref ?–0.1)
E PURPURASCENS IGE QN: <0.1 KUA/L (ref ?–0.1)

## 2024-12-23 ENCOUNTER — TELEPHONE (OUTPATIENT)
Dept: ALLERGY | Facility: CLINIC | Age: 54
End: 2024-12-23

## 2024-12-23 NOTE — TELEPHONE ENCOUNTER
Pt contacted, confirmed name, and . Pt verbalizes understanding, and denies further questions.

## 2024-12-23 NOTE — TELEPHONE ENCOUNTER
----- Message from Nabeel Johnson sent at 12/20/2024  9:56 AM CST -----  Okay for nursing to tell / phone patient that she does not have allergy to any of the molds tested  HENNA

## 2024-12-23 NOTE — TELEPHONE ENCOUNTER
----- Message from Nabeel Johnson sent at 12/13/2024  1:50 PM CST -----  Will discuss results during follow up appt  HENNA

## 2024-12-27 DIAGNOSIS — F41.1 GAD (GENERALIZED ANXIETY DISORDER): ICD-10-CM

## 2024-12-31 RX ORDER — VENLAFAXINE HYDROCHLORIDE 75 MG/1
75 CAPSULE, EXTENDED RELEASE ORAL DAILY
Qty: 30 CAPSULE | Refills: 0 | Status: SHIPPED | OUTPATIENT
Start: 2024-12-31 | End: 2025-01-31

## 2024-12-31 NOTE — TELEPHONE ENCOUNTER
Did not pass protocol    Last office visit 8/23    Last refill was:     venlafaxine ER 75 MG Oral Capsule SR 24 Hr 90 capsule 0 8/23/2024 --    Sig - Route: Take 1 capsule (75 mg total) by mouth daily. - Oral    Sent to pharmacy as: Venlafaxine HCl ER 75 MG Oral Capsule Extended Release 24 Hour (Effexor-XR)    E-Prescribing Status: Receipt confirmed by pharmacy (8/23/2024 11:25 AM CDT)      Next appointment: none    Please sign pended medication if appropriate

## 2025-01-16 ENCOUNTER — HOSPITAL ENCOUNTER (OUTPATIENT)
Dept: MAMMOGRAPHY | Facility: HOSPITAL | Age: 55
Discharge: HOME | End: 2025-01-16
Attending: SPECIALIST
Payer: MEDICAID

## 2025-01-16 ENCOUNTER — TELEPHONE (OUTPATIENT)
Dept: ALLERGY | Facility: CLINIC | Age: 55
End: 2025-01-16

## 2025-01-16 DIAGNOSIS — Z12.31 ENCOUNTER FOR SCREENING MAMMOGRAM FOR MALIGNANT NEOPLASM OF BREAST: ICD-10-CM

## 2025-01-16 PROCEDURE — 77067 SCR MAMMO BI INCL CAD: CPT | Performed by: SPECIALIST

## 2025-01-16 PROCEDURE — 77063 BREAST TOMOSYNTHESIS BI: CPT | Performed by: SPECIALIST

## 2025-01-16 NOTE — TELEPHONE ENCOUNTER
I would strongly recommend patient consult a dermatologist first. Her rash sounds like Contact Dermatitis.  She would need patch testing,  Not opposed to allergy skin tests to mold only; but we don't skin test to very many species.  HENNA

## 2025-01-16 NOTE — TELEPHONE ENCOUNTER
Triage)    Assessment/Problem:  Patient's call transferred from the phone room.    She offers that she has a rash to back consisting of small red papules. She offers that the rash is present on the entire back and is now spreading to waist and lower abdomen.     She complains of pruritus of affected areas.     Denies febrile illness, drainage from skin, edema of skin, difficulty breathing or swallowing.     She reports that she is nauseated, but has not vomited.     She denies pain to affected area.     Patient feels that the cause of the rash is an allergy to mold. Blood tests for mold allergy were negative.  Patient is asking for allergy skin test for mold.     Patient informed that it is possible that the allergy skin test to mold may not give her more information as blood tests to mold were negative.     Dr. Johnson, please advise if you would like patient to follow-up for skin allergy testing.    Medications)    Allegra 180 mg daily    Applying \"oil\" to affected area, not applying any over-the-counter or prescription topical medication.     Plan)    Patient advised that if she develops fever, difficulty breathing/swallowing, swelling of face/lips/tongue/throat she should call 911 or present to the Emergency Department immediately.      Patient informed she should present to Urgent Care today for evaluation and treatment.     Nurse will call back with his further advice.

## 2025-01-16 NOTE — TELEPHONE ENCOUNTER
Spoke with patient. Verified name and date  of birth. Informed patient of Dr. Johnson' recommendations. Patient verbalizes understanding and wishes to proceed with testing to molds. Appointment scheduled for 2/4/25 at 5 pm. Patient is aware to refrain from allergy medication 5 days prior to appointment.

## 2025-01-16 NOTE — TELEPHONE ENCOUNTER
Patient states that she has a rash on her back. Patient is not sure what it might be. Patient would like to speak with the nurse about this.

## 2025-01-31 DIAGNOSIS — F41.1 GAD (GENERALIZED ANXIETY DISORDER): ICD-10-CM

## 2025-01-31 RX ORDER — VENLAFAXINE HYDROCHLORIDE 75 MG/1
75 CAPSULE, EXTENDED RELEASE ORAL DAILY
Qty: 30 CAPSULE | Refills: 0 | Status: SHIPPED | OUTPATIENT
Start: 2025-01-31

## 2025-01-31 NOTE — TELEPHONE ENCOUNTER
LOV: 8/23/2024  for: Medication Follow-Up   Patient advised to RTC on:  PRN  Medication Quantity Refills Start End   venlafaxine ER 75 MG Oral Capsule SR 24 Hr 30 capsule 0 12/31/2024 --   Sig:   TAKE 1 CAPSULE(75 MG) BY MOUTH DAILY

## 2025-02-04 ENCOUNTER — OFFICE VISIT (OUTPATIENT)
Dept: ALLERGY | Facility: CLINIC | Age: 55
End: 2025-02-04
Payer: MEDICAID

## 2025-02-04 ENCOUNTER — APPOINTMENT (OUTPATIENT)
Dept: ALLERGY | Facility: CLINIC | Age: 55
End: 2025-02-04

## 2025-02-04 VITALS
HEART RATE: 84 BPM | WEIGHT: 112 LBS | BODY MASS INDEX: 19.12 KG/M2 | HEIGHT: 64 IN | SYSTOLIC BLOOD PRESSURE: 128 MMHG | DIASTOLIC BLOOD PRESSURE: 86 MMHG | RESPIRATION RATE: 16 BRPM | OXYGEN SATURATION: 99 %

## 2025-02-04 DIAGNOSIS — J30.2 SEASONAL AND PERENNIAL ALLERGIC RHINOCONJUNCTIVITIS: Primary | ICD-10-CM

## 2025-02-04 DIAGNOSIS — J30.89 SEASONAL AND PERENNIAL ALLERGIC RHINOCONJUNCTIVITIS: Primary | ICD-10-CM

## 2025-02-04 DIAGNOSIS — H10.10 SEASONAL AND PERENNIAL ALLERGIC RHINOCONJUNCTIVITIS: Primary | ICD-10-CM

## 2025-02-04 DIAGNOSIS — J30.89 NON-SEASONAL ALLERGIC RHINITIS DUE TO FUNGAL SPORES: ICD-10-CM

## 2025-02-04 DIAGNOSIS — J30.89 ENVIRONMENTAL AND SEASONAL ALLERGIES: ICD-10-CM

## 2025-02-04 DIAGNOSIS — R05.3 CHRONIC COUGH: ICD-10-CM

## 2025-02-04 DIAGNOSIS — R09.82 PND (POST-NASAL DRIP): ICD-10-CM

## 2025-02-04 DIAGNOSIS — F41.9 ANXIETY: ICD-10-CM

## 2025-02-04 DIAGNOSIS — K44.9 HIATAL HERNIA: ICD-10-CM

## 2025-02-04 DIAGNOSIS — J32.9 CHRONIC SINUSITIS, UNSPECIFIED LOCATION: Primary | ICD-10-CM

## 2025-02-04 PROCEDURE — 95024 IQ TESTS W/ALLERGENIC XTRCS: CPT | Performed by: ALLERGY & IMMUNOLOGY

## 2025-02-04 PROCEDURE — 95004 PERQ TESTS W/ALRGNC XTRCS: CPT | Performed by: ALLERGY & IMMUNOLOGY

## 2025-02-04 PROCEDURE — 99213 OFFICE O/P EST LOW 20 MIN: CPT | Performed by: ALLERGY & IMMUNOLOGY

## 2025-02-04 NOTE — PATIENT INSTRUCTIONS
Today, we will perform allergy skin testing to aeroallergens-adult profile-[place].  Patient has stopped all allergy medications more than 5 days prior to today's testing    Patient was allergy skin test positive to tree, grass, weed pollens; cat dander, dog dander, only Epicoccum mold-extremely minimal growth in bedroom closet    Patient will try to keep cat out of the bedroom and, especially, off the bed-if possible    Patient will try behind the counter 12-hour Allegra-D in the morning.  She will add over-the-counter Xyzal or Zyrtec-1 tablet at bedtime.    Patient will follow-up in my [place] allergy clinic in [facility] in 3 to 4 weeks.

## 2025-02-04 NOTE — PROGRESS NOTES
Brianna Logan is a 54 year old female.    HPI:     Chief Complaint   Patient presents with    Allergies     Patient here for a for follow up to get skin testing for molds. Patient states she has not taken any antihistamines in the last 5 days      Today, we will perform allergy skin test to aeroallergens-adult profile-[place] region  Patient has stopped all allergy medicine for the past 5 days.    Patient initially presented with chronic rhinosinusitis/postnasal drainage/occasional cough.  Recent RAST testing has been negative to all molds tested-concerning molds were tested due to mold home inspection results.    In addition to the mold inspection reports, patient now reports that she has had a pet dog for 10 years with no access to her bedroom.  However she has had a pet cat for the last year, which has access to her bedroom and often sleeps on the bed with the patient-in fact occasionally sleeps on patient's chest.        HISTORY:  Past Medical History:    Anxiety    Anxiety state, unspecified    Breast CA (HCC)    DCIS    Depression    DJD (degenerative joint disease), lumbar    Headache(784.0)    Hiatal hernia    Insomnia, unspecified    Intestinal infection due to Clostridium difficile    Multinodular goiter    Vaginitis and vulvovaginitis, unspecified      Past Surgical History:   Procedure Laterality Date    Appendectomy  1989    Colonoscopy  6/2016    D & c  2009    SAB    Egd  6/2016    Lumpectomy right  07/2015    DCIS      Family History   Problem Relation Age of Onset    Other (acute venous thrombosis of the deep vessels of the lower extremity) Mother 63    Cancer Maternal Grandfather         rectal cancer    Other (Other) Father 55        choked on food    Breast Cancer Self 45      Social History:   Social History     Socioeconomic History    Marital status:     Number of children: 1   Occupational History    Occupation: haristylist     Comment: unemployed   Tobacco Use    Smoking status:  Never     Passive exposure: Never    Smokeless tobacco: Never   Vaping Use    Vaping status: Never Used   Substance and Sexual Activity    Alcohol use: No     Alcohol/week: 0.0 standard drinks of alcohol    Drug use: No    Sexual activity: Yes     Partners: Male   Other Topics Concern    Caffeine Concern Yes     Comment: 3 cups daily    Exercise Yes     Comment: hiking 3 times a week    Seat Belt Yes   Social History Narrative    Daughter is 10 yo.    Here with .    She does not work outside the home.     No transportation problems.    She is [employer].             Social Drivers of Health      [facility]        Medications (Active prior to today's visit):  Current Outpatient Medications   Medication Sig Dispense Refill    venlafaxine ER 75 MG Oral Capsule SR 24 Hr Take 1 capsule (75 mg total) by mouth daily. 30 capsule 0    ALPRAZolam 0.5 MG Oral Tab Take 1 tablet (0.5 mg total) by mouth daily as needed. 30 tablet 1    zolpidem 5 MG Oral Tab Take 1 tablet (5 mg total) by mouth nightly as needed for Sleep. 30 tablet 1    SUMAtriptan (IMITREX) 50 MG Oral Tab Take 1 tablet (50 mg total) by mouth every 2 (two) hours as needed for Migraine. Use at onset; repeat once after 2 HRS-ONLY 2 IN 24 HR MAX 9 tablet 2    folic acid 1 MG Oral Tab Take 1 tablet (1 mg total) by mouth daily. 90 tablet 1    lisinopril 10 MG Oral Tab Take 1 tablet (10 mg total) by mouth daily. 30 tablet 0    Multiple Vitamins-Minerals (MULTIVITAMIN ADULT) Oral Tab Take by mouth.      valACYclovir 500 MG Oral Tab Take 2 tablets (1,000 mg total) by mouth 3 (three) times daily. (Patient not taking: Reported on 2/4/2025)      HYDROcodone-acetaminophen 5-325 MG Oral Tab Take 1 tablet by mouth. (Patient not taking: Reported on 2/4/2025)         Allergies:  Allergies[1]      ROS:   Review of Systems   Constitutional:  Negative for activity change, appetite change, chills, diaphoresis, fatigue, fever and  unexpected weight change.   HENT:  Negative for congestion, ear discharge, ear pain, facial swelling, hearing loss, mouth sores, postnasal drip, rhinorrhea, sinus pressure, sinus pain, sneezing, sore throat, tinnitus, trouble swallowing and voice change.    Eyes:  Negative for pain, discharge, redness and itching.   Respiratory:  Negative for apnea, cough, choking, chest tightness, shortness of breath, wheezing and stridor.    Cardiovascular:  Negative for chest pain and palpitations.   Gastrointestinal:  Negative for abdominal distention, abdominal pain, constipation, diarrhea, nausea and vomiting.   Endocrine: Negative for cold intolerance and heat intolerance.   Musculoskeletal:  Negative for arthralgias, joint swelling and myalgias.   Skin:  Negative for pallor and rash.   Allergic/Immunologic: Negative for environmental allergies, food allergies and immunocompromised state.   Neurological:  Negative for headaches.   Psychiatric/Behavioral:  Negative for behavioral problems and sleep disturbance.           PHYSICAL EXAM:   Physical Exam  Constitutional:       Appearance: She is normal weight.   HENT:      Head: No right periorbital erythema or left periorbital erythema.      Right Ear: Tympanic membrane normal. There is no impacted cerumen.      Left Ear: Tympanic membrane normal. There is no impacted cerumen.      Nose: No congestion or rhinorrhea.      Mouth/Throat:      Pharynx: No oropharyngeal exudate or posterior oropharyngeal erythema.   Eyes:      General: Lids are normal. No allergic shiner.        Right eye: No discharge.         Left eye: No discharge.      Conjunctiva/sclera: Conjunctivae normal.      Right eye: Right conjunctiva is not injected. No exudate.     Left eye: Left conjunctiva is not injected. No exudate.  Cardiovascular:      Rate and Rhythm: Normal rate and regular rhythm.      Heart sounds: Normal heart sounds.   Pulmonary:      Effort: No tachypnea, prolonged expiration or respiratory  distress.      Breath sounds: No stridor or decreased air movement. No decreased breath sounds, wheezing, rhonchi or rales.   Skin:     Coloration: Skin is not cyanotic or pale.      Findings: No acne, ecchymosis, erythema, petechiae or rash. Rash is not macular, nodular, papular, purpuric, pustular, scaling, urticarial or vesicular.           ASSESSMENT   Assessment   Encounter Diagnoses   Name Primary?    Chronic sinusitis, unspecified location Yes    PND (post-nasal drip)     Chronic cough     Non-seasonal allergic rhinitis due to fungal spores     Anxiety     Hiatal hernia        PLAN     Today, we will perform allergy skin testing to aeroallergens-adult profile-[place].  Patient has stopped all allergy medicine for at least 5 days prior to today's testing.    Patient was allergy skin test positive to tree, grass, weed pollens; cat dander, dog dander; and only Epicoccum mold.-Extremely minimal growth in her closet.         Orders This Visit:  No orders of the defined types were placed in this encounter.      Meds This Visit:  Requested Prescriptions      No prescriptions requested or ordered in this encounter       Imaging & Referrals:  None     2/4/2025  Nabeel Johnson MD      If medication samples were provided today, they were provided solely for patient education and training related to self administration of these medications.  Teaching, instruction and sample was provided to the patient by myself.  Teaching included  a review of potential adverse side effects as well as potential efficacy.  Patient's questions were answered in regards to medication administration and dosing and potential side effects. Teaching was provided via the teach back method         [1] No Known Allergies

## 2025-03-12 DIAGNOSIS — F41.1 GAD (GENERALIZED ANXIETY DISORDER): ICD-10-CM

## 2025-03-12 DIAGNOSIS — F51.01 PRIMARY INSOMNIA: ICD-10-CM

## 2025-03-12 NOTE — TELEPHONE ENCOUNTER
LOV: 8/23/2024  for: Medication Follow-Up   Patient advised to RTC on:  PRN  Medication Quantity Refills Start End   ALPRAZolam 0.5 MG Oral Tab 30 tablet 1 8/23/2024 --   Sig:   Take 1 tablet (0.5 mg total) by mouth daily as needed.       Medication Quantity Refills Start End   zolpidem 5 MG Oral Tab 30 tablet 1 7/23/2024 --   Sig:   Take 1 tablet (5 mg total) by mouth nightly as needed for Sleep.

## 2025-03-17 ENCOUNTER — TELEPHONE (OUTPATIENT)
Dept: NEUROLOGY | Facility: CLINIC | Age: 55
End: 2025-03-17

## 2025-03-17 ENCOUNTER — OFFICE VISIT (OUTPATIENT)
Dept: NEUROLOGY | Facility: CLINIC | Age: 55
End: 2025-03-17
Payer: MEDICAID

## 2025-03-17 VITALS
SYSTOLIC BLOOD PRESSURE: 114 MMHG | BODY MASS INDEX: 21.11 KG/M2 | RESPIRATION RATE: 18 BRPM | OXYGEN SATURATION: 98 % | DIASTOLIC BLOOD PRESSURE: 70 MMHG | WEIGHT: 123.63 LBS | HEIGHT: 64 IN | HEART RATE: 76 BPM

## 2025-03-17 DIAGNOSIS — G43.709 CHRONIC MIGRAINE WITHOUT AURA WITHOUT STATUS MIGRAINOSUS, NOT INTRACTABLE: Primary | ICD-10-CM

## 2025-03-17 PROCEDURE — 99204 OFFICE O/P NEW MOD 45 MIN: CPT | Performed by: OTHER

## 2025-03-17 RX ORDER — ATOGEPANT 60 MG/1
60 TABLET ORAL DAILY
Qty: 30 TABLET | Refills: 3 | Status: SHIPPED | OUTPATIENT
Start: 2025-03-17

## 2025-03-17 RX ORDER — ZOLPIDEM TARTRATE 5 MG/1
5 TABLET ORAL NIGHTLY PRN
Qty: 30 TABLET | Refills: 0 | Status: SHIPPED | OUTPATIENT
Start: 2025-03-17

## 2025-03-17 RX ORDER — ALPRAZOLAM 0.5 MG
0.5 TABLET ORAL
Qty: 30 TABLET | Refills: 0 | Status: SHIPPED | OUTPATIENT
Start: 2025-03-17

## 2025-03-17 NOTE — TELEPHONE ENCOUNTER
Received the following message via [employer] regarding Aulipta 60mg:    Your request was approved based on the initial information provided at the time of the coverage request submission

## 2025-03-17 NOTE — H&P
Neurology H&P    Brianna Logan Patient Status:  No patient class for patient encounter    1970 MRN [id number]   Location [facility] Attending No att. providers found   Hosp Day # 0 PCP Anna Bryant MD     Subjective:  Brianna Logan is a(n) 54 year old female with a PMH significant for HTN, anxiety, migraines and who comes to the neurology clinic for evaluation of dizziness and headaches. She states that she has had headaches for years. She states that she wakes up every night at 4am with a headache. She states that her headaches are all over her head. She states that they range in severity maybe 5-9/10 on the pain scale. She states that she takes her daughters Nurtec and this relieves her headache. She states that she has tried sumatriptan as well. She has had a normal MRI in the past. Her daughter has migraines. She sometimes has nausea or vomiting but no photo or phonophobia. She is on venlafaxine for depression and anxiety. She also tells me that she gets dizzy when her blood pressure drops too much but also sometimes when she has a bad migraine. She states that she has no room spinning but just feels lightheaded for maybe up to an hour. This resolves when she takes Nurtec form her daughter.  She only sleeps 4 hours per night and that she cannot sleep any longer than this. .    Current Medications:  Current Outpatient Medications   Medication Sig Dispense Refill    venlafaxine ER 75 MG Oral Capsule SR 24 Hr Take 1 capsule (75 mg total) by mouth daily. 30 capsule 0    valACYclovir 500 MG Oral Tab Take 2 tablets (1,000 mg total) by mouth 3 (three) times daily. (Patient not taking: Reported on 2025)      HYDROcodone-acetaminophen 5-325 MG Oral Tab Take 1 tablet by mouth. (Patient not taking: Reported on 2025)      ALPRAZolam 0.5 MG Oral Tab Take 1 tablet (0.5 mg total) by mouth daily as needed. 30 tablet 1    zolpidem 5 MG Oral Tab Take 1 tablet  (5 mg total) by mouth nightly as needed for Sleep. 30 tablet 1    SUMAtriptan (IMITREX) 50 MG Oral Tab Take 1 tablet (50 mg total) by mouth every 2 (two) hours as needed for Migraine. Use at onset; repeat once after 2 HRS-ONLY 2 IN 24 HR MAX 9 tablet 2    folic acid 1 MG Oral Tab Take 1 tablet (1 mg total) by mouth daily. 90 tablet 1    lisinopril 10 MG Oral Tab Take 1 tablet (10 mg total) by mouth daily. 30 tablet 0    Multiple Vitamins-Minerals (MULTIVITAMIN ADULT) Oral Tab Take by mouth.         Problem List:  Patient Active Problem List   Diagnosis    Dysmenorrhea    Chest pain, unspecified    Insomnia, unspecified    Acute pharyngitis    Lumbago    Headache(784.0)    Leukocytopenia    Umbilical hernia without mention of obstruction or gangrene    Otalgia, unspecified    Other acute reactions to stress    Nontoxic uninodular goiter    Other and unspecified ovarian cyst    Dyspepsia and other specified disorders of function of stomach    Leiomyoma of uterus, unspecified    Unspecified disorder of gallbladder    Displacement of lumbar intervertebral disc without myelopathy    Ductal carcinoma in situ (DCIS) of right breast    Iron deficiency anemia due to chronic blood loss    Anxiety    Pneumonia of both lungs due to infectious organism    Hiatal hernia    Chronic neutropenia    Normocytic anemia    Neutropenia    Folic acid deficiency    Yeast vaginitis    Pleuritic chest pain    Elevated blood pressure reading in office without diagnosis of hypertension    Multinodular goiter    Vaginal discharge    Excessive sweating    Daily headache    Ear pain, bilateral    Acute vaginitis    Elevated cancer antigen 125 (CA-125)    Left lower quadrant pain    Metrorrhagia    Uterovaginal prolapse, incomplete    Left ovarian cyst    Intramural leiomyoma of uterus    Secondary dysmenorrhea       PMHx:  Past Medical History:    Anxiety    Anxiety state, unspecified    Breast CA (HCC)    DCIS    Depression    DJD (degenerative  joint disease), lumbar    Headache(784.0)    Hiatal hernia    Insomnia, unspecified    Intestinal infection due to Clostridium difficile    Multinodular goiter    Vaginitis and vulvovaginitis, unspecified       PSHx:  Past Surgical History:   Procedure Laterality Date    Appendectomy  1989    Colonoscopy  6/2016    D & c  2009    SAB    Egd  6/2016    Lumpectomy right  07/2015    DCIS       SocHx:  Social History     Socioeconomic History    Marital status:     Number of children: 1   Occupational History    Occupation: haristylist     Comment: unemployed   Tobacco Use    Smoking status: Never     Passive exposure: Never    Smokeless tobacco: Never   Vaping Use    Vaping status: Never Used   Substance and Sexual Activity    Alcohol use: No     Alcohol/week: 0.0 standard drinks of alcohol    Drug use: No    Sexual activity: Yes     Partners: Male   Other Topics Concern    Caffeine Concern Yes     Comment: 3 cups daily    Exercise Yes     Comment: hiking 3 times a week    Seat Belt Yes   Social History Narrative    Daughter is 10 yo.    Here with .    She does not work outside the home.     No transportation problems.    She is [facility].             Social Drivers of Health      Received from [employer], [employer]    [facility]       Family History:  Family History   Problem Relation Age of Onset    Other (acute venous thrombosis of the deep vessels of the lower extremity) Mother 63    Cancer Maternal Grandfather         rectal cancer    Other (Other) Father 55        choked on food    Breast Cancer Self 45           ROS:  10 point ROS completed and was negative, except for pertinent positive and negatives stated in subjective.    Objective/Physical Exam:    Vital Signs:  Last menstrual period 07/18/2024, not currently breastfeeding.    Gen: Awake and in no apparent distress  HEENT: moist mucus membranes  Neck: Supple  Cardiovascular: Regular rate and rhythm, no murmur  Pulm: CTAB  GI:  non-tender, normal bowel sounds  Skin: normal, dry  Extremities: No clubbing or cyanosis      Neurologic:   MENTAL STATUS: alert, ox3, normal attention, language and fund of knowledge.      CRANIAL NERVES II to XII: PERRLA, no ptosis or diplopia, EOM intact, facial sensation intact, strong eye closure, face is symmetric, no dysarthria, tongue midline,  no tongue fasciculations or atrophy, strong shoulder shrug.    MOTOR EXAMINATION: normal tone, no fasciculations, normal strength throughout in UEs and LEs      SENSORY EXAMINATION:  UE: intact to light touch, pinprick intact  LE: intact to light touch, pinprick intact    COORDINATION:  No dysmetria, or intention tremors     REFLEXES: 2+ at biceps, 2+ brachioradialis, 2+ at patella     GAIT: normal stance, normal toe gait and heel gait, tandem well.    Romberg's: negative        Labs:       Imaging:  MRI Brain 2023  CONCLUSION:       1. No significant signal abnormality in the brain parenchyma.  Marked mucoperiosteal thickening of the paranasal sinuses.     Assessment:  This is a 55 y/o female with migraine headaches.  She does report frequent migraine headaches for years, and states that she takes her daughter's Nurtec which works very well for her.  I had told her not to take her daughter's medication.  I will start Qulipta 60 mg daily.      Plan:  Migraine w/ dizziness  - Start Qulipta 60mg PO Qday  - MRI Brain reviewed    Ray Melgoza DO  Neurology

## 2025-03-17 NOTE — TELEPHONE ENCOUNTER
Qulipta Approved from 1/1/2025 to 3/7/2026.    Faxed to dispensing pharmacy with confirmation.

## 2025-03-25 DIAGNOSIS — F41.1 GAD (GENERALIZED ANXIETY DISORDER): ICD-10-CM

## 2025-03-27 RX ORDER — VENLAFAXINE HYDROCHLORIDE 75 MG/1
75 CAPSULE, EXTENDED RELEASE ORAL DAILY
Qty: 30 CAPSULE | Refills: 0 | Status: SHIPPED | OUTPATIENT
Start: 2025-03-27

## 2025-03-27 NOTE — TELEPHONE ENCOUNTER
LOV: 10/24/2024  Future Visit: 6/18/2025  Last Rx: 1/31/2025 30 caps 0 refills   Last Labs: 10/25/2024  Per protocol to provider     Psychiatric Non-Scheduled (Anti-Anxiety) Aysdaw2003/25/2025 03:21 PM   Protocol Details In person appointment or virtual visit in the past 6 mos or appointment in next 3 mos    Depression Screening completed within the past 12 months    Medication is active on med list

## 2025-05-07 DIAGNOSIS — F41.1 GAD (GENERALIZED ANXIETY DISORDER): ICD-10-CM

## 2025-05-08 ENCOUNTER — TELEPHONE (OUTPATIENT)
Age: 55
End: 2025-05-08

## 2025-05-08 RX ORDER — VENLAFAXINE HYDROCHLORIDE 75 MG/1
75 CAPSULE, EXTENDED RELEASE ORAL DAILY
Qty: 30 CAPSULE | Refills: 0 | Status: SHIPPED | OUTPATIENT
Start: 2025-05-08

## 2025-05-08 NOTE — TELEPHONE ENCOUNTER
Requested Prescriptions     Pending Prescriptions Disp Refills    VENLAFAXINE ER 75 MG Oral Capsule SR 24 Hr [Pharmacy Med Name: VENLAFAXINE ER 75MG CAPSULES] 30 capsule 0     Sig: TAKE 1 CAPSULE(75 MG) BY MOUTH DAILY     Last refill 3/27/25 #30  LOV 10/25/24  Future Appointments   Date Time Provider Department Center          6/18/2025  4:30 PM Anna Bryant MD [facility] [id number]

## 2025-05-12 DIAGNOSIS — D50.0 IRON DEFICIENCY ANEMIA DUE TO CHRONIC BLOOD LOSS: Primary | ICD-10-CM

## 2025-05-13 ENCOUNTER — NURSE ONLY (OUTPATIENT)
Age: 55
End: 2025-05-13
Attending: INTERNAL MEDICINE
Payer: MEDICAID

## 2025-05-13 DIAGNOSIS — D50.0 IRON DEFICIENCY ANEMIA DUE TO CHRONIC BLOOD LOSS: ICD-10-CM

## 2025-05-13 LAB
BASOPHILS # BLD AUTO: 0.06 X10(3) UL (ref 0–0.2)
BASOPHILS NFR BLD AUTO: 1.4 %
DEPRECATED HBV CORE AB SER IA-ACNC: 37 NG/ML (ref 50–306)
EOSINOPHIL # BLD AUTO: 0.1 X10(3) UL (ref 0–0.7)
EOSINOPHIL NFR BLD AUTO: 2.3 %
ERYTHROCYTE [DISTWIDTH] IN BLOOD BY AUTOMATED COUNT: 12.2 %
HCT VFR BLD AUTO: 42 % (ref 35–48)
HGB BLD-MCNC: 14.4 G/DL (ref 12–16)
IMM GRANULOCYTES # BLD AUTO: 0 X10(3) UL (ref 0–1)
IMM GRANULOCYTES NFR BLD: 0 %
LYMPHOCYTES # BLD AUTO: 2.75 X10(3) UL (ref 1–4)
LYMPHOCYTES NFR BLD AUTO: 64.1 %
MCH RBC QN AUTO: 30.6 PG (ref 26–34)
MCHC RBC AUTO-ENTMCNC: 34.3 G/DL (ref 31–37)
MCV RBC AUTO: 89.4 FL (ref 80–100)
MONOCYTES # BLD AUTO: 0.51 X10(3) UL (ref 0.1–1)
MONOCYTES NFR BLD AUTO: 11.9 %
NEUTROPHILS # BLD AUTO: 0.87 X10 (3) UL (ref 1.5–7.7)
NEUTROPHILS # BLD AUTO: 0.87 X10(3) UL (ref 1.5–7.7)
NEUTROPHILS NFR BLD AUTO: 20.3 %
PLATELET # BLD AUTO: 315 10(3)UL (ref 150–450)
RBC # BLD AUTO: 4.7 X10(6)UL (ref 3.8–5.3)
WBC # BLD AUTO: 4.3 X10(3) UL (ref 4–11)

## 2025-05-21 ENCOUNTER — MED REC SCAN ONLY (OUTPATIENT)
Dept: FAMILY MEDICINE CLINIC | Facility: CLINIC | Age: 55
End: 2025-05-21

## 2025-05-27 ENCOUNTER — HOSPITAL ENCOUNTER (OUTPATIENT)
Dept: MAMMOGRAPHY | Facility: HOSPITAL | Age: 55
Discharge: HOME | End: 2025-05-27
Attending: SPECIALIST
Payer: MEDICAID

## 2025-05-27 DIAGNOSIS — N60.19 FIBROCYSTIC BREAST DISEASE: ICD-10-CM

## 2025-05-27 PROCEDURE — 76641 ULTRASOUND BREAST COMPLETE: CPT | Performed by: SPECIALIST

## 2025-06-13 DIAGNOSIS — F41.1 GAD (GENERALIZED ANXIETY DISORDER): ICD-10-CM

## 2025-06-16 RX ORDER — VENLAFAXINE HYDROCHLORIDE 75 MG/1
75 CAPSULE, EXTENDED RELEASE ORAL DAILY
Qty: 30 CAPSULE | Refills: 0 | Status: SHIPPED | OUTPATIENT
Start: 2025-06-16

## 2025-06-16 NOTE — TELEPHONE ENCOUNTER
Requested Prescriptions     Pending Prescriptions Disp Refills    VENLAFAXINE ER 75 MG Oral Capsule SR 24 Hr [Pharmacy Med Name: VENLAFAXINE ER 75MG CAPSULES] 30 capsule 0     Sig: TAKE 1 CAPSULE(75 MG) BY MOUTH DAILY     Last refill 5/8/25 #30  LOV 10/25/24  Future Appointments   Date Time Provider Department Center   6/18/2025  4:30 PM Anna Bryant MD [facility] [id number]     #30 pended for approval

## 2025-06-18 ENCOUNTER — OFFICE VISIT (OUTPATIENT)
Dept: FAMILY MEDICINE CLINIC | Facility: CLINIC | Age: 55
End: 2025-06-18
Payer: MEDICAID

## 2025-06-18 VITALS
SYSTOLIC BLOOD PRESSURE: 116 MMHG | TEMPERATURE: 97 F | WEIGHT: 126 LBS | HEIGHT: 64 IN | BODY MASS INDEX: 21.51 KG/M2 | HEART RATE: 80 BPM | DIASTOLIC BLOOD PRESSURE: 78 MMHG | RESPIRATION RATE: 16 BRPM

## 2025-06-18 DIAGNOSIS — G43.909 MIGRAINE WITHOUT STATUS MIGRAINOSUS, NOT INTRACTABLE, UNSPECIFIED MIGRAINE TYPE: ICD-10-CM

## 2025-06-18 DIAGNOSIS — F51.01 PRIMARY INSOMNIA: ICD-10-CM

## 2025-06-18 DIAGNOSIS — F41.1 GAD (GENERALIZED ANXIETY DISORDER): ICD-10-CM

## 2025-06-18 DIAGNOSIS — Z00.00 LABORATORY TESTS ORDERED AS PART OF A COMPLETE PHYSICAL EXAM (CPE): Primary | ICD-10-CM

## 2025-06-18 DIAGNOSIS — E83.19 IRON EXCESS: ICD-10-CM

## 2025-06-18 DIAGNOSIS — I10 HYPERTENSION, UNSPECIFIED TYPE: ICD-10-CM

## 2025-06-18 PROCEDURE — 99214 OFFICE O/P EST MOD 30 MIN: CPT | Performed by: FAMILY MEDICINE

## 2025-06-18 RX ORDER — LISINOPRIL 10 MG/1
10 TABLET ORAL DAILY
Qty: 90 TABLET | Refills: 1 | Status: SHIPPED | OUTPATIENT
Start: 2025-06-18

## 2025-06-18 RX ORDER — VENLAFAXINE HYDROCHLORIDE 75 MG/1
75 CAPSULE, EXTENDED RELEASE ORAL DAILY
Qty: 30 CAPSULE | Refills: 5 | Status: SHIPPED | OUTPATIENT
Start: 2025-06-18

## 2025-06-18 RX ORDER — ALPRAZOLAM 0.5 MG
0.5 TABLET ORAL
Qty: 30 TABLET | Refills: 1 | Status: SHIPPED | OUTPATIENT
Start: 2025-06-18

## 2025-06-18 RX ORDER — SUMATRIPTAN 50 MG/1
50 TABLET, FILM COATED ORAL EVERY 2 HOUR PRN
Qty: 9 TABLET | Refills: 3 | Status: SHIPPED | OUTPATIENT
Start: 2025-06-18

## 2025-06-18 RX ORDER — ZOLPIDEM TARTRATE 5 MG/1
5 TABLET ORAL NIGHTLY PRN
Qty: 30 TABLET | Refills: 2 | Status: SHIPPED | OUTPATIENT
Start: 2025-06-18

## 2025-06-18 NOTE — PROGRESS NOTES
Brianna Logan is a 55 year old female.  HPI:     The following individual(s) verbally consented to be recorded using ambient AI listening technology and understand that they can each withdraw their consent to this listening technology at any point by asking the clinician to turn off or pause the recording:    Patient name: Brinana Logan      History of Present Illness  Brianna Logan is a 55 year old female who presents with concerns of weight gain and possible menopause.    She has experienced recent weight gain despite maintaining her usual diet and exercise routine. Her last menstrual period was in March of this year, suggesting a possible perimenopausal transition. She is concerned that hormonal changes may be contributing to her weight gain.    She has a history of low iron levels, with recent blood work indicating low ferritin levels. She is unsure if other iron-related tests were conducted. No constipation.    She is currently taking venlafaxine 37.5 mg, patient takes medication for anxiety.  On PHQ-9 patient says that over the last week several days she is having trouble to sleep at night feeling tired having little energy.  PHQ-9 score came back at 2.  No suicidal or homicidal    On PRANEETH-7 patient says that over the last week several days she is worrying too much about different things and having trouble to relax.  PRANEETH-7 score came back at 2.    Insomnia patient is taking  Zolpidem every second day for sleep, it does help.    Hypertension it is controlled patient needs refill of lisinopril 10 mg daily for blood pressure.    Patient is using Xanax occasionally for anxiety.     She also uses sumatriptan (Imitrex) as needed.  This is for migraine she is seeing Dr. Melgoza and gets her Qulipta which is helping.    She has been experiencing bleeding from hemorrhoids for the past month and has an upcoming colonoscopy and endoscopy scheduled in July to investigate further.    She lives separately from her  daughter, who is in college and working. She has a supportive boyfriend.     Current Medications[1]   Past Medical History[2]   Social History:  Short Social Hx on File[3]     REVIEW OF SYSTEMS:   GENERAL HEALTH: feels well otherwise  SKIN: denies any unusual skin lesions or rashes  HEENT no congestion no runny nose no sore throat  Neck no neck pain   RESPIRATORY: denies shortness of breath with exertion  CARDIOVASCULAR: denies chest pain on exertion  GI: denies abdominal pain and denies heartburn  NEURO: denies headaches  Psych normal mood.    EXAM:   /78 (BP Location: Left arm, Patient Position: Sitting, Cuff Size: adult)   Pulse 80   Temp 96.6 °F (35.9 °C) (Temporal)   Resp 16   Ht 5' 4\" (1.626 m)   Wt 126 lb (57.2 kg)   LMP 07/18/2024 (Exact Date)   BMI 21.63 kg/m²   GENERAL: well developed, well nourished,in no apparent distress  SKIN: no rashes,no suspicious lesions  HEENT: atraumatic, normocephalic,ears and throat are clear  NECK: supple,no adenopathy  LUNGS: clear to auscultation  CARDIO: RRR without murmur  GI: good BS's,no masses, HSM or tenderness  EXTREMITIES: no  edema  Psychiatric - alert  and oriented x3, normal mood      Results for orders placed or performed in visit on 05/13/25   CBC W/DIFF [E]    Collection Time: 05/13/25  1:06 PM   Result Value Ref Range    WBC 4.3 4.0 - 11.0 x10(3) uL    RBC 4.70 3.80 - 5.30 x10(6)uL    HGB 14.4 12.0 - 16.0 g/dL    HCT 42.0 35.0 - 48.0 %    .0 150.0 - 450.0 10(3)uL    MCV 89.4 80.0 - 100.0 fL    MCH 30.6 26.0 - 34.0 pg    MCHC 34.3 31.0 - 37.0 g/dL    RDW 12.2 %    Neutrophil Absolute Prelim 0.87 (L) 1.50 - 7.70 x10 (3) uL    Neutrophil Absolute 0.87 (L) 1.50 - 7.70 x10(3) uL    Lymphocyte Absolute 2.75 1.00 - 4.00 x10(3) uL    Monocyte Absolute 0.51 0.10 - 1.00 x10(3) uL    Eosinophil Absolute 0.10 0.00 - 0.70 x10(3) uL    Basophil Absolute 0.06 0.00 - 0.20 x10(3) uL    Immature Granulocyte Absolute 0.00 0.00 - 1.00 x10(3) uL    Neutrophil %  20.3 %    Lymphocyte % 64.1 %    Monocyte % 11.9 %    Eosinophil % 2.3 %    Basophil % 1.4 %    Immature Granulocyte % 0.0 %   FERRITIN [E]    Collection Time: 05/13/25  1:06 PM   Result Value Ref Range    Ferritin 37 (L) 50 - 306 ng/mL      ASSESSMENT AND PLAN:     Encounter Diagnoses   Name Primary?    Primary insomnia     PRANEETH (generalized anxiety disorder)     Migraine without status migrainosus, not intractable, unspecified migraine type     Laboratory tests ordered as part of a complete physical exam (CPE) Yes    Iron excess     Hypertension, unspecified type        Orders Placed This Encounter   Procedures    CBC With Differential With Platelet    Comp Metabolic Panel (14)    Lipid Panel    Urinalysis with Culture Reflex    TSH W Reflex To Free T4    Iron And Tibc [E]    Ferritin [E]       Meds & Refills for this Visit:  Requested Prescriptions     Signed Prescriptions Disp Refills    zolpidem 5 MG Oral Tab 30 tablet 2     Sig: Take 1 tablet (5 mg total) by mouth nightly as needed for Sleep.    venlafaxine ER 75 MG Oral Capsule SR 24 Hr 30 capsule 5     Sig: Take 1 capsule (75 mg total) by mouth daily.    SUMAtriptan (IMITREX) 50 MG Oral Tab 9 tablet 3     Sig: Take 1 tablet (50 mg total) by mouth every 2 (two) hours as needed for Migraine. Use at onset; repeat once after 2 HRS-ONLY 2 IN 24 HR MAX    ALPRAZolam 0.5 MG Oral Tab 30 tablet 1     Sig: Take 1 tablet (0.5 mg total) by mouth daily as needed.    lisinopril 10 MG Oral Tab 90 tablet 1     Sig: Take 1 tablet (10 mg total) by mouth daily.      Continue current meds.   Watch diet for fats and carbs.   Stay active.      Schedule complete physical.  Do fasting blood work 1 week prior next visit.    Imaging & Consults:  None    The patient indicates understanding of these issues and agrees to the plan.  The patient is asked to return in 6 months for medication check.  Schedule physical for end of summer.       [1]   Current Outpatient Medications   Medication  Sig Dispense Refill    zolpidem 5 MG Oral Tab Take 1 tablet (5 mg total) by mouth nightly as needed for Sleep. 30 tablet 2    venlafaxine ER 75 MG Oral Capsule SR 24 Hr Take 1 capsule (75 mg total) by mouth daily. 30 capsule 5    SUMAtriptan (IMITREX) 50 MG Oral Tab Take 1 tablet (50 mg total) by mouth every 2 (two) hours as needed for Migraine. Use at onset; repeat once after 2 HRS-ONLY 2 IN 24 HR MAX 9 tablet 3    ALPRAZolam 0.5 MG Oral Tab Take 1 tablet (0.5 mg total) by mouth daily as needed. 30 tablet 1    lisinopril 10 MG Oral Tab Take 1 tablet (10 mg total) by mouth daily. 90 tablet 1    HYDROcodone-acetaminophen 5-325 MG Oral Tab Take 1 tablet by mouth.      folic acid 1 MG Oral Tab Take 1 tablet (1 mg total) by mouth daily. 90 tablet 1    Multiple Vitamins-Minerals (MULTIVITAMIN ADULT) Oral Tab Take by mouth.      Atogepant (QULIPTA) 60 MG Oral Tab Take 60 mg by mouth daily. 30 tablet 3    valACYclovir 500 MG Oral Tab Take 2 tablets (1,000 mg total) by mouth 3 (three) times daily.     [2]   Past Medical History:   Anxiety    Anxiety state, unspecified    Breast CA (HCC)    DCIS    Depression    DJD (degenerative joint disease), lumbar    Headache(784.0)    Hiatal hernia    Insomnia, unspecified    Intestinal infection due to Clostridium difficile    Multinodular goiter    Vaginitis and vulvovaginitis, unspecified   [3]   Social History  Socioeconomic History    Marital status:     Number of children: 1   Occupational History    Occupation: haristylist     Comment: unemployed   Tobacco Use    Smoking status: Never     Passive exposure: Never    Smokeless tobacco: Never   Vaping Use    Vaping status: Never Used   Substance and Sexual Activity    Alcohol use: No     Alcohol/week: 0.0 standard drinks of alcohol    Drug use: No    Sexual activity: Yes     Partners: Male   Other Topics Concern    Caffeine Concern Yes     Comment: 3 cups daily    Exercise Yes     Comment: hiking 3 times a week    Seat Belt  Yes   Social History Narrative    Daughter is 10 yo.    Here with .    She does not work outside the home.     No transportation problems.    She is [facility].             Social Drivers of Health      Received from [facility]

## 2025-06-18 NOTE — PATIENT INSTRUCTIONS
Continue current meds.   Watch diet for fats and carbs.   Stay active.      Schedule complete physical.  Do fasting blood work 1 week prior next visit.      Refill policies:      Allow 3 business days for refills; controlled substances may take longer.  Contact your pharmacy at least 5-7 business days prior to running out of medication and have them send an electronic request or submit through the \"request refill\" option thru your [employer] account. No need to do both, as multiple requests will create an automated [employer] message to notify of a denial for one of the duplicated requests, causing you undue confusion.   Refills are NOT addressed on weekends; covering physicians do not authorize routine medications on weekends.  No narcotics or controlled substances are refilled after noon on Fridays or by on call physicians.  By law, narcotics cannot be faxed or phoned into your pharmacy.  If your prescription is due for a refill, you may be due for a follow up appointment. Please call our office at [phone] to make an appointment or schedule an appointment via [employer].  To best provide you care, patients receiving routine medications need to be seen at least twice a year. Patients receiving narcotic/controlled substance medications need to be seen at least once every 3 months.  In the event that your preferred pharmacy does not have the requested medication in stock (ie Backordered), it is your responsibility to find another pharmacy that has the requested medication available. We will gladly send a new prescription to that pharmacy at your request.  controlled substances may not be able to be filled out of state due to license restrictions.  If you have a planned trip, it's best to call your pharmacy at least 5-7 business days to prevent any delays in your medication refill.    Scheduling Tests:    If your physician has ordered radiology tests such as MRI or CT scans, please contact Central Scheduling at  [phone] right away to schedule the test.  Once scheduled, the [facility] Centralized Referral Team will work with your insurance carrier to obtain pre-certification or prior authorization.  Depending on your insurance carrier, approval may take 3-10 days.  It is highly recommended patients assure they have received an authorization before having a test performed.  If test is done without insurance authorization, patient may be responsible for the entire amount billed.      Precertification and Prior Authorizations:  If your physician has recommended that you have a procedure or additional testing performed the [facility] Centralized Referral Team will contact your insurance carrier to obtain pre-certification or prior authorization.    You are strongly encouraged to contact your insurance carrier to verify that your procedure/test has been approved and is a COVERED benefit.  Although the [facility] Centralized Referral Team does its due diligence, the insurance carrier gives the disclaimer that \"Although the procedure is authorized, this does not guarantee payment.\"    Ultimately the patient is responsible for payment.   Thank you for your understanding in this matter.  Paperwork Completion:  If you require FMLA or disability paperwork for your recovery, please make sure to either drop it off or have it faxed to our office at [phone]. Be sure the form has your name and date of birth on it.  The form will be faxed to our Forms Department and they will complete it for you.  There is a 25$ fee for all forms that need to be filled out.  Please be aware there is a 10-14 day turnaround time.  You will need to sign a release of information (DANYELL) form if your paperwork does not come with one.  You may call the Forms Department with any questions at [phone].  Their fax number is [phone].

## 2025-07-10 DIAGNOSIS — G43.709 CHRONIC MIGRAINE WITHOUT AURA WITHOUT STATUS MIGRAINOSUS, NOT INTRACTABLE: ICD-10-CM

## 2025-07-10 RX ORDER — ATOGEPANT 60 MG/1
1 TABLET ORAL DAILY
Qty: 30 TABLET | Refills: 2 | Status: SHIPPED | OUTPATIENT
Start: 2025-07-10

## 2025-07-10 NOTE — TELEPHONE ENCOUNTER
Medication: QULIPTA 60 MG Oral Tab      Date of last refill: 03/17/2025 (#30/3)  [place]     Last office visit: 03/17/2025  Due back to clinic per last office note:  Around 06/17/2025  Date next office visit scheduled:    Future Appointments   Date Time Provider Department Center   9/17/2025  4:30 PM Anna Bryant MD [facility] [id number]           Last OV note recommendation:    Assessment:  This is a 53 y/o female with migraine headaches.  She does report frequent migraine headaches for years, and states that she takes her daughter's Nurtec which works very well for her.  I had told her not to take her daughter's medication.  I will start Qulipta 60 mg daily.        Plan:  Migraine w/ dizziness  - Start Qulipta 60mg PO Qday  - MRI Brain reviewed     Ray Melgoza DO  Neurology

## 2025-08-19 ENCOUNTER — LAB ENCOUNTER (OUTPATIENT)
Dept: LAB | Age: 55
End: 2025-08-19
Attending: FAMILY MEDICINE

## 2025-08-19 DIAGNOSIS — E83.19 IRON EXCESS: ICD-10-CM

## 2025-08-19 DIAGNOSIS — Z00.00 LABORATORY TESTS ORDERED AS PART OF A COMPLETE PHYSICAL EXAM (CPE): ICD-10-CM

## 2025-08-19 LAB
ALBUMIN SERPL-MCNC: 4.5 G/DL (ref 3.2–4.8)
ALBUMIN/GLOB SERPL: 1.7 (ref 1–2)
ALP LIVER SERPL-CCNC: 53 U/L (ref 41–108)
ALT SERPL-CCNC: 15 U/L (ref 10–49)
ANION GAP SERPL CALC-SCNC: 8 MMOL/L (ref 0–18)
AST SERPL-CCNC: 24 U/L (ref ?–34)
BASOPHILS # BLD AUTO: 0.05 X10(3) UL (ref 0–0.2)
BASOPHILS NFR BLD AUTO: 1.8 %
BILIRUB SERPL-MCNC: 0.6 MG/DL (ref 0.3–1.2)
BILIRUB UR QL STRIP.AUTO: NEGATIVE
BUN BLD-MCNC: 12 MG/DL (ref 9–23)
CALCIUM BLD-MCNC: 10 MG/DL (ref 8.7–10.6)
CHLORIDE SERPL-SCNC: 102 MMOL/L (ref 98–112)
CHOLEST SERPL-MCNC: 236 MG/DL (ref ?–200)
CLARITY UR REFRACT.AUTO: CLEAR
CO2 SERPL-SCNC: 30 MMOL/L (ref 21–32)
COLOR UR AUTO: COLORLESS
CREAT BLD-MCNC: 0.75 MG/DL (ref 0.55–1.02)
DEPRECATED HBV CORE AB SER IA-ACNC: 53 NG/ML (ref 50–306)
EGFRCR SERPLBLD CKD-EPI 2021: 95 ML/MIN/1.73M2 (ref 60–?)
EOSINOPHIL # BLD AUTO: 0.07 X10(3) UL (ref 0–0.7)
EOSINOPHIL NFR BLD AUTO: 2.6 %
ERYTHROCYTE [DISTWIDTH] IN BLOOD BY AUTOMATED COUNT: 11.9 %
FASTING PATIENT LIPID ANSWER: NO
FASTING STATUS PATIENT QL REPORTED: NO
GLOBULIN PLAS-MCNC: 2.7 G/DL (ref 2–3.5)
GLUCOSE BLD-MCNC: 110 MG/DL (ref 70–99)
GLUCOSE UR STRIP.AUTO-MCNC: NORMAL MG/DL
HCT VFR BLD AUTO: 39.8 % (ref 35–48)
HDLC SERPL-MCNC: 79 MG/DL (ref 40–59)
HGB BLD-MCNC: 13.7 G/DL (ref 12–16)
IMM GRANULOCYTES # BLD AUTO: 0 X10(3) UL (ref 0–1)
IMM GRANULOCYTES NFR BLD: 0 %
IRON SATN MFR SERPL: 52 % (ref 15–50)
IRON SERPL-MCNC: 148 UG/DL (ref 50–170)
KETONES UR STRIP.AUTO-MCNC: NEGATIVE MG/DL
LDLC SERPL CALC-MCNC: 118 MG/DL (ref ?–100)
LEUKOCYTE ESTERASE UR QL STRIP.AUTO: NEGATIVE
LYMPHOCYTES # BLD AUTO: 1.64 X10(3) UL (ref 1–4)
LYMPHOCYTES NFR BLD AUTO: 60.1 %
MCH RBC QN AUTO: 30.7 PG (ref 26–34)
MCHC RBC AUTO-ENTMCNC: 34.4 G/DL (ref 31–37)
MCV RBC AUTO: 89.2 FL (ref 80–100)
MONOCYTES # BLD AUTO: 0.33 X10(3) UL (ref 0.1–1)
MONOCYTES NFR BLD AUTO: 12.1 %
NEUTROPHILS # BLD AUTO: 0.64 X10 (3) UL (ref 1.5–7.7)
NEUTROPHILS # BLD AUTO: 0.64 X10(3) UL (ref 1.5–7.7)
NEUTROPHILS NFR BLD AUTO: 23.4 %
NITRITE UR QL STRIP.AUTO: NEGATIVE
NONHDLC SERPL-MCNC: 157 MG/DL (ref ?–130)
OSMOLALITY SERPL CALC.SUM OF ELEC: 290 MOSM/KG (ref 275–295)
PH UR STRIP.AUTO: 7.5 (ref 5–8)
PLATELET # BLD AUTO: 310 10(3)UL (ref 150–450)
POTASSIUM SERPL-SCNC: 4.4 MMOL/L (ref 3.5–5.1)
PROT SERPL-MCNC: 7.2 G/DL (ref 5.7–8.2)
PROT UR STRIP.AUTO-MCNC: NEGATIVE MG/DL
RBC # BLD AUTO: 4.46 X10(6)UL (ref 3.8–5.3)
RBC UR QL AUTO: NEGATIVE
SODIUM SERPL-SCNC: 140 MMOL/L (ref 136–145)
SP GR UR STRIP.AUTO: 1.01 (ref 1–1.03)
TOTAL IRON BINDING CAPACITY: 286 UG/DL (ref 250–425)
TRANSFERRIN SERPL-MCNC: 228 MG/DL (ref 250–380)
TRIGL SERPL-MCNC: 225 MG/DL (ref 30–149)
TSI SER-ACNC: 0.9 UIU/ML (ref 0.55–4.78)
UROBILINOGEN UR STRIP.AUTO-MCNC: NORMAL MG/DL
VLDLC SERPL CALC-MCNC: 40 MG/DL (ref 0–30)
WBC # BLD AUTO: 2.7 X10(3) UL (ref 4–11)

## 2025-08-19 PROCEDURE — 36415 COLL VENOUS BLD VENIPUNCTURE: CPT

## 2025-08-19 PROCEDURE — 84443 ASSAY THYROID STIM HORMONE: CPT

## 2025-08-19 PROCEDURE — 80053 COMPREHEN METABOLIC PANEL: CPT

## 2025-08-19 PROCEDURE — 83540 ASSAY OF IRON: CPT

## 2025-08-19 PROCEDURE — 82728 ASSAY OF FERRITIN: CPT

## 2025-08-19 PROCEDURE — 85025 COMPLETE CBC W/AUTO DIFF WBC: CPT

## 2025-08-19 PROCEDURE — 83550 IRON BINDING TEST: CPT

## 2025-08-19 PROCEDURE — 80061 LIPID PANEL: CPT

## 2025-08-19 PROCEDURE — 81003 URINALYSIS AUTO W/O SCOPE: CPT

## 2025-08-25 ENCOUNTER — OFFICE VISIT (OUTPATIENT)
Facility: LOCATION | Age: 55
End: 2025-08-25
Attending: INTERNAL MEDICINE

## 2025-08-25 VITALS
TEMPERATURE: 98 F | DIASTOLIC BLOOD PRESSURE: 83 MMHG | RESPIRATION RATE: 16 BRPM | SYSTOLIC BLOOD PRESSURE: 126 MMHG | WEIGHT: 126.38 LBS | HEIGHT: 64.02 IN | OXYGEN SATURATION: 97 % | HEART RATE: 88 BPM | BODY MASS INDEX: 21.57 KG/M2

## 2025-08-25 DIAGNOSIS — D70.9 CHRONIC NEUTROPENIA: ICD-10-CM

## 2025-08-25 DIAGNOSIS — D50.0 IRON DEFICIENCY ANEMIA DUE TO CHRONIC BLOOD LOSS: Primary | ICD-10-CM

## (undated) DIAGNOSIS — Z98.82 HISTORY OF BREAST AUGMENTATION: ICD-10-CM

## (undated) DIAGNOSIS — Z12.31 ENCOUNTER FOR SCREENING MAMMOGRAM FOR BREAST CANCER: Primary | ICD-10-CM

## (undated) DIAGNOSIS — Z85.3 HISTORY OF RIGHT BREAST CANCER: ICD-10-CM

## (undated) NOTE — MR AVS SNAPSHOT
[facility]  [address] [facility]                    After Visit Summary   2/28/2017    Cassie Mishra    MRN: [id number]           Visit Information        Provider Department Dept Phone    2/28/2

## (undated) NOTE — Clinical Note
I had the pleasure of seeing Cassie Rodgersderick on 3/20/2019. Please see my attached note. Jere Gonzalez MD [facility]

## (undated) NOTE — MR AVS SNAPSHOT
[facility]  [address]  [phone]               Thank you for choosing us for your health care visit with Hermilo Moss MD.  We are glad to serve you and happy to provide you with this summary

## (undated) NOTE — MR AVS SNAPSHOT
[facility] [address], [address] [phone]               Thank you for choosing us for your health care visit with Kendra Quiroz MD.  We are glad to serve you and happy to provide you with this mack Where to Get Your Medications      You can get these medications from any pharmacy     Bring a paper prescription for each of these medications    - ALPRAZolam 0.5 MG Tabs            Maikelharkimberly     Call the [facility] for assistance with your inactive Vivek jimenez

## (undated) NOTE — Clinical Note
I had the pleasure of seeing Robert Mejia on 8/29/2022. Please see my attached note.     Antonia Aviles MD FACS  [facility]

## (undated) NOTE — LETTER
1/28/2019    [address]    Dear Ms. Campos Morales,     [id number] [facility] office has been trying to contact you to schedule a visit with your doctor to address important healthcare needs.   It is important that you contact our offic

## (undated) NOTE — MR AVS SNAPSHOT
After Visit Summary   6/19/2017    Tavia Ramirez    MRN: [id number]           Diagnoses this Visit     Ductal carcinoma in situ (DCIS) of right breast    -  Primary     Chronic leukopenia         Chronic neutropenia (HCC)         Normocytic anemia

## (undated) NOTE — Clinical Note
No referring provider defined for this encounter. 06/07/2017        Patient: Radha Adam   YOB: 1970   Date of Visit: 6/6/2017       Dear  Dr. Malissa Ibanez MD,      Thank you for referring Radha Adam to my practice.   Please [address] [phone]    Document electronically generated by:  Hermilo Moss

## (undated) NOTE — MR AVS SNAPSHOT
[facility] [place]  [address] [facility]                    After Visit Summary   2/14/2017    Tavia Ramirez    MRN: [id number]           Visit Information        Provider Department Dept Phone    2/14/2

## (undated) NOTE — MR AVS SNAPSHOT
[place]  [address]  [phone]               Thank you for choosing us for your health care visit with Merline Rudd, MD.  We are glad to serve you and happy to provide you with this summary medical emergencies, dial 911. Visit [email or web address] to learn more.            Visit [facility] online at  [email or web address]

## (undated) NOTE — Clinical Note
I had the pleasure of seeing Melissa Robin on 1/18/2017. Please see my attached note.   Shannan Caldwell MD FACS [facility]Surgery

## (undated) NOTE — MR AVS SNAPSHOT
[facility] General Surgery  [address] [address] [phone]               Thank you for choosing us for your health care visit with Cris Gatica MD.  We are glad to serve you and happy to provide you with this summary Wednesday January 18, 2017     Imaging:  [facility] 2D+3D DIAGNOSTIC [facility] hospital [id number]([id number])    Instructions: To schedule an appointment for your radiology test please call Angela Macdonald 84 Scheduling at [phone].          [facility]     Call the

## (undated) NOTE — ED AVS SNAPSHOT
Melissa Robin   MRN: [id number]    Department:  [facility] Emergency Department   Date of Visit:  1/16/2020           Disclosure     Insurance plans vary and the physician(s) referred by the ER may not be covered by your plan.  Please contact your tell this physician (or your personal doctor if your instructions are to return to your personal doctor) about any new or lasting problems. The primary care or specialist physician will see patients referred from the [facility] Emergency Department.  Salvadore Skiff

## (undated) NOTE — ED AVS SNAPSHOT
Jana Spann   MRN: [id number]    Department:  [facility] Emergency Department   Date of Visit:  9/2/2018           Disclosure     Insurance plans vary and the physician(s) referred by the ER may not be covered by your plan.  Please contact your tell this physician (or your personal doctor if your instructions are to return to your personal doctor) about any new or lasting problems. The primary care or specialist physician will see patients referred from the [facility] Emergency Department.  Efren Nicole

## (undated) NOTE — LETTER
Date: 6/16/2022    Patient Name: Paulina Aguirre          To Whom it may concern: This letter has been written at the patient's request. The above patient is established patient  at the [facility]. Patient has been treated for anxiety, sleeping difficulty, has also leukopenia  history of breast cancer, uterovaginal prolapse,  hiatal hernia,  history of back problems. Some of the medical conditions are managed by specialists. Her  support is very important for her wellbeing and and health. Thank you very much for your kind understanding.       Sincerely,      Vandana Briggs MD